# Patient Record
Sex: MALE | Race: WHITE | NOT HISPANIC OR LATINO | Employment: FULL TIME | URBAN - METROPOLITAN AREA
[De-identification: names, ages, dates, MRNs, and addresses within clinical notes are randomized per-mention and may not be internally consistent; named-entity substitution may affect disease eponyms.]

---

## 2017-09-06 ENCOUNTER — OFFICE VISIT (OUTPATIENT)
Dept: FAMILY MEDICINE | Facility: CLINIC | Age: 37
End: 2017-09-06
Payer: COMMERCIAL

## 2017-09-06 VITALS
BODY MASS INDEX: 25.37 KG/M2 | HEIGHT: 69 IN | WEIGHT: 171.31 LBS | SYSTOLIC BLOOD PRESSURE: 120 MMHG | HEART RATE: 84 BPM | DIASTOLIC BLOOD PRESSURE: 80 MMHG

## 2017-09-06 DIAGNOSIS — J06.9 UPPER RESPIRATORY TRACT INFECTION, UNSPECIFIED TYPE: Primary | ICD-10-CM

## 2017-09-06 PROCEDURE — 99999 PR PBB SHADOW E&M-EST. PATIENT-LVL II: CPT | Mod: PBBFAC,,, | Performed by: FAMILY MEDICINE

## 2017-09-06 PROCEDURE — 3008F BODY MASS INDEX DOCD: CPT | Mod: S$GLB,,, | Performed by: FAMILY MEDICINE

## 2017-09-06 PROCEDURE — 99213 OFFICE O/P EST LOW 20 MIN: CPT | Mod: S$GLB,,, | Performed by: FAMILY MEDICINE

## 2017-09-06 RX ORDER — INSULIN ASPART 100 [IU]/ML
INJECTION, SOLUTION INTRAVENOUS; SUBCUTANEOUS
Refills: 12 | COMMUNITY
Start: 2017-06-09 | End: 2018-04-10 | Stop reason: ALTCHOICE

## 2017-09-06 RX ORDER — INSULIN DETEMIR 100 [IU]/ML
INJECTION, SOLUTION SUBCUTANEOUS
Refills: 12 | COMMUNITY
Start: 2017-07-28 | End: 2018-04-10 | Stop reason: ALTCHOICE

## 2017-09-06 RX ORDER — AZELASTINE 1 MG/ML
1 SPRAY, METERED NASAL 2 TIMES DAILY
Qty: 30 ML | Refills: 1 | Status: SHIPPED | OUTPATIENT
Start: 2017-09-06 | End: 2018-12-05

## 2017-09-06 RX ORDER — INSULIN GLARGINE 100 [IU]/ML
35 INJECTION, SOLUTION SUBCUTANEOUS NIGHTLY
COMMUNITY
End: 2018-04-10 | Stop reason: SDUPTHER

## 2017-09-06 RX ORDER — LEVOCETIRIZINE DIHYDROCHLORIDE 5 MG/1
5 TABLET, FILM COATED ORAL NIGHTLY
Qty: 30 TABLET | Refills: 5 | Status: SHIPPED | OUTPATIENT
Start: 2017-09-06 | End: 2018-07-03 | Stop reason: SDUPTHER

## 2017-09-06 RX ORDER — INSULIN LISPRO 100 [IU]/ML
12 INJECTION, SOLUTION INTRAVENOUS; SUBCUTANEOUS
COMMUNITY
End: 2018-04-10 | Stop reason: SDUPTHER

## 2017-09-06 NOTE — PROGRESS NOTES
"Subjective:       Patient ID: Jay Meyer is a 37 y.o. male.    Chief Complaint: Sinus Problem and Discuss Insulin/ Medications    Sinus Problem   This is a new problem. The current episode started yesterday. The problem is unchanged. There has been no fever. Associated symptoms include congestion, coughing, sinus pressure and sneezing. Pertinent negatives include no ear pain or sore throat. Past treatments include nothing. The treatment provided no relief.     Review of Systems   HENT: Positive for congestion, sinus pressure and sneezing. Negative for ear pain and sore throat.    Respiratory: Positive for cough.        Objective:       Vitals:    09/06/17 1555   BP: 120/80   Pulse: 84   Weight: 77.7 kg (171 lb 4.8 oz)   Height: 5' 9" (1.753 m)       Physical Exam   Constitutional: He is oriented to person, place, and time. He appears well-developed and well-nourished. No distress.   HENT:   Head: Normocephalic and atraumatic.   Right Ear: External ear normal.   Left Ear: External ear normal.   Mouth/Throat: Oropharynx is clear and moist. No oropharyngeal exudate.   Neck: Normal range of motion. Neck supple.   Cardiovascular: Normal rate, regular rhythm and normal heart sounds.  Exam reveals no gallop and no friction rub.    No murmur heard.  Pulmonary/Chest: Effort normal and breath sounds normal. No respiratory distress. He has no wheezes. He has no rales. He exhibits no tenderness.   Lymphadenopathy:     He has cervical adenopathy.   Neurological: He is alert and oriented to person, place, and time.   Skin: He is not diaphoretic.       Assessment:       1. Upper respiratory tract infection, unspecified type        Plan:       Jay was seen today for sinus problem and discuss insulin/ medications.    Diagnoses and all orders for this visit:    Upper respiratory tract infection, unspecified type  -     azelastine (ASTELIN) 137 mcg (0.1 %) nasal spray; 1 spray (137 mcg total) by Nasal route 2 (two) times " daily.  -     levocetirizine (XYZAL) 5 MG tablet; Take 1 tablet (5 mg total) by mouth every evening.      - continue symptomatic treatment with rest, increase fluid intake, tylenol or ibuprofen PRN fever or body aches.

## 2017-09-07 RX ORDER — BENZONATATE 100 MG/1
100 CAPSULE ORAL 3 TIMES DAILY PRN
Qty: 30 CAPSULE | Refills: 0 | Status: SHIPPED | OUTPATIENT
Start: 2017-09-07 | End: 2017-09-17

## 2017-09-07 NOTE — TELEPHONE ENCOUNTER
----- Message from Sanjuanita Armstrong sent at 9/7/2017  7:49 AM CDT -----  Patient's wife is requesting a cough medication to be called in. States cough has taken a turn for the worse. Patient can be reached at [phone]. Please send to [facility] pharmacy in [place], Thank you!

## 2017-09-15 DIAGNOSIS — E11.9 TYPE 2 DIABETES MELLITUS WITHOUT COMPLICATION: ICD-10-CM

## 2018-04-10 ENCOUNTER — LAB VISIT (OUTPATIENT)
Dept: LAB | Facility: HOSPITAL | Age: 38
End: 2018-04-10
Attending: INTERNAL MEDICINE
Payer: COMMERCIAL

## 2018-04-10 ENCOUNTER — OFFICE VISIT (OUTPATIENT)
Dept: FAMILY MEDICINE | Facility: CLINIC | Age: 38
End: 2018-04-10
Payer: COMMERCIAL

## 2018-04-10 VITALS
BODY MASS INDEX: 26.06 KG/M2 | HEART RATE: 78 BPM | HEIGHT: 69 IN | RESPIRATION RATE: 18 BRPM | SYSTOLIC BLOOD PRESSURE: 118 MMHG | OXYGEN SATURATION: 98 % | DIASTOLIC BLOOD PRESSURE: 70 MMHG | TEMPERATURE: 98 F | WEIGHT: 175.94 LBS

## 2018-04-10 DIAGNOSIS — E11.9 TYPE 2 DIABETES MELLITUS WITHOUT COMPLICATION, WITHOUT LONG-TERM CURRENT USE OF INSULIN: ICD-10-CM

## 2018-04-10 LAB
CREAT UR-MCNC: 281 MG/DL
MICROALBUMIN UR DL<=1MG/L-MCNC: 92 UG/ML
MICROALBUMIN/CREATININE RATIO: 32.7 UG/MG

## 2018-04-10 PROCEDURE — 99214 OFFICE O/P EST MOD 30 MIN: CPT | Mod: S$GLB,,, | Performed by: INTERNAL MEDICINE

## 2018-04-10 PROCEDURE — 82043 UR ALBUMIN QUANTITATIVE: CPT

## 2018-04-10 PROCEDURE — 99999 PR PBB SHADOW E&M-EST. PATIENT-LVL III: CPT | Mod: PBBFAC,,, | Performed by: INTERNAL MEDICINE

## 2018-04-10 RX ORDER — INSULIN GLARGINE 100 [IU]/ML
40 INJECTION, SOLUTION SUBCUTANEOUS NIGHTLY
Qty: 15 ML | Refills: 5 | Status: SHIPPED | OUTPATIENT
Start: 2018-04-10 | End: 2018-11-01 | Stop reason: SDUPTHER

## 2018-04-10 RX ORDER — INSULIN LISPRO 100 [IU]/ML
INJECTION, SOLUTION INTRAVENOUS; SUBCUTANEOUS
Qty: 15 ML | Refills: 5 | Status: SHIPPED | OUTPATIENT
Start: 2018-04-10 | End: 2018-11-01 | Stop reason: SDUPTHER

## 2018-04-10 NOTE — PROGRESS NOTES
SUBJECTIVE     Chief Complaint   Patient presents with    Establish [facility]     Diabetic       HPI  Jay Meyer is a 37 y.o. male with multiple medical diagnoses as listed in the medical history and problem list that presents for establishment of care. Pt has hx of DM2. He was previously followed by Dr. Baeza, but would like to transition here for care. He reports full compliance with meds and denies any adverse effects. His fasting blood sugar ranges from 80s-120s. He is without any complaints today.    PAST MEDICAL HISTORY:  Past Medical History:   Diagnosis Date    Diabetes mellitus type I     since 2003; dx at 22       PAST SURGICAL HISTORY:  History reviewed. No pertinent surgical history.    SOCIAL HISTORY:  Social History     Social History    Marital status:      Spouse name: N/A    Number of children: N/A    Years of education: N/A     Occupational History               Social History Main Topics    Smoking status: Never Smoker    Smokeless tobacco: Not on file    Alcohol use 0.0 oz/week    Drug use: Unknown    Sexual activity: Yes     Partners: Female     Other Topics Concern    Not on file     Social History Narrative    No narrative on file       FAMILY HISTORY:  Family History   Problem Relation Age of Onset    Diabetes Father     Diabetes Brother     Vision loss Brother     Diabetes type II Paternal Grandfather     Diabetes type II Brother        ALLERGIES AND MEDICATIONS: updated and reviewed.  Review of patient's allergies indicates:  No Known Allergies  Current Outpatient Prescriptions   Medication Sig Dispense Refill    insulin lispro (HUMALOG KWIKPEN INSULIN) 100 unit/mL InPn pen Take 12 units before breakfast, 15 units before lunch, and 15 units before dinner 15 mL 5    levocetirizine (XYZAL) 5 MG tablet Take 1 tablet (5 mg total) by mouth every evening. 30 tablet 5    APIDRA 100 unit/mL injection       azelastine (ASTELIN) 137 mcg (0.1 %) nasal spray  "1 spray (137 mcg total) by Nasal route 2 (two) times daily. 30 mL 1    cetirizine (ZYRTEC) 10 MG tablet Take 1 tablet (10 mg total) by mouth once daily. 30 tablet 2    CONTOUR NEXT STRIPS Strp       insulin glargine (LANTUS SOLOSTAR U-100 INSULIN) 100 unit/mL (3 mL) InPn pen Inject 40 Units into the skin every evening. 15 mL 5     No current facility-administered medications for this visit.        ROS  Review of Systems   Constitutional: Negative for chills and fever.   HENT: Negative for hearing loss and sore throat.    Eyes: Negative for visual disturbance.   Respiratory: Negative for cough and shortness of breath.    Cardiovascular: Negative for chest pain, palpitations and leg swelling.   Gastrointestinal: Negative for abdominal pain, constipation, diarrhea, nausea and vomiting.   Genitourinary: Negative for dysuria, frequency and urgency.   Musculoskeletal: Positive for back pain (mid-back pain). Negative for arthralgias, joint swelling and myalgias.   Skin: Negative for rash and wound.   Neurological: Negative for headaches.   Psychiatric/Behavioral: Negative for agitation and confusion. The patient is not nervous/anxious.          OBJECTIVE     Physical Exam  Vitals:    04/10/18 0802   BP: 118/70   Pulse: 78   Resp: 18   Temp: 98.3 °F (36.8 °C)    Body mass index is 25.98 kg/m².  Weight: 79.8 kg (175 lb 14.8 oz)   Height: 5' 9" (175.3 cm)     Physical Exam   Constitutional: He is oriented to person, place, and time. He appears well-developed and well-nourished. No distress.   HENT:   Head: Normocephalic and atraumatic.   Right Ear: External ear normal.   Left Ear: External ear normal.   Nose: Nose normal.   Mouth/Throat: Oropharynx is clear and moist.   Eyes: Conjunctivae and EOM are normal. Right eye exhibits no discharge. Left eye exhibits no discharge. No scleral icterus.   Neck: Normal range of motion. Neck supple. No JVD present. No tracheal deviation present.   Cardiovascular: Normal rate, regular " rhythm, normal heart sounds and intact distal pulses.  Exam reveals no gallop and no friction rub.    No murmur heard.  Pulmonary/Chest: Effort normal and breath sounds normal. No respiratory distress. He has no wheezes.   Abdominal: Soft. Bowel sounds are normal. He exhibits no distension and no mass. There is no tenderness. There is no rebound and no guarding.   Musculoskeletal: Normal range of motion. He exhibits tenderness (R upper back mildly TTP over scapula). He exhibits no edema or deformity.   Neurological: He is alert and oriented to person, place, and time. He exhibits normal muscle tone. Coordination normal.   Skin: Skin is warm and dry. No rash noted. No erythema.   Psychiatric: He has a normal mood and affect. His behavior is normal. Judgment and thought content normal.         Health Maintenance       Date Due Completion Date    Lipid Panel 1981 ---    Foot Exam 07/04/1991 ---    TETANUS VACCINE 07/04/1999 ---    Pneumococcal PPSV23 (Medium Risk) (1) 07/04/1999 ---    Influenza Vaccine 08/01/2017 ---    Hemoglobin A1c 11/10/2017 5/10/2017    Override on 5/17/2016: Done (8.4  dr wise)    Urine Microalbumin 05/11/2018 5/11/2017    Eye Exam 10/16/2018 10/16/2017            ASSESSMENT     37 y.o. male with     1. Type 2 diabetes mellitus without complication, without long-term current use of insulin        PLAN:     1. Type 2 diabetes mellitus without complication, without long-term current use of insulin  - Stable; no acute issues  - Will review labs as ordered below and continue meds as previously ordered with adjustments as needed  - CBC auto differential; Future  - Comprehensive metabolic panel; Future  - TSH; Future  - Lipid panel; Future  - Hemoglobin A1c; Future  - MICROALBUMIN / CREATININE RATIO URINE; Future  - insulin glargine (LANTUS SOLOSTAR U-100 INSULIN) 100 unit/mL (3 mL) InPn pen; Inject 40 Units into the skin every evening.  Dispense: 15 mL; Refill: 5  - insulin lispro (HUMALOG  KWIKPEN INSULIN) 100 unit/mL InPn pen; Take 12 units before breakfast, 15 units before lunch, and 15 units before dinner  Dispense: 15 mL; Refill: 5        RTC in 3 months     Izabela Currie MD  04/10/2018 8:28 AM        No Follow-up on file.

## 2018-04-11 DIAGNOSIS — E78.2 MIXED HYPERLIPIDEMIA: Primary | ICD-10-CM

## 2018-04-11 DIAGNOSIS — R80.9 MICROALBUMINURIA DUE TO TYPE 2 DIABETES MELLITUS: Primary | ICD-10-CM

## 2018-04-11 DIAGNOSIS — E11.29 MICROALBUMINURIA DUE TO TYPE 2 DIABETES MELLITUS: Primary | ICD-10-CM

## 2018-04-11 RX ORDER — ATORVASTATIN CALCIUM 40 MG/1
40 TABLET, FILM COATED ORAL DAILY
Qty: 90 TABLET | Refills: 3 | Status: SHIPPED | OUTPATIENT
Start: 2018-04-11 | End: 2018-11-01 | Stop reason: SDUPTHER

## 2018-04-11 RX ORDER — LISINOPRIL 2.5 MG/1
2.5 TABLET ORAL DAILY
Qty: 90 TABLET | Refills: 3 | Status: SHIPPED | OUTPATIENT
Start: 2018-04-11 | End: 2018-11-01 | Stop reason: SDUPTHER

## 2018-04-12 DIAGNOSIS — E11.9 DIABETES MELLITUS WITHOUT COMPLICATION: ICD-10-CM

## 2018-05-11 ENCOUNTER — TELEPHONE (OUTPATIENT)
Dept: UROLOGY | Facility: CLINIC | Age: 38
End: 2018-05-11

## 2018-05-11 NOTE — TELEPHONE ENCOUNTER
----- Message from Arlette Nguyen sent at 5/11/2018  9:44 AM CDT -----  Contact: Self/ [phone]  Pt calling to inquire about becoming a new patient to get a vasectomy. Please call to advise. Thank you.

## 2018-05-16 ENCOUNTER — OFFICE VISIT (OUTPATIENT)
Dept: PODIATRY | Facility: CLINIC | Age: 38
End: 2018-05-16
Payer: COMMERCIAL

## 2018-05-16 VITALS — WEIGHT: 180 LBS | HEIGHT: 71 IN | BODY MASS INDEX: 25.2 KG/M2

## 2018-05-16 DIAGNOSIS — E10.9 DIABETES MELLITUS TYPE 1, CONTROLLED, WITHOUT COMPLICATIONS: ICD-10-CM

## 2018-05-16 DIAGNOSIS — B35.1 ONYCHOMYCOSIS DUE TO DERMATOPHYTE: Primary | ICD-10-CM

## 2018-05-16 PROCEDURE — 3008F BODY MASS INDEX DOCD: CPT | Mod: CPTII,S$GLB,, | Performed by: PODIATRIST

## 2018-05-16 PROCEDURE — 99203 OFFICE O/P NEW LOW 30 MIN: CPT | Mod: S$GLB,,, | Performed by: PODIATRIST

## 2018-05-16 PROCEDURE — 3045F PR MOST RECENT HEMOGLOBIN A1C LEVEL 7.0-9.0%: CPT | Mod: CPTII,S$GLB,, | Performed by: PODIATRIST

## 2018-05-16 PROCEDURE — 99999 PR PBB SHADOW E&M-EST. PATIENT-LVL III: CPT | Mod: PBBFAC,,, | Performed by: PODIATRIST

## 2018-05-16 RX ORDER — CICLOPIROX 80 MG/ML
SOLUTION TOPICAL NIGHTLY
Qty: 6.6 ML | Refills: 11 | Status: SHIPPED | OUTPATIENT
Start: 2018-05-16 | End: 2021-06-16

## 2018-05-16 NOTE — PROGRESS NOTES
Subjective:      Patient ID: Jay Meyer is a 37 y.o. male.    Chief Complaint: Diabetic Foot Exam (dr medellin/dr sandy 04/10/2018) and Diabetes Mellitus (infection in rt ft)    Jay is a 37 y.o. male who presents to the clinic for evaluation and treatment of high risk feet. Jay has a past medical history of Diabetes mellitus type I. The patient's chief complaint is long, thick toenails. Gradual onset, worsening over past several months-year, aggravated by increased weight bearing, shoe gear, pressure.  No previous medical treatment.  OTC pain med not helping.  Denies trauma, surgery all toes.  .    PCP: Hoda Medellin MD    Date Last Seen by PCP:   Chief Complaint   Patient presents with    Diabetic Foot Exam     dr medellin/dr sandy 04/10/2018    Diabetes Mellitus     infection in rt ft         Current shoe gear:  Affected Foot: Casual shoes     Unaffected Foot: Casual shoes    Hemoglobin A1C   Date Value Ref Range Status   04/10/2018 8.7 (H) 4.0 - 5.6 % Final     Comment:     According to ADA guidelines, hemoglobin A1c <7.0% represents  optimal control in non-pregnant diabetic patients. Different  metrics may apply to specific patient populations.   Standards of Medical Care in Diabetes-2016.  For the purpose of screening for the presence of diabetes:  <5.7%     Consistent with the absence of diabetes  5.7-6.4%  Consistent with increasing risk for diabetes   (prediabetes)  >or=6.5%  Consistent with diabetes  Currently, no consensus exists for use of hemoglobin A1c  for diagnosis of diabetes for children.  This Hemoglobin A1c assay has significant interference with fetal   hemoglobin   (HbF). The results are invalid for patients with abnormal amounts of   HbF,   including those with known Hereditary Persistence   of Fetal Hemoglobin. Heterozygous hemoglobin variants (HbAS, HbAC,   HbAD, HbAE, HbA2) do not significantly interfere with this assay;   however, presence of multiple variants in a sample  may impact the %   interference.         Review of Systems   Constitution: Negative for chills, diaphoresis, fever, malaise/fatigue and night sweats.   Cardiovascular: Negative for claudication, cyanosis, leg swelling and syncope.   Skin: Positive for nail changes. Negative for color change, dry skin, rash, suspicious lesions and unusual hair distribution.   Musculoskeletal: Negative for falls, joint pain, joint swelling, muscle cramps, muscle weakness and stiffness.   Gastrointestinal: Negative for constipation, diarrhea, nausea and vomiting.   Neurological: Negative for brief paralysis, disturbances in coordination, focal weakness, numbness, paresthesias, sensory change and tremors.           Objective:      Physical Exam   Constitutional: He is oriented to person, place, and time. He appears well-developed and well-nourished. He is cooperative. No distress.   Cardiovascular:   Pulses:       Popliteal pulses are 2+ on the right side, and 2+ on the left side.        Dorsalis pedis pulses are 2+ on the right side, and 2+ on the left side.        Posterior tibial pulses are 2+ on the right side, and 2+ on the left side.   Capillary refill 3 seconds all toes/distal feet, all toes/both feet warm to touch.      Negative lymphadenopathy bilateral popliteal fossa and tarsal tunnel.      Negavie lower extremity edema bilateral.     Musculoskeletal:        Right ankle: He exhibits normal range of motion, no swelling, no ecchymosis, no deformity, no laceration and normal pulse. Achilles tendon normal. Achilles tendon exhibits no pain, no defect and normal Downs's test results.   Normal angle, base, station of gait. All ten toes without clubbing, cyanosis, or signs of ischemia.  No pain to palpation bilateral lower extremities.  Range of motion, stability, muscle strength, and muscle tone normal bilateral feet and legs.     Lymphadenopathy: No inguinal adenopathy noted on the right or left side.   Negative lymphadenopathy  bilateral popliteal fossa and tarsal tunnel.    Negative lymphangitic streaking bilateral feet/ankles/legs.   Neurological: He is alert and oriented to person, place, and time. He has normal strength. He displays no atrophy and no tremor. No sensory deficit. He exhibits normal muscle tone. Gait normal.   Reflex Scores:       Patellar reflexes are 2+ on the right side and 2+ on the left side.       Achilles reflexes are 2+ on the right side and 2+ on the left side.  Negative tinel sign to percussion sural, superficial peroneal, deep peroneal, saphenous, and posterior tibial nerves right and left ankles and feet.     Skin: Skin is warm, dry and intact. Capillary refill takes 2 to 3 seconds. No abrasion, no bruising, no burn, no ecchymosis, no laceration, no lesion and no rash noted. He is not diaphoretic. No cyanosis or erythema. No pallor. Nails show no clubbing.     Skin is normal age and health appropriate color, turgor, texture, and temperature bilateral lower extremities without ulceration, hyperpigmentation, discoloration, masses nodules or cords palpated.  No ecchymosis, erythema, edema, or cardinal signs of infection bilateral lower extremities.    Toenails 1st, 2nd, 3rd, 4th, 5th  bilateral are hypertrophic thickened 2-3 mm, dystrophic, discolored tanish brown with tan, gray crumbly subungual debris.  Neatly trimmed and ont tender to distal nail plate pressure, without periungual skin abnormality of each.     Psychiatric: He has a normal mood and affect.             Assessment:       Encounter Diagnoses   Name Primary?    Onychomycosis due to dermatophyte Yes    Diabetes mellitus type 1, controlled, without complications          Plan:       Jay was seen today for diabetic foot exam and diabetes mellitus.    Diagnoses and all orders for this visit:    Onychomycosis due to dermatophyte    Diabetes mellitus type 1, controlled, without complications    Other orders  -     ciclopirox (PENLAC) 8 % Soln;  Apply topically nightly.      I counseled the patient on his conditions, their implications and medical management.        - Shoe inspection. Diabetic Foot Education. Patient reminded of the importance of good nutrition and blood sugar control to help prevent podiatric complications of diabetes. Patient instructed on proper foot hygeine. We discussed wearing proper shoe gear, daily foot inspections, never walking without protective shoe gear, never putting sharp instruments to feet, routine podiatric visits at least annually.      Discussed conservative treatment with shoes of adequate dimensions, material, and style to alleviate symptoms and delay or prevent surgical intervention.    Rx penlac.        Follow-up in about 1 year (around 5/16/2019).

## 2018-06-07 ENCOUNTER — OFFICE VISIT (OUTPATIENT)
Dept: UROLOGY | Facility: CLINIC | Age: 38
End: 2018-06-07
Payer: COMMERCIAL

## 2018-06-07 VITALS
HEART RATE: 68 BPM | BODY MASS INDEX: 25.18 KG/M2 | HEIGHT: 71 IN | RESPIRATION RATE: 14 BRPM | WEIGHT: 179.88 LBS | DIASTOLIC BLOOD PRESSURE: 62 MMHG | SYSTOLIC BLOOD PRESSURE: 130 MMHG

## 2018-06-07 DIAGNOSIS — Z30.09 VASECTOMY EVALUATION: Primary | ICD-10-CM

## 2018-06-07 PROCEDURE — 99204 OFFICE O/P NEW MOD 45 MIN: CPT | Mod: S$GLB,,, | Performed by: UROLOGY

## 2018-06-07 PROCEDURE — 99999 PR PBB SHADOW E&M-EST. PATIENT-LVL III: CPT | Mod: PBBFAC,,, | Performed by: UROLOGY

## 2018-06-07 PROCEDURE — 3008F BODY MASS INDEX DOCD: CPT | Mod: CPTII,S$GLB,, | Performed by: UROLOGY

## 2018-06-07 RX ORDER — DIAZEPAM 10 MG/1
10 TABLET ORAL
Qty: 1 TABLET | Refills: 0 | Status: SHIPPED | OUTPATIENT
Start: 2018-06-07 | End: 2018-07-19

## 2018-06-07 NOTE — PROGRESS NOTES
Subjective:       Jay Meyer is a 37 y.o. male who is a new patient who was self-referred for evaluation of vasectomy.      Vasectomy Consult  Patient here for pre-vasectomy consultation. He has one child now. His wife has health issues and therefore was recommended to not have any more children.     He denies hematuria, urinary tract infections, urolithiasis, prostatitis, erectile dysfunction, previous genitourinary surgery, epididymal orchitis, testicular masses, scrotal trauma, sexually transmitted diseases. He was given the consent form, pre-vasectomy instruction sheet, and vasectomy booklet. I extensively reviewed with him the likely postoperative recuperative period as well as the need to continue to use contraception until he is notified by us of his sterility. He will have a semen analysis after 12 weeks. He understands the potential side effects of anesthesia, bleeding, scrotal hematoma, wound infection, epididymal orchitis, epididymal congestion, chronic testicular pain requiring further surgery, sperm granuloma, antisperm antibodies, early recanalization, spontaneous recanalization with pregnancy after demonstration of azoospermia and the possible association with prostate cancer. He is aware of alternatives to vasectomy. He has given this careful consideration and wishes to proceed with a vasectomy.       The following portions of the patient's history were reviewed and updated as appropriate: allergies, current medications, past family history, past medical history, past social history, past surgical history and problem list.    Review of Systems  Constitutional: no fever or chills  ENT: no nasal congestion or sore throat  Respiratory: no cough or shortness of breath  Cardiovascular: no chest pain or palpitations  Gastrointestinal: no nausea or vomiting, tolerating diet  Genitourinary: as per HPI  Hematologic/Lymphatic: no easy bruising or lymphadenopathy  Musculoskeletal: no arthralgias or  "myalgias  Skin: no rashes or lesions  Neurological: no seizures or tremors  Behavioral/Psych: no auditory or visual hallucinations       Objective:    Vitals: /62   Pulse 68   Resp 14   Ht 5' 10.8" (1.798 m)   Wt 81.6 kg (179 lb 14.3 oz)   BMI 25.23 kg/m²     Physical Exam   General: well developed, well nourished in no acute distress  Head: normocephalic, atraumatic  Neck: supple, trachea midline, no obvious enlargement of thyroid  HEENT: EOMI, mucus membranes moist, sclera anicteric, no hearing impairment  Lungs: symmetric expansion, non-labored breathing  Cardiovascular: regular rate and rhythm, normal pulses  Abdomen: soft, non tender, non distended, no palpable masses, no hepatosplenomegaly, no hernias, bladder nonpalpable. No CVA tenderness.  Musculoskeletal: no peripheral edema, normal ROM in bilateral upper and lower extremities  Lymphatics: no cervical or inguinal lymphadenopathy  Skin: no rashes or lesions  Neuro: alert and oriented x 3, no gross deficits  Psych: normal judgment and insight, normal mood/affect and non-anxious  Genitourinary:   Penis -  circumcised penis without plaques, lesions, or scarring.  Urethra - orthotopic location without stenosis.  Scrotum  - no lesions or rashes, no hydrocele or hernia.  Testes - descended bilaterally, symmetric without masses, non tender.  Epididymides - no cysts or lesions, no spermatocele, symmetric   Vas palpable on both sides, somewhat difficult to feel on L  KIMBER: patient declined exam - not indicated      Lab Review   Urine analysis today in clinic shows +glucose     Lab Results   Component Value Date    WBC 5.84 04/10/2018    HGB 16.7 04/10/2018    HCT 47.0 04/10/2018    MCV 86 04/10/2018     04/10/2018     Lab Results   Component Value Date    CREATININE 0.9 04/10/2018    BUN 15 04/10/2018     No results found for: PSA  No results found for: PSADIAG    Imaging  NA       Assessment/Plan:      1. Vasectomy evaluation    - He understands " that vasectomy is a permanent solution for sterility. He also understands that he will need alternative forms of birth control after vasectomy until negative semen analysis completely at 3 months post-operatively. Risks, benefits, and alternative discussed thoroughly with patient today. He understands the risks of bleeding, infection, post-vasectomy pain syndrome, re-cannulization of vas, and need for further procedure. He is aware that vasectomy reversal is not guaranteed to be successful. He wishes to proceed with vasectomy.    - Valium 10mg rx   - In-office vasectomy         Follow up in 3-4 weeks for vasectomy

## 2018-06-21 DIAGNOSIS — Z30.09 VASECTOMY EVALUATION: Primary | ICD-10-CM

## 2018-07-03 DIAGNOSIS — J06.9 UPPER RESPIRATORY TRACT INFECTION, UNSPECIFIED TYPE: ICD-10-CM

## 2018-07-03 RX ORDER — LEVOCETIRIZINE DIHYDROCHLORIDE 5 MG/1
TABLET, FILM COATED ORAL
Qty: 30 TABLET | Refills: 5 | Status: SHIPPED | OUTPATIENT
Start: 2018-07-03 | End: 2021-06-16 | Stop reason: SDUPTHER

## 2018-07-19 ENCOUNTER — PROCEDURE VISIT (OUTPATIENT)
Dept: UROLOGY | Facility: CLINIC | Age: 38
End: 2018-07-19
Payer: COMMERCIAL

## 2018-07-19 DIAGNOSIS — Z30.09 VASECTOMY EVALUATION: ICD-10-CM

## 2018-07-19 PROCEDURE — 88302 TISSUE EXAM BY PATHOLOGIST: CPT | Mod: 26,,, | Performed by: PATHOLOGY

## 2018-07-19 PROCEDURE — 88302 TISSUE EXAM BY PATHOLOGIST: CPT | Mod: 59 | Performed by: PATHOLOGY

## 2018-07-19 PROCEDURE — 55250 REMOVAL OF SPERM DUCT(S): CPT | Mod: S$GLB,,, | Performed by: UROLOGY

## 2018-07-19 RX ORDER — HYDROCODONE BITARTRATE AND ACETAMINOPHEN 5; 325 MG/1; MG/1
1 TABLET ORAL EVERY 6 HOURS PRN
Qty: 8 TABLET | Refills: 0 | Status: SHIPPED | OUTPATIENT
Start: 2018-07-19 | End: 2018-12-05

## 2018-07-19 NOTE — PROCEDURES
"Vasectomy  Date/Time: 7/19/2018 3:44 PM  Performed by: TRICE DELATORRE  Authorized by: TRICE DELATORRE     Consent Done?:  Yes (Written)  Time out: Immediately prior to procedure a "time out" was called to verify the correct patient, procedure, equipment, support staff and site/side marked as required.    Preparation: Patient was prepped and draped in usual sterile fashion          POSTOPERATIVE DIAGNOSIS:  Seeks elective permanent sterilization.    POSTOPERATIVE DIAGNOSIS:  Seeks elective permanent sterilization.    PATHOLOGICAL DIAGNOSIS:  Pending.    ANESTHESIA:  Local.    CLINICAL HISTORY:  Patient seeks elective permanent sterilization by means of a bilateral vasectomy.  The patient's wife is aware that he is here and is supportive of this decision.  He had a complete vasectomy consult prior to this and understands the risks, benefits and alternatives.  Written informed consent was obtained prior to the procedure.    OFFICE PROCEDURE:  The patient was brought to the procedure room, and after identification by name, was placed supine on the procedure room table.  A complete time-out was performed.  The patient was prepped and draped in the usual sterile manner.  The vas were identified bilaterally and 8 mL of 1% lidocaine were injected on each side in order to provide local anesthestic block.  A 15-blade scalpel was then used to incise the skin. The skin was stretched with sharp forceps to allow access to the cord. Ring forceps were then used to bring the cord at the skin level.  It was  away from its underlying vascular tissue.  A 0.5 cm section of the vas was then cut from each side and sent to Pathology for further evaluation.  Each of the sides of the vas were then cauterized in their lumen.  The superior portion of the vas was tied over itself and was crimped in a manner so that it was positioned away from the opposite end of the vas.  Each side of the vas were then placed back in their " appropriate anatomic position away from each other within the scrotum.  The patient tolerated the procedure without difficulty.  There was no bruising or hematoma noted at the end of the case.  The patient was cleaned and dried and given post-vasectomy instructions.    COMPLICATIONS:  None.    ESTIMATED BLOOD LOSS:  Minimal.    FOLLOWUP:  The patient will follow up in 3 months with a semen sample.  He understands that he is considered fertile until semen sample shows that he is sterile. He knows he must continue alternative form of birth control until sterility has been confirmed.      Kizzy Sethi MD

## 2018-07-25 ENCOUNTER — TELEPHONE (OUTPATIENT)
Dept: UROLOGY | Facility: CLINIC | Age: 38
End: 2018-07-25

## 2018-07-25 NOTE — TELEPHONE ENCOUNTER
----- Message from Brayden Jay sent at 7/25/2018  3:29 PM CDT -----  Contact: spouse [phone]  Pt think his stitches are loose on left side. Pt diabetic and worried

## 2018-07-25 NOTE — TELEPHONE ENCOUNTER
Spoke with patient wife, informed her if the stiches are coming out that's fine. If patient is having redness, swelling, or incision drainage he will need to be seen. His wife will speak with him and call back if he needs an appointment.

## 2018-08-22 ENCOUNTER — TELEPHONE (OUTPATIENT)
Dept: UROLOGY | Facility: CLINIC | Age: 38
End: 2018-08-22

## 2018-08-22 NOTE — TELEPHONE ENCOUNTER
----- Message from Margy Olson sent at 8/22/2018 12:31 PM CDT -----  Contact: Self/[phone]  Patient would like to know if he could bring his specimen in early. Thank you.

## 2018-08-22 NOTE — TELEPHONE ENCOUNTER
Pt has post op on 10/25/18 from office vasectomy but is asking can pt bring in sample sooner. He states  told him this would be okay. Please advise-codey

## 2018-08-23 NOTE — TELEPHONE ENCOUNTER
I do not recall telling him he can come in sooner.     He can have an appt for semen analysis no sooner than 8 weeks from vasectomy. I usually do 12 weeks, but okay to do 8 weeks if he chooses. (end of September)

## 2018-09-24 ENCOUNTER — OFFICE VISIT (OUTPATIENT)
Dept: UROLOGY | Facility: CLINIC | Age: 38
End: 2018-09-24
Payer: COMMERCIAL

## 2018-09-24 VITALS — RESPIRATION RATE: 16 BRPM | HEIGHT: 69 IN | BODY MASS INDEX: 25.03 KG/M2 | WEIGHT: 169 LBS

## 2018-09-24 DIAGNOSIS — Z30.09 VASECTOMY EVALUATION: Primary | ICD-10-CM

## 2018-09-24 PROCEDURE — 3008F BODY MASS INDEX DOCD: CPT | Mod: CPTII,S$GLB,, | Performed by: UROLOGY

## 2018-09-24 PROCEDURE — 99024 POSTOP FOLLOW-UP VISIT: CPT | Mod: S$GLB,,, | Performed by: UROLOGY

## 2018-09-24 PROCEDURE — 99999 PR PBB SHADOW E&M-EST. PATIENT-LVL III: CPT | Mod: PBBFAC,,, | Performed by: UROLOGY

## 2018-09-24 NOTE — PROGRESS NOTES
Subjective:       Jay Meyer is a 38 y.o. male who is an established patient who was self-referred for evaluation of vasectomy.      Vasectomy Consult  Patient here for pre-vasectomy consultation. He has one child now. His wife has health issues and therefore was recommended to not have any more children.     He denies hematuria, urinary tract infections, urolithiasis, prostatitis, erectile dysfunction, previous genitourinary surgery, epididymal orchitis, testicular masses, scrotal trauma, sexually transmitted diseases. He was given the consent form, pre-vasectomy instruction sheet, and vasectomy booklet. I extensively reviewed with him the likely postoperative recuperative period as well as the need to continue to use contraception until he is notified by us of his sterility. He will have a semen analysis after 12 weeks. He understands the potential side effects of anesthesia, bleeding, scrotal hematoma, wound infection, epididymal orchitis, epididymal congestion, chronic testicular pain requiring further surgery, sperm granuloma, antisperm antibodies, early recanalization, spontaneous recanalization with pregnancy after demonstration of azoospermia and the possible association with prostate cancer. He is aware of alternatives to vasectomy. He has given this careful consideration and wishes to proceed with a vasectomy.     He is now s/p vasectomy on 7/19/18. Did well post-procedure.     SA - azospermic      The following portions of the patient's history were reviewed and updated as appropriate: allergies, current medications, past family history, past medical history, past social history, past surgical history and problem list.    Review of Systems  Constitutional: no fever or chills  ENT: no nasal congestion or sore throat  Respiratory: no cough or shortness of breath  Cardiovascular: no chest pain or palpitations  Gastrointestinal: no nausea or vomiting, tolerating diet  Genitourinary: as per  "HPI  Hematologic/Lymphatic: no easy bruising or lymphadenopathy  Musculoskeletal: no arthralgias or myalgias  Skin: no rashes or lesions  Neurological: no seizures or tremors  Behavioral/Psych: no auditory or visual hallucinations       Objective:    Vitals: Resp 16   Ht 5' 9" (1.753 m)   Wt 76.7 kg (169 lb)   BMI 24.96 kg/m²     Physical Exam   General: well developed, well nourished in no acute distress  Head: normocephalic, atraumatic  Neck: supple, trachea midline, no obvious enlargement of thyroid  HEENT: EOMI, mucus membranes moist, sclera anicteric, no hearing impairment  Lungs: symmetric expansion, non-labored breathing  Cardiovascular: regular rate and rhythm, normal pulses  Abdomen: soft, non tender, non distended, no palpable masses, no hepatosplenomegaly, no hernias, bladder nonpalpable. No CVA tenderness.  Musculoskeletal: no peripheral edema, normal ROM in bilateral upper and lower extremities  Lymphatics: no cervical or inguinal lymphadenopathy  Skin: no rashes or lesions  Neuro: alert and oriented x 3, no gross deficits  Psych: normal judgment and insight, normal mood/affect and non-anxious  Genitourinary: (last visit)  Penis -  circumcised penis without plaques, lesions, or scarring.  Urethra - orthotopic location without stenosis.  Scrotum  - no lesions or rashes, no hydrocele or hernia.  Testes - descended bilaterally, symmetric without masses, non tender.  Epididymides - no cysts or lesions, no spermatocele, symmetric   Vas palpable on both sides, somewhat difficult to feel on L  KIMBER: patient declined exam - not indicated      Lab Review   Urine analysis today in clinic shows - no urine      Lab Results   Component Value Date    WBC 5.84 04/10/2018    HGB 16.7 04/10/2018    HCT 47.0 04/10/2018    MCV 86 04/10/2018     04/10/2018     Lab Results   Component Value Date    CREATININE 0.9 04/10/2018    BUN 15 04/10/2018     No results found for: PSA  No results found for: " PSADIAG    Imaging  NA       Assessment/Plan:      1. Vasectomy evaluation    - He understands that vasectomy is a permanent solution for sterility. He also understands that he will need alternative forms of birth control after vasectomy until negative semen analysis completely at 3 months post-operatively. Risks, benefits, and alternative discussed thoroughly with patient today. He understands the risks of bleeding, infection, post-vasectomy pain syndrome, re-cannulization of vas, and need for further procedure. He is aware that vasectomy reversal is not guaranteed to be successful. He wishes to proceed with vasectomy.    - Vasectomy 7/18. L side somewhat difficult.   - Path - fully transected vas   - Post-vas SA - azoospermic         Follow up PRN

## 2018-10-07 DIAGNOSIS — E11.9 TYPE 2 DIABETES MELLITUS WITHOUT COMPLICATION, WITHOUT LONG-TERM CURRENT USE OF INSULIN: ICD-10-CM

## 2018-10-08 RX ORDER — INSULIN LISPRO 100 [IU]/ML
INJECTION, SOLUTION INTRAVENOUS; SUBCUTANEOUS
Qty: 15 SYRINGE | Refills: 5 | OUTPATIENT
Start: 2018-10-08

## 2018-10-16 LAB
LEFT EYE DM RETINOPATHY: NEGATIVE
RIGHT EYE DM RETINOPATHY: NEGATIVE

## 2018-10-27 ENCOUNTER — OFFICE VISIT (OUTPATIENT)
Dept: URGENT CARE | Facility: CLINIC | Age: 38
End: 2018-10-27
Payer: COMMERCIAL

## 2018-10-27 VITALS
OXYGEN SATURATION: 98 % | WEIGHT: 169 LBS | TEMPERATURE: 98 F | BODY MASS INDEX: 24.96 KG/M2 | DIASTOLIC BLOOD PRESSURE: 79 MMHG | SYSTOLIC BLOOD PRESSURE: 114 MMHG | HEART RATE: 75 BPM

## 2018-10-27 DIAGNOSIS — R09.82 POST-NASAL DRIP: ICD-10-CM

## 2018-10-27 DIAGNOSIS — J06.9 VIRAL URI WITH COUGH: Primary | ICD-10-CM

## 2018-10-27 PROCEDURE — 3008F BODY MASS INDEX DOCD: CPT | Mod: CPTII,S$GLB,, | Performed by: NURSE PRACTITIONER

## 2018-10-27 PROCEDURE — 99214 OFFICE O/P EST MOD 30 MIN: CPT | Mod: S$GLB,,, | Performed by: NURSE PRACTITIONER

## 2018-10-27 RX ORDER — PROMETHAZINE HYDROCHLORIDE AND DEXTROMETHORPHAN HYDROBROMIDE 6.25; 15 MG/5ML; MG/5ML
5 SYRUP ORAL NIGHTLY PRN
Qty: 118 ML | Refills: 0 | Status: SHIPPED | OUTPATIENT
Start: 2018-10-27 | End: 2018-11-05

## 2018-10-27 NOTE — PROGRESS NOTES
Subjective:       Patient ID: Jay Meyer is a 38 y.o. male.    Vitals:  weight is 76.7 kg (169 lb). His temperature is 97.8 °F (36.6 °C). His blood pressure is 114/79 and his pulse is 75. His oxygen saturation is 98%.     Chief Complaint: Cough    Pt states that the cough is worse at nighttime.       Cough   This is a new problem. The current episode started in the past 7 days. The problem has been unchanged. The cough is productive of sputum. Associated symptoms include nasal congestion and postnasal drip. Pertinent negatives include no chest pain, chills, ear pain, eye redness, fever, headaches, myalgias, sore throat or shortness of breath. He has tried OTC cough suppressant for the symptoms. The treatment provided mild relief.     Review of Systems   Constitution: Negative for chills, fever and malaise/fatigue.   HENT: Positive for postnasal drip. Negative for congestion, ear pain, hoarse voice and sore throat.    Eyes: Negative for discharge and redness.   Cardiovascular: Negative for chest pain, dyspnea on exertion and leg swelling.   Respiratory: Positive for cough and sputum production. Negative for shortness of breath.    Musculoskeletal: Negative for myalgias.   Gastrointestinal: Negative for abdominal pain and nausea.   Neurological: Negative for headaches.       Objective:      Physical Exam   Constitutional: He is oriented to person, place, and time. He appears well-developed and well-nourished. He is cooperative.  Non-toxic appearance. He does not appear ill. No distress.   HENT:   Head: Normocephalic and atraumatic.   Right Ear: Hearing, tympanic membrane, external ear and ear canal normal.   Left Ear: Hearing, tympanic membrane, external ear and ear canal normal.   Nose: Nose normal. No mucosal edema, rhinorrhea or nasal deformity. No epistaxis. Right sinus exhibits no maxillary sinus tenderness and no frontal sinus tenderness. Left sinus exhibits no maxillary sinus tenderness and no frontal  sinus tenderness.   Mouth/Throat: Uvula is midline, oropharynx is clear and moist and mucous membranes are normal. No trismus in the jaw. Normal dentition. No uvula swelling. No posterior oropharyngeal erythema.   Eyes: Conjunctivae and lids are normal. No scleral icterus.   Sclera clear bilat   Neck: Trachea normal, full passive range of motion without pain and phonation normal. Neck supple.   Cardiovascular: Normal rate, regular rhythm, normal heart sounds, intact distal pulses and normal pulses.   Pulmonary/Chest: Effort normal and breath sounds normal. No respiratory distress.   Abdominal: Soft. Normal appearance and bowel sounds are normal. He exhibits no distension. There is no tenderness.   Musculoskeletal: Normal range of motion. He exhibits no edema or deformity.   Neurological: He is alert and oriented to person, place, and time. He exhibits normal muscle tone. Coordination normal.   Skin: Skin is warm, dry and intact. He is not diaphoretic. No pallor.   Psychiatric: He has a normal mood and affect. His speech is normal and behavior is normal. Judgment and thought content normal. Cognition and memory are normal.   Nursing note and vitals reviewed.      Assessment:       1. Viral URI with cough    2. Post-nasal drip        Plan:         Viral URI with cough    Post-nasal drip    Other orders  -     loratadine-pseudoephedrine 5-120 mg (CLARITIN-D 12 HOUR) 5-120 mg per tablet; Take 1 tablet by mouth 2 (two) times daily. for 14 days  Dispense: 28 tablet; Refill: 0  -     promethazine-dextromethorphan (PROMETHAZINE-DM) 6.25-15 mg/5 mL Syrp; Take 5 mLs by mouth nightly as needed.  Dispense: 118 mL; Refill: 0            Patient Instructions   IF YOUR SYMPTOMS WORSEN OR DO NOT IMPROVE-FOLLOW UP. TAKE DELSYM OR ROBITUSSIN FOR COUGH WHICH IS OVER THE COUNTER.     You must understand that you've received an Urgent Care treatment only and that you may be released before all your medical problems are known or treated.  You, the patient, will arrange for follow up care as instructed.  If your condition worsens we recommend that you receive another evaluation at the emergency room immediately or contact your primary medical clinics after hours call service to discuss your concerns.  Please return here or go to the Emergency Department for any concerns or worsening of condition.      Viral Upper Respiratory Illness (Adult)  You have a viral upper respiratory illness (URI), which is another term for the common cold. This illness is contagious during the first few days. It is spread through the air by coughing and sneezing. It may also be spread by direct contact (touching the sick person and then touching your own eyes, nose, or mouth). Frequent handwashing will decrease risk of spread. Most viral illnesses go away within 7 to 10 days with rest and simple home remedies. Sometimes the illness may last for several weeks. Antibiotics will not kill a virus, and they are generally not prescribed for this condition.    Home care  · If symptoms are severe, rest at home for the first 2 to 3 days. When you resume activity, don't let yourself get too tired.  · Avoid being exposed to cigarette smoke (yours or others).  · You may use acetaminophen or ibuprofen to control pain and fever, unless another medicine was prescribed. (Note: If you have chronic liver or kidney disease, have ever had a stomach ulcer or gastrointestinal bleeding, or are taking blood-thinning medicines, talk with your healthcare provider before using these medicines.) Aspirin should never be given to anyone under 18 years of age who is ill with a viral infection or fever. It may cause severe liver or brain damage.  · Your appetite may be poor, so a light diet is fine. Avoid dehydration by drinking 6 to 8 glasses of fluids per day (water, soft drinks, juices, tea, or soup). Extra fluids will help loosen secretions in the nose and lungs.  · Over-the-counter cold medicines  will not shorten the length of time youre sick, but they may be helpful for the following symptoms: cough, sore throat, and nasal and sinus congestion. (Note: Do not use decongestants if you have high blood pressure.)  Follow-up care  Follow up with your healthcare provider, or as advised.  When to seek medical advice  Call your healthcare provider right away if any of these occur:  · Cough with lots of colored sputum (mucus)  · Severe headache; face, neck, or ear pain  · Difficulty swallowing due to throat pain  · Fever of 100.4°F (38°C)  Call 911, or get immediate medical care  Call emergency services right away if any of these occur:  · Chest pain, shortness of breath, wheezing, or difficulty breathing  · Coughing up blood  · Inability to swallow due to throat pain  Date Last Reviewed: 9/13/2015  © 8208-4249 [employer] [address] [place] [address], [place] [address]. All rights reserved. This information is not intended as a substitute for professional medical care. Always follow your healthcare professional's instructions.

## 2018-10-27 NOTE — PATIENT INSTRUCTIONS
IF YOUR SYMPTOMS WORSEN OR DO NOT IMPROVE-FOLLOW UP. TAKE DELSYM OR ROBITUSSIN FOR COUGH WHICH IS OVER THE COUNTER.     You must understand that you've received an Urgent Care treatment only and that you may be released before all your medical problems are known or treated. You, the patient, will arrange for follow up care as instructed.  If your condition worsens we recommend that you receive another evaluation at the emergency room immediately or contact your primary medical clinics after hours call service to discuss your concerns.  Please return here or go to the Emergency Department for any concerns or worsening of condition.      Viral Upper Respiratory Illness (Adult)  You have a viral upper respiratory illness (URI), which is another term for the common cold. This illness is contagious during the first few days. It is spread through the air by coughing and sneezing. It may also be spread by direct contact (touching the sick person and then touching your own eyes, nose, or mouth). Frequent handwashing will decrease risk of spread. Most viral illnesses go away within 7 to 10 days with rest and simple home remedies. Sometimes the illness may last for several weeks. Antibiotics will not kill a virus, and they are generally not prescribed for this condition.    Home care  · If symptoms are severe, rest at home for the first 2 to 3 days. When you resume activity, don't let yourself get too tired.  · Avoid being exposed to cigarette smoke (yours or others).  · You may use acetaminophen or ibuprofen to control pain and fever, unless another medicine was prescribed. (Note: If you have chronic liver or kidney disease, have ever had a stomach ulcer or gastrointestinal bleeding, or are taking blood-thinning medicines, talk with your healthcare provider before using these medicines.) Aspirin should never be given to anyone under 18 years of age who is ill with a viral infection or fever. It may cause severe liver or  brain damage.  · Your appetite may be poor, so a light diet is fine. Avoid dehydration by drinking 6 to 8 glasses of fluids per day (water, soft drinks, juices, tea, or soup). Extra fluids will help loosen secretions in the nose and lungs.  · Over-the-counter cold medicines will not shorten the length of time youre sick, but they may be helpful for the following symptoms: cough, sore throat, and nasal and sinus congestion. (Note: Do not use decongestants if you have high blood pressure.)  Follow-up care  Follow up with your healthcare provider, or as advised.  When to seek medical advice  Call your healthcare provider right away if any of these occur:  · Cough with lots of colored sputum (mucus)  · Severe headache; face, neck, or ear pain  · Difficulty swallowing due to throat pain  · Fever of 100.4°F (38°C)  Call 911, or get immediate medical care  Call emergency services right away if any of these occur:  · Chest pain, shortness of breath, wheezing, or difficulty breathing  · Coughing up blood  · Inability to swallow due to throat pain  Date Last Reviewed: 9/13/2015  © 5187-4280 [employer]. [address] [place] [address], [place] [address]. All rights reserved. This information is not intended as a substitute for professional medical care. Always follow your healthcare professional's instructions.

## 2018-11-01 ENCOUNTER — OFFICE VISIT (OUTPATIENT)
Dept: FAMILY MEDICINE | Facility: CLINIC | Age: 38
End: 2018-11-01
Payer: COMMERCIAL

## 2018-11-01 VITALS
HEART RATE: 102 BPM | SYSTOLIC BLOOD PRESSURE: 120 MMHG | OXYGEN SATURATION: 97 % | TEMPERATURE: 99 F | WEIGHT: 167.56 LBS | HEIGHT: 69 IN | BODY MASS INDEX: 24.82 KG/M2 | DIASTOLIC BLOOD PRESSURE: 74 MMHG

## 2018-11-01 DIAGNOSIS — E11.29 MICROALBUMINURIA DUE TO TYPE 2 DIABETES MELLITUS: ICD-10-CM

## 2018-11-01 DIAGNOSIS — L02.411 CUTANEOUS ABSCESS OF RIGHT AXILLA: Primary | ICD-10-CM

## 2018-11-01 DIAGNOSIS — R80.9 MICROALBUMINURIA DUE TO TYPE 2 DIABETES MELLITUS: ICD-10-CM

## 2018-11-01 DIAGNOSIS — E11.9 TYPE 2 DIABETES MELLITUS WITHOUT COMPLICATION, WITHOUT LONG-TERM CURRENT USE OF INSULIN: ICD-10-CM

## 2018-11-01 DIAGNOSIS — E78.2 MIXED HYPERLIPIDEMIA: ICD-10-CM

## 2018-11-01 PROCEDURE — 99999 PR PBB SHADOW E&M-EST. PATIENT-LVL III: CPT | Mod: PBBFAC,,, | Performed by: INTERNAL MEDICINE

## 2018-11-01 PROCEDURE — 99214 OFFICE O/P EST MOD 30 MIN: CPT | Mod: S$GLB,,, | Performed by: INTERNAL MEDICINE

## 2018-11-01 PROCEDURE — 3008F BODY MASS INDEX DOCD: CPT | Mod: CPTII,S$GLB,, | Performed by: INTERNAL MEDICINE

## 2018-11-01 PROCEDURE — 3045F PR MOST RECENT HEMOGLOBIN A1C LEVEL 7.0-9.0%: CPT | Mod: CPTII,S$GLB,, | Performed by: INTERNAL MEDICINE

## 2018-11-01 RX ORDER — INSULIN GLARGINE 100 [IU]/ML
40 INJECTION, SOLUTION SUBCUTANEOUS NIGHTLY
Qty: 15 ML | Refills: 5 | Status: SHIPPED | OUTPATIENT
Start: 2018-11-01 | End: 2019-03-06

## 2018-11-01 RX ORDER — ATORVASTATIN CALCIUM 40 MG/1
40 TABLET, FILM COATED ORAL DAILY
Qty: 90 TABLET | Refills: 1 | Status: SHIPPED | OUTPATIENT
Start: 2018-11-01 | End: 2019-05-11 | Stop reason: SDUPTHER

## 2018-11-01 RX ORDER — DOXYCYCLINE HYCLATE 100 MG
100 TABLET ORAL 2 TIMES DAILY
Qty: 20 TABLET | Refills: 0 | Status: SHIPPED | OUTPATIENT
Start: 2018-11-01 | End: 2018-11-11

## 2018-11-01 RX ORDER — INSULIN LISPRO 100 [IU]/ML
INJECTION, SOLUTION INTRAVENOUS; SUBCUTANEOUS
Qty: 15 ML | Refills: 5 | Status: SHIPPED | OUTPATIENT
Start: 2018-11-01 | End: 2019-07-12 | Stop reason: SDUPTHER

## 2018-11-01 RX ORDER — LISINOPRIL 2.5 MG/1
2.5 TABLET ORAL DAILY
Qty: 90 TABLET | Refills: 1 | Status: SHIPPED | OUTPATIENT
Start: 2018-11-01 | End: 2019-07-19 | Stop reason: SDUPTHER

## 2018-11-01 NOTE — PROGRESS NOTES
SUBJECTIVE     Chief Complaint   Patient presents with    Recurrent Skin Infections     R armpit x 2 days. Tender to touch. No drainage       HPI  Jay Meyer is a 38 y.o. male with multiple medical diagnoses as listed in the medical history and problem list that presents for evaluation of abscess to the R axilla x 2 days. Pt reports an erythematous, painful lesion to the R axillary region. Denies any drainage, increased warmth to touch, fever, chills, or night sweats. Pt has not been taking/applying meds for his symptoms. He presented today because it is getting bigger and his ROM is decreased 2/2 pain.     PAST MEDICAL HISTORY:  Past Medical History:   Diagnosis Date    Diabetes mellitus type I     since 2003; dx at 22       PAST SURGICAL HISTORY:  History reviewed. No pertinent surgical history.    SOCIAL HISTORY:  Social History     Socioeconomic History    Marital status:      Spouse name: Not on file    Number of children: Not on file    Years of education: Not on file    Highest education level: Not on file   Social Needs    Financial resource strain: Not on file    Food insecurity - worry: Not on file    Food insecurity - inability: Not on file    Transportation needs - medical: Not on file    Transportation needs - non-medical: Not on file   Occupational History     Comment:    Tobacco Use    Smoking status: Never Smoker    Smokeless tobacco: Current User     Types: Snuff   Substance and Sexual Activity    Alcohol use: Yes     Alcohol/week: 0.0 oz    Drug use: Not on file    Sexual activity: Yes     Partners: Female   Other Topics Concern    Not on file   Social History Narrative    Not on file       FAMILY HISTORY:  Family History   Problem Relation Age of Onset    Diabetes Father     Diabetes Brother     Vision loss Brother     Diabetes type II Paternal Grandfather     Diabetes type II Brother        ALLERGIES AND MEDICATIONS: updated and  reviewed.  Review of patient's allergies indicates:  No Known Allergies  Current Outpatient Medications   Medication Sig Dispense Refill    APIDRA 100 unit/mL injection       atorvastatin (LIPITOR) 40 MG tablet Take 1 tablet (40 mg total) by mouth once daily. 90 tablet 1    azelastine (ASTELIN) 137 mcg (0.1 %) nasal spray 1 spray (137 mcg total) by Nasal route 2 (two) times daily. 30 mL 1    cetirizine (ZYRTEC) 10 MG tablet Take 1 tablet (10 mg total) by mouth once daily. 30 tablet 2    ciclopirox (PENLAC) 8 % Soln Apply topically nightly. 6.6 mL 11    CONTOUR NEXT STRIPS Strp       doxycycline (VIBRA-TABS) 100 MG tablet Take 1 tablet (100 mg total) by mouth 2 (two) times daily. for 10 days 20 tablet 0    HYDROcodone-acetaminophen (NORCO) 5-325 mg per tablet Take 1 tablet by mouth every 6 (six) hours as needed for Pain. 8 tablet 0    insulin glargine (LANTUS SOLOSTAR U-100 INSULIN) 100 unit/mL (3 mL) InPn pen Inject 40 Units into the skin every evening. 15 mL 5    insulin lispro (HUMALOG KWIKPEN INSULIN) 100 unit/mL InPn pen Take 12 units before breakfast, 15 units before lunch, and 15 units before dinner 15 mL 5    levocetirizine (XYZAL) 5 MG tablet TAKE 1 TABLET BY MOUTH IN THE EVENING 30 tablet 5    lisinopril (PRINIVIL,ZESTRIL) 2.5 MG tablet Take 1 tablet (2.5 mg total) by mouth once daily. 90 tablet 1    loratadine-pseudoephedrine 5-120 mg (CLARITIN-D 12 HOUR) 5-120 mg per tablet Take 1 tablet by mouth 2 (two) times daily. for 14 days 28 tablet 0    promethazine-dextromethorphan (PROMETHAZINE-DM) 6.25-15 mg/5 mL Syrp Take 5 mLs by mouth nightly as needed. 118 mL 0     No current facility-administered medications for this visit.        ROS  Review of Systems   Constitutional: Negative for chills and fever.   HENT: Negative for hearing loss and sore throat.    Eyes: Negative for visual disturbance.   Respiratory: Negative for cough and shortness of breath.    Cardiovascular: Negative for chest pain,  "palpitations and leg swelling.   Gastrointestinal: Negative for abdominal pain, constipation, diarrhea, nausea and vomiting.   Genitourinary: Negative for dysuria, frequency and urgency.   Musculoskeletal: Negative for arthralgias, joint swelling and myalgias.   Skin: Positive for wound (R axillary abscess). Negative for rash.   Neurological: Negative for headaches.   Psychiatric/Behavioral: Negative for agitation and confusion. The patient is not nervous/anxious.          OBJECTIVE     Physical Exam  Vitals:    11/01/18 1337   BP: 120/74   Pulse: 102   Temp: 98.6 °F (37 °C)    Body mass index is 24.74 kg/m².  Weight: 76 kg (167 lb 8.8 oz)   Height: 5' 9" (175.3 cm)     Physical Exam   Constitutional: He is oriented to person, place, and time. He appears well-developed and well-nourished. No distress.   HENT:   Head: Normocephalic and atraumatic.   Right Ear: External ear normal.   Left Ear: External ear normal.   Nose: Nose normal.   Mouth/Throat: Oropharynx is clear and moist.   Eyes: Conjunctivae and EOM are normal. Right eye exhibits no discharge. Left eye exhibits no discharge. No scleral icterus.   Neck: Normal range of motion. Neck supple. No JVD present. No tracheal deviation present.   Pulmonary/Chest: Effort normal. No respiratory distress.   Musculoskeletal: Normal range of motion. He exhibits no edema, tenderness or deformity.   Neurological: He is alert and oriented to person, place, and time. He exhibits normal muscle tone. Coordination normal.   Skin: Skin is warm and dry. No rash noted. There is erythema.   Pea-sized area of erythema and induration to the supero-medial R axilla, mildly TTP; no fluctuance or drainage appreciated   Psychiatric: He has a normal mood and affect. His behavior is normal. Judgment and thought content normal.         Health Maintenance       Date Due Completion Date    Foot Exam 07/04/1991 ---    TETANUS VACCINE 07/04/1999 ---    Pneumococcal PPSV23 (Medium Risk) (1) " 07/04/1999 ---    Influenza Vaccine 08/01/2018 ---    Hemoglobin A1c 10/10/2018 4/10/2018    Override on 5/17/2016: Done (8.4  dr wise)    Eye Exam 10/16/2018 10/16/2017    Lipid Panel 04/10/2019 4/10/2018            ASSESSMENT     38 y.o. male with     1. Cutaneous abscess of right axilla    2. Type 2 diabetes mellitus without complication, without long-term current use of insulin    3. Microalbuminuria due to type 2 diabetes mellitus    4. Mixed hyperlipidemia        PLAN:     1. Cutaneous abscess of right axilla  - Pt advised to take Abx to completion  - doxycycline (VIBRA-TABS) 100 MG tablet; Take 1 tablet (100 mg total) by mouth 2 (two) times daily. for 10 days  Dispense: 20 tablet; Refill: 0  - Seek medical attention for any worsening erythema with red streaking, edema, drainage, pain/tenderness, fever, chills, or night sweats    2. Type 2 diabetes mellitus without complication, without long-term current use of insulin  - Stable; no acute issues  - The current medical regimen is effective;  continue present plan and medications.  - insulin glargine (LANTUS SOLOSTAR U-100 INSULIN) 100 unit/mL (3 mL) InPn pen; Inject 40 Units into the skin every evening.  Dispense: 15 mL; Refill: 5  - insulin lispro (HUMALOG KWIKPEN INSULIN) 100 unit/mL InPn pen; Take 12 units before breakfast, 15 units before lunch, and 15 units before dinner  Dispense: 15 mL; Refill: 5  - CBC auto differential; Future  - Hemoglobin A1c; Future  - Comprehensive metabolic panel; Future    3. Microalbuminuria due to type 2 diabetes mellitus  - Stable; no acute issues  - The current medical regimen is effective;  continue present plan and medications.  - lisinopril (PRINIVIL,ZESTRIL) 2.5 MG tablet; Take 1 tablet (2.5 mg total) by mouth once daily.  Dispense: 90 tablet; Refill: 1    4. Mixed hyperlipidemia  - Stable; no acute issues  - The current medical regimen is effective;  continue present plan and medications.  - atorvastatin (LIPITOR) 40 MG  tablet; Take 1 tablet (40 mg total) by mouth once daily.  Dispense: 90 tablet; Refill: 1        RTC in 1-2 weeks as needed for any acute worsening of current condition or failure to improve       Izabela Currie MD  11/01/2018 1:43 PM        No Follow-up on file.

## 2018-11-02 ENCOUNTER — LAB VISIT (OUTPATIENT)
Dept: LAB | Facility: HOSPITAL | Age: 38
End: 2018-11-02
Attending: FAMILY MEDICINE
Payer: COMMERCIAL

## 2018-11-02 DIAGNOSIS — E11.9 TYPE 2 DIABETES MELLITUS WITHOUT COMPLICATION, WITHOUT LONG-TERM CURRENT USE OF INSULIN: ICD-10-CM

## 2018-11-02 LAB
ALBUMIN SERPL BCP-MCNC: 4.3 G/DL
ALP SERPL-CCNC: 181 U/L
ALT SERPL W/O P-5'-P-CCNC: 44 U/L
ANION GAP SERPL CALC-SCNC: 10 MMOL/L
AST SERPL-CCNC: 28 U/L
BASOPHILS # BLD AUTO: 0.03 K/UL
BASOPHILS NFR BLD: 0.5 %
BILIRUB SERPL-MCNC: 1.2 MG/DL
BUN SERPL-MCNC: 12 MG/DL
CALCIUM SERPL-MCNC: 9.7 MG/DL
CHLORIDE SERPL-SCNC: 101 MMOL/L
CO2 SERPL-SCNC: 27 MMOL/L
CREAT SERPL-MCNC: 0.9 MG/DL
DIFFERENTIAL METHOD: ABNORMAL
EOSINOPHIL # BLD AUTO: 0.1 K/UL
EOSINOPHIL NFR BLD: 2 %
ERYTHROCYTE [DISTWIDTH] IN BLOOD BY AUTOMATED COUNT: 12.7 %
EST. GFR  (AFRICAN AMERICAN): >60 ML/MIN/1.73 M^2
EST. GFR  (NON AFRICAN AMERICAN): >60 ML/MIN/1.73 M^2
GLUCOSE SERPL-MCNC: 227 MG/DL
HCT VFR BLD AUTO: 45.4 %
HGB BLD-MCNC: 16.1 G/DL
LYMPHOCYTES # BLD AUTO: 1.1 K/UL
LYMPHOCYTES NFR BLD: 17.9 %
MCH RBC QN AUTO: 31.1 PG
MCHC RBC AUTO-ENTMCNC: 35.5 G/DL
MCV RBC AUTO: 88 FL
MONOCYTES # BLD AUTO: 0.7 K/UL
MONOCYTES NFR BLD: 11.4 %
NEUTROPHILS # BLD AUTO: 4 K/UL
NEUTROPHILS NFR BLD: 68.4 %
PLATELET # BLD AUTO: 187 K/UL
PMV BLD AUTO: 12.8 FL
POTASSIUM SERPL-SCNC: 4.4 MMOL/L
PROT SERPL-MCNC: 7.6 G/DL
RBC # BLD AUTO: 5.18 M/UL
SODIUM SERPL-SCNC: 138 MMOL/L
WBC # BLD AUTO: 5.86 K/UL

## 2018-11-02 PROCEDURE — 80053 COMPREHEN METABOLIC PANEL: CPT

## 2018-11-02 PROCEDURE — 85025 COMPLETE CBC W/AUTO DIFF WBC: CPT

## 2018-11-02 PROCEDURE — 36415 COLL VENOUS BLD VENIPUNCTURE: CPT | Mod: PO

## 2018-11-02 PROCEDURE — 83036 HEMOGLOBIN GLYCOSYLATED A1C: CPT

## 2018-11-03 LAB
ESTIMATED AVG GLUCOSE: 246 MG/DL
HBA1C MFR BLD HPLC: 10.2 %

## 2018-11-05 ENCOUNTER — OFFICE VISIT (OUTPATIENT)
Dept: FAMILY MEDICINE | Facility: CLINIC | Age: 38
End: 2018-11-05
Payer: COMMERCIAL

## 2018-11-05 VITALS
BODY MASS INDEX: 24.97 KG/M2 | HEART RATE: 97 BPM | DIASTOLIC BLOOD PRESSURE: 84 MMHG | SYSTOLIC BLOOD PRESSURE: 112 MMHG | TEMPERATURE: 99 F | WEIGHT: 169.06 LBS | OXYGEN SATURATION: 97 %

## 2018-11-05 DIAGNOSIS — E11.9 TYPE 2 DIABETES MELLITUS WITHOUT COMPLICATION, WITHOUT LONG-TERM CURRENT USE OF INSULIN: ICD-10-CM

## 2018-11-05 DIAGNOSIS — J06.9 UPPER RESPIRATORY INFECTION WITH COUGH AND CONGESTION: Primary | ICD-10-CM

## 2018-11-05 DIAGNOSIS — Z71.2 ENCOUNTER TO DISCUSS TEST RESULTS: ICD-10-CM

## 2018-11-05 PROCEDURE — 3046F HEMOGLOBIN A1C LEVEL >9.0%: CPT | Mod: CPTII,S$GLB,, | Performed by: INTERNAL MEDICINE

## 2018-11-05 PROCEDURE — 99999 PR PBB SHADOW E&M-EST. PATIENT-LVL III: CPT | Mod: PBBFAC,,, | Performed by: INTERNAL MEDICINE

## 2018-11-05 PROCEDURE — 99214 OFFICE O/P EST MOD 30 MIN: CPT | Mod: S$GLB,,, | Performed by: INTERNAL MEDICINE

## 2018-11-05 PROCEDURE — 3008F BODY MASS INDEX DOCD: CPT | Mod: CPTII,S$GLB,, | Performed by: INTERNAL MEDICINE

## 2018-11-05 RX ORDER — FLUTICASONE PROPIONATE 50 MCG
1 SPRAY, SUSPENSION (ML) NASAL DAILY
Qty: 16 G | Refills: 0 | Status: SHIPPED | OUTPATIENT
Start: 2018-11-05 | End: 2021-06-16

## 2018-11-05 RX ORDER — CODEINE PHOSPHATE AND GUAIFENESIN 10; 100 MG/5ML; MG/5ML
5 SOLUTION ORAL NIGHTLY PRN
Qty: 120 ML | Refills: 0 | Status: SHIPPED | OUTPATIENT
Start: 2018-11-05 | End: 2018-11-15

## 2018-11-05 NOTE — PROGRESS NOTES
SUBJECTIVE     Chief Complaint   Patient presents with    Cough     sx started about 1 week ago.,     Nasal Congestion    Otalgia    Headache    Sore Throat       HPI  Jay Meyer is a 38 y.o. male with multiple medical diagnoses as listed in the medical history and problem list that presents for evaluation of URI x 1 week. Pt reports a dry cough, nasal congestion, B/L ear fullness, headache, and sore throat. Denies any fever, but has had chills and night sweats. Pt has been taking Ricola and Latrice Denton with minimal improvement of symptoms. Of note, pt is currently on Doxycyline for an abscess. Denies any sick contacts/recent travel. Pt went to an urgent care and received a Rx for Claritin and Promethazine-DM without improvement.    PAST MEDICAL HISTORY:  Past Medical History:   Diagnosis Date    Diabetes mellitus type I     since 2003; dx at 22       PAST SURGICAL HISTORY:  History reviewed. No pertinent surgical history.    SOCIAL HISTORY:  Social History     Socioeconomic History    Marital status:      Spouse name: Not on file    Number of children: Not on file    Years of education: Not on file    Highest education level: Not on file   Social Needs    Financial resource strain: Not on file    Food insecurity - worry: Not on file    Food insecurity - inability: Not on file    Transportation needs - medical: Not on file    Transportation needs - non-medical: Not on file   Occupational History     Comment:    Tobacco Use    Smoking status: Never Smoker    Smokeless tobacco: Current User     Types: Snuff   Substance and Sexual Activity    Alcohol use: Yes     Alcohol/week: 0.0 oz    Drug use: Not on file    Sexual activity: Yes     Partners: Female   Other Topics Concern    Not on file   Social History Narrative    Not on file       FAMILY HISTORY:  Family History   Problem Relation Age of Onset    Diabetes Father     Diabetes Brother     Vision loss Brother      Diabetes type II Paternal Grandfather     Diabetes type II Brother        ALLERGIES AND MEDICATIONS: updated and reviewed.  Review of patient's allergies indicates:  No Known Allergies  Current Outpatient Medications   Medication Sig Dispense Refill    APIDRA 100 unit/mL injection       atorvastatin (LIPITOR) 40 MG tablet Take 1 tablet (40 mg total) by mouth once daily. 90 tablet 1    azelastine (ASTELIN) 137 mcg (0.1 %) nasal spray 1 spray (137 mcg total) by Nasal route 2 (two) times daily. 30 mL 1    cetirizine (ZYRTEC) 10 MG tablet Take 1 tablet (10 mg total) by mouth once daily. 30 tablet 2    ciclopirox (PENLAC) 8 % Soln Apply topically nightly. 6.6 mL 11    CONTOUR NEXT STRIPS Strp       doxycycline (VIBRA-TABS) 100 MG tablet Take 1 tablet (100 mg total) by mouth 2 (two) times daily. for 10 days 20 tablet 0    fluticasone (FLONASE) 50 mcg/actuation nasal spray 1 spray (50 mcg total) by Each Nare route once daily. 16 g 0    guaifenesin-codeine 100-10 mg/5 ml (TUSSI-ORGANIDIN NR)  mg/5 mL syrup Take 5 mLs by mouth nightly as needed for Cough or Congestion (MAY CAUSE DROWSINESS). 120 mL 0    HYDROcodone-acetaminophen (NORCO) 5-325 mg per tablet Take 1 tablet by mouth every 6 (six) hours as needed for Pain. 8 tablet 0    insulin glargine (LANTUS SOLOSTAR U-100 INSULIN) 100 unit/mL (3 mL) InPn pen Inject 40 Units into the skin every evening. 15 mL 5    insulin lispro (HUMALOG KWIKPEN INSULIN) 100 unit/mL InPn pen Take 12 units before breakfast, 15 units before lunch, and 15 units before dinner 15 mL 5    levocetirizine (XYZAL) 5 MG tablet TAKE 1 TABLET BY MOUTH IN THE EVENING 30 tablet 5    lisinopril (PRINIVIL,ZESTRIL) 2.5 MG tablet Take 1 tablet (2.5 mg total) by mouth once daily. 90 tablet 1    loratadine-pseudoephedrine 5-120 mg (CLARITIN-D 12 HOUR) 5-120 mg per tablet Take 1 tablet by mouth 2 (two) times daily. for 14 days 28 tablet 0     No current facility-administered medications  for this visit.        ROS  Review of Systems   Constitutional: Positive for chills. Negative for fever.   HENT: Positive for congestion, ear pain and sore throat. Negative for hearing loss.    Eyes: Negative for visual disturbance.   Respiratory: Positive for cough. Negative for shortness of breath.    Cardiovascular: Negative for chest pain, palpitations and leg swelling.   Gastrointestinal: Negative for abdominal pain, constipation, diarrhea, nausea and vomiting.   Genitourinary: Negative for dysuria, frequency and urgency.   Musculoskeletal: Negative for arthralgias, joint swelling and myalgias.   Skin: Negative for rash and wound.   Neurological: Positive for headaches.   Psychiatric/Behavioral: Negative for agitation and confusion. The patient is not nervous/anxious.          OBJECTIVE     Physical Exam  Vitals:    11/05/18 1541   BP: 112/84   Pulse: 97   Temp: 98.6 °F (37 °C)    Body mass index is 24.97 kg/m².  Weight: 76.7 kg (169 lb 1.5 oz)         Physical Exam   Constitutional: He is oriented to person, place, and time. He appears well-developed and well-nourished. No distress.   HENT:   Head: Normocephalic and atraumatic.   Right Ear: Hearing, tympanic membrane and external ear normal.   Left Ear: Hearing, tympanic membrane and external ear normal.   Nose: Nose normal. No rhinorrhea. Right sinus exhibits no maxillary sinus tenderness and no frontal sinus tenderness. Left sinus exhibits no maxillary sinus tenderness and no frontal sinus tenderness.   Mouth/Throat: No uvula swelling. Posterior oropharyngeal erythema present. No posterior oropharyngeal edema.   Eyes: Conjunctivae and EOM are normal. Right eye exhibits no discharge. Left eye exhibits no discharge. No scleral icterus.   Neck: Normal range of motion. Neck supple. No JVD present. No tracheal deviation present.   Cardiovascular: Normal rate, regular rhythm, normal heart sounds and intact distal pulses. Exam reveals no gallop and no friction rub.    No murmur heard.  Pulmonary/Chest: Effort normal and breath sounds normal. No respiratory distress. He has no wheezes.   Abdominal: Soft. Bowel sounds are normal. He exhibits no distension and no mass. There is no tenderness. There is no rebound and no guarding.   Musculoskeletal: Normal range of motion. He exhibits no edema, tenderness or deformity.   Neurological: He is alert and oriented to person, place, and time. He exhibits normal muscle tone. Coordination normal.   Skin: Skin is warm and dry. No rash noted. No erythema.   Psychiatric: He has a normal mood and affect. His behavior is normal. Judgment and thought content normal.         Health Maintenance       Date Due Completion Date    Foot Exam 07/04/1991 ---    TETANUS VACCINE 07/04/1999 ---    Pneumococcal PPSV23 (Medium Risk) (1) 07/04/1999 ---    Eye Exam 10/16/2018 10/16/2017    Influenza Vaccine 12/20/2018 (Originally 8/1/2018) ---    Lipid Panel 04/10/2019 4/10/2018    Hemoglobin A1c 05/02/2019 11/2/2018    Override on 5/17/2016: Done (8.4  dr wise)            ASSESSMENT     38 y.o. male with     1. Upper respiratory infection with cough and congestion    2. Encounter to discuss test results    3. Type 2 diabetes mellitus without complication, without long-term current use of insulin        PLAN:     1. Upper respiratory infection with cough and congestion  - Continue symptomatic treatment with rest, increase fluid intake, tylenol or ibuprofen PRN fever(temp >/= 100.4) or body aches. Okay to take OTC antihistamines, i.e. Bendaryl, Claritin, Allegra, etc. as needed.  - Okay to gargle with warm, salt water or use throat lozenges as needed  - fluticasone (FLONASE) 50 mcg/actuation nasal spray; 1 spray (50 mcg total) by Each Nare route once daily.  Dispense: 16 g; Refill: 0  - guaifenesin-codeine 100-10 mg/5 ml (TUSSI-ORGANIDIN NR)  mg/5 mL syrup; Take 5 mLs by mouth nightly as needed for Cough or Congestion (MAY CAUSE DROWSINESS).  Dispense: 120  mL; Refill: 0    2. Encounter to discuss test results  - Discussed recent lab results  - All questions/concerns addressed  - Pt voiced understanding    3. Type 2 diabetes mellitus without complication, without long-term current use of insulin  - Ambulatory referral to Endocrinology        RTC in 1-2 weeks as needed for any acute worsening of current condition or failure to improve       Izabela Currie MD  11/05/2018 3:57 PM        No Follow-up on file.

## 2018-12-04 NOTE — PROGRESS NOTES
CC: This 38 y.o. White male  is here for evaluation of  TDM along with comorbidities indicated in the Visit Diagnosis section of this encounter.    HPI: Jay Meyer was diagnosed with DM in 2003 at age 22. Insulin started at the time of diagnosis. He has been on a [employer] insulin pump and a CGM for a couple of years under Dr. Baeza's care before stopping it in 2016 because it kept getting detached.     New to Endocrine. Referred by Dr. Izabela Currie. Patient comes today stating he did not know what today's visit was for. He was largely unengaged and answered most questions with a short yes/no unless otherwise prompted for more detail.     Unsure if patient has type 1 or type 2 diabetes.     a1c edison from 8.7 to 10.2% recently. He states it's because he has been skipping lunch and not taking his Humalog. skips Humalog about 1x/day at most; says he usually injects Humalog 3x/day now.     LAST DIABETES EDUCATION: yes     HOSPITALIZED FOR DIABETES  -  No.    PRESCRIBED DIABETES MEDICATIONS: Humalog Kiwkpen 10-16 units ac, Basaglar 40 units hs     He injects Humalog about 10-15 minutes after he eats bc he doesn't want to bottom out. He takes the amount of Humalog based off an insulin dose calculator carie on his phone. He doesn't know what his carb ratio is. Sometimes also enters into the carie what his BG is but not always.     DM COMPLICATIONS: none    SIGNIFICANT DIABETES MED HISTORY:   Avoid incretin mimetics d/t pancreatitis ~ 2005     SELF MONITORING BLOOD GLUCOSE: Checks blood glucose at home  - very rarely. Cannot afford the strips.     HYPOGLYCEMIC EPISODES: feels shaky like his BG is low in the afternoon, even if he does get a lunch in.      CURRENT DIET: drinks coke zero, tea without sugar, water. Eats 2-3 meals/day. Skips lunch. Uses Calorie Link to count carbs.   Diet recall: Dinner was tamales 5; lunch was [employer] hamburger 2, diet coke, medium fries. Breakfast was pop tarts 2, coffee with AS.  "    CURRENT EXERCISE: none formal; plays softball.     SOCIAL: fixes/installs slot machines.       /80 (BP Location: Right arm, Patient Position: Sitting, BP Method: Large (Automatic))   Pulse 71   Ht 5' 9" (1.753 m)   Wt 77.4 kg (170 lb 10.2 oz)   BMI 25.20 kg/m²       ROS:   CONSTITUTIONAL: Appetite good, denies fatigue  SKIN: No rash or pruritis   EYES: No visual disturbances  RESPIRATORY: No shortness of breath or cough  CARDIAC: No chest pain or palpitations  GI: No nausea, vomiting, or diarrhea  : No urinary frequency or dysuria   MS: No arthralgias or mylagias   NEURO: No paresthesias or tremors.   PSYCH: No depression  OTHER: n/a      PHYSICAL EXAM:  GENERAL: Well developed, well nourished. No acute distress.   PSYCH: AAOx3, appropriate mood and affect, conversant, well-groomed. Judgement and insight good.   NEURO: Cranial nerves grossly intact. Speech clear, no tremor.   NECK: Trachea midline, no thyromegaly or lymphadenopathy.   CHEST: Respirations even and unlabored. CTA bilaterally.  CARDIOVASCULAR: Regular rate and rhythm. No bruits. No murmur. No edema.   ABDOMEN: Soft, non-tender, non-distended. Bowel sounds present.   MS: Gait steady. No clubbing.   SKIN: Normal skin turgor. Skin warm and dry. No areas of breakdown. No acanthosis nigricans.  FEET:     Protective Sensation (w/ 10 gram monofilament):  Right: Intact  Left: Intact    vibratory sensation decreased to BLE.     Visual Inspection:  Skin Breakdown -  Neither    Pedal Pulses:   Right: Present  Left: Present    Posterior tibialis:   Right:Present  Left: Present      Hemoglobin A1C   Date Value Ref Range Status   11/02/2018 10.2 (H) 4.0 - 5.6 % Final     Comment:     ADA Screening Guidelines:  5.7-6.4%  Consistent with prediabetes  >or=6.5%  Consistent with diabetes  High levels of fetal hemoglobin interfere with the HbA1C  assay. Heterozygous hemoglobin variants (HbS, HgC, etc)do  not significantly interfere with this assay. "   However, presence of multiple variants may affect accuracy.     04/10/2018 8.7 (H) 4.0 - 5.6 % Final     Comment:     According to ADA guidelines, hemoglobin A1c <7.0% represents  optimal control in non-pregnant diabetic patients. Different  metrics may apply to specific patient populations.   Standards of Medical Care in Diabetes-2016.  For the purpose of screening for the presence of diabetes:  <5.7%     Consistent with the absence of diabetes  5.7-6.4%  Consistent with increasing risk for diabetes   (prediabetes)  >or=6.5%  Consistent with diabetes  Currently, no consensus exists for use of hemoglobin A1c  for diagnosis of diabetes for children.  This Hemoglobin A1c assay has significant interference with fetal   hemoglobin   (HbF). The results are invalid for patients with abnormal amounts of   HbF,   including those with known Hereditary Persistence   of Fetal Hemoglobin. Heterozygous hemoglobin variants (HbAS, HbAC,   HbAD, HbAE, HbA2) do not significantly interfere with this assay;   however, presence of multiple variants in a sample may impact the %   interference.             Chemistry        Component Value Date/Time     11/02/2018 1024    K 4.4 11/02/2018 1024     11/02/2018 1024    CO2 27 11/02/2018 1024    BUN 12 11/02/2018 1024    CREATININE 0.9 11/02/2018 1024     (H) 11/02/2018 1024        Component Value Date/Time    CALCIUM 9.7 11/02/2018 1024    ALKPHOS 181 (H) 11/02/2018 1024    AST 28 11/02/2018 1024    ALT 44 11/02/2018 1024    BILITOT 1.2 (H) 11/02/2018 1024    ESTGFRAFRICA >60 11/02/2018 1024    EGFRNONAA >60 11/02/2018 1024          Lab Results   Component Value Date    LDLCALC 120.2 04/10/2018        Ref. Range 4/10/2018 09:18   Cholesterol Latest Ref Range: 120 - 199 mg/dL 198   HDL Latest Ref Range: 40 - 75 mg/dL 30 (L)   HDL/Chol Ratio Latest Ref Range: 20.0 - 50.0 % 15.2 (L)   LDL Cholesterol Latest Ref Range: 63.0 - 159.0 mg/dL 120.2   Non-HDL Cholesterol Latest  Units: mg/dL 168   Total Cholesterol/HDL Ratio Latest Ref Range: 2.0 - 5.0  6.6 (H)   Triglycerides Latest Ref Range: 30 - 150 mg/dL 239 (H)     Lab Results   Component Value Date    MICALBCREAT 32.7 (H) 04/10/2018           STANDARDS of CARE:        ASA:               Last eye exam:       ASSESSMENT and PLAN:    A1C GOAL: < 7 %     1. Diabetes mellitus type 1, uncontrolled, without complications  Discussed progression of DM disease, long term complications, and tx options. Reviewed A1c and BG goals.    Labs today to confirm type 1 diabetes diagnosis.     Prescription for testing supplies sent to pharmacy. Please call if having issues getting your strips at affordable price.     Ideally test your blood sugar before each meal and bedtime, and also before driving. Recommend glucose at least 120 before driving.     Continue current insulin doses - unable to adjust insulin when no blood sugar logs are available.     Return to clinic in 2 months with labs prior.     C-peptide    Glucose, random    Glutamic acid decarboxylase    Hemoglobin A1c    Lipid panel    Microalbumin/creatinine urine ratio    Insulin antibody    Anti-islet cell antibody   2. Hyperlipidemia LDL goal <100  Lipid panel       Orders Placed This Encounter   Procedures    C-peptide     Standing Status:   Future     Standing Expiration Date:   2/3/2020    Glucose, random     Standing Status:   Future     Standing Expiration Date:   2/3/2020    Glutamic acid decarboxylase     Standing Status:   Future     Standing Expiration Date:   2/3/2020    Hemoglobin A1c     Standing Status:   Future     Standing Expiration Date:   2/3/2020    Lipid panel     Standing Status:   Future     Standing Expiration Date:   12/5/2019    Microalbumin/creatinine urine ratio     Standing Status:   Future     Standing Expiration Date:   2/3/2020     Order Specific Question:   Specimen Source     Answer:   Urine    Insulin antibody     Standing Status:   Future     Standing  Expiration Date:   2/3/2020    Anti-islet cell antibody     Standing Status:   Future     Standing Expiration Date:   2/3/2020        Follow-up in about 2 months (around 2/5/2019).     Thank you very much for allowing me to participate in Jay Meyer's care.

## 2018-12-05 ENCOUNTER — OFFICE VISIT (OUTPATIENT)
Dept: ENDOCRINOLOGY | Facility: CLINIC | Age: 38
End: 2018-12-05
Payer: COMMERCIAL

## 2018-12-05 ENCOUNTER — LAB VISIT (OUTPATIENT)
Dept: LAB | Facility: HOSPITAL | Age: 38
End: 2018-12-05
Attending: NURSE PRACTITIONER
Payer: COMMERCIAL

## 2018-12-05 VITALS
SYSTOLIC BLOOD PRESSURE: 118 MMHG | DIASTOLIC BLOOD PRESSURE: 80 MMHG | HEART RATE: 71 BPM | WEIGHT: 170.63 LBS | HEIGHT: 69 IN | BODY MASS INDEX: 25.27 KG/M2

## 2018-12-05 DIAGNOSIS — E78.5 HYPERLIPIDEMIA LDL GOAL <100: ICD-10-CM

## 2018-12-05 LAB
C PEPTIDE SERPL-MCNC: 0.13 NG/ML
CHOLEST SERPL-MCNC: 100 MG/DL
CHOLEST/HDLC SERPL: 3.1 {RATIO}
GLUCOSE SERPL-MCNC: 322 MG/DL
HDLC SERPL-MCNC: 32 MG/DL
HDLC SERPL: 32 %
LDLC SERPL CALC-MCNC: 45.2 MG/DL
NONHDLC SERPL-MCNC: 68 MG/DL
TRIGL SERPL-MCNC: 114 MG/DL

## 2018-12-05 PROCEDURE — 99999 PR PBB SHADOW E&M-EST. PATIENT-LVL III: CPT | Mod: PBBFAC,,, | Performed by: NURSE PRACTITIONER

## 2018-12-05 PROCEDURE — 86337 INSULIN ANTIBODIES: CPT

## 2018-12-05 PROCEDURE — 86341 ISLET CELL ANTIBODY: CPT

## 2018-12-05 PROCEDURE — 99214 OFFICE O/P EST MOD 30 MIN: CPT | Mod: S$GLB,,, | Performed by: NURSE PRACTITIONER

## 2018-12-05 PROCEDURE — 3046F HEMOGLOBIN A1C LEVEL >9.0%: CPT | Mod: CPTII,S$GLB,, | Performed by: NURSE PRACTITIONER

## 2018-12-05 PROCEDURE — 84681 ASSAY OF C-PEPTIDE: CPT

## 2018-12-05 PROCEDURE — 3008F BODY MASS INDEX DOCD: CPT | Mod: CPTII,S$GLB,, | Performed by: NURSE PRACTITIONER

## 2018-12-05 PROCEDURE — 36415 COLL VENOUS BLD VENIPUNCTURE: CPT | Mod: PN

## 2018-12-05 PROCEDURE — 82947 ASSAY GLUCOSE BLOOD QUANT: CPT

## 2018-12-05 PROCEDURE — 80061 LIPID PANEL: CPT

## 2018-12-05 PROCEDURE — 86341 ISLET CELL ANTIBODY: CPT | Mod: 91

## 2018-12-05 RX ORDER — LANCETS
EACH MISCELLANEOUS
Qty: 400 EACH | Refills: 3 | Status: SHIPPED | OUTPATIENT
Start: 2018-12-05 | End: 2021-06-16 | Stop reason: SDUPTHER

## 2018-12-05 RX ORDER — INSULIN PUMP SYRINGE, 3 ML
EACH MISCELLANEOUS
Qty: 1 EACH | Refills: 0 | Status: SHIPPED | OUTPATIENT
Start: 2018-12-05 | End: 2022-04-20

## 2018-12-05 NOTE — LETTER
December 5, 2018      Izabela Currie MD  [address]  [address]  [place] [address]           [place] [facility]  [address], [address]  [place] [address]  Phone: [phone]  Fax: [phone]          Patient: Jay Meyer   MR Number: [id number]   YOB: 1980   Date of Visit: 12/5/2018       Dear Dr. Izabela Currie:    Thank you for referring Jay Meyer to me for evaluation. Attached you will find relevant portions of my assessment and plan of care.    If you have questions, please do not hesitate to call me. I look forward to following Jay Meyer along with you.    Sincerely,    Ina Nunn NP    Enclosure  CC:  No Recipients    If you would like to receive this communication electronically, please contact [email or web address] or [phone] to request more information on [employer] access.    For providers and/or their staff who would like to refer a patient to [facility], please contact us through our one-stop-shop provider referral line, [facility], at [phone].    If you feel you have received this communication in error or would no longer like to receive these types of communications, please e-mail [email or web address]

## 2018-12-05 NOTE — PATIENT INSTRUCTIONS
A1C goal: <7%  Fasting/premeal blood glucose goal:   2 hour post-meal blood glucose goal: less than 180      Labs today to confirm type 1 diabetes diagnosis.     Prescription for testing supplies sent to pharmacy. Please call if having issues getting your strips at affordable price.     Ideally test your blood sugar before each meal and bedtime, and also before driving. Recommend glucose at least 120 before driving.     Continue current insulin doses - unable to adjust insulin when no blood sugar logs are available.     Return to clinic in 2 months with labs prior.

## 2018-12-08 LAB — PANC ISLET CELL IGG SER-ACNC: NORMAL

## 2018-12-11 LAB — INSULIN AB SER-SCNC: 0.09 NMOL/L (ref 0–0.02)

## 2018-12-13 ENCOUNTER — TELEPHONE (OUTPATIENT)
Dept: ENDOCRINOLOGY | Facility: CLINIC | Age: 38
End: 2018-12-13

## 2018-12-13 LAB — GAD65 AB SER-SCNC: 14 NMOL/L

## 2019-01-21 ENCOUNTER — OFFICE VISIT (OUTPATIENT)
Dept: FAMILY MEDICINE | Facility: CLINIC | Age: 39
End: 2019-01-21
Payer: COMMERCIAL

## 2019-01-21 VITALS
SYSTOLIC BLOOD PRESSURE: 130 MMHG | RESPIRATION RATE: 18 BRPM | TEMPERATURE: 99 F | OXYGEN SATURATION: 97 % | BODY MASS INDEX: 25.7 KG/M2 | HEART RATE: 88 BPM | DIASTOLIC BLOOD PRESSURE: 78 MMHG | HEIGHT: 69 IN | WEIGHT: 173.5 LBS

## 2019-01-21 DIAGNOSIS — S93.401A SPRAIN OF RIGHT ANKLE, UNSPECIFIED LIGAMENT, INITIAL ENCOUNTER: Primary | ICD-10-CM

## 2019-01-21 DIAGNOSIS — E11.29 MICROALBUMINURIA DUE TO TYPE 2 DIABETES MELLITUS: ICD-10-CM

## 2019-01-21 DIAGNOSIS — E11.9 TYPE 2 DIABETES MELLITUS WITHOUT COMPLICATION, WITHOUT LONG-TERM CURRENT USE OF INSULIN: ICD-10-CM

## 2019-01-21 DIAGNOSIS — R80.9 MICROALBUMINURIA DUE TO TYPE 2 DIABETES MELLITUS: ICD-10-CM

## 2019-01-21 PROCEDURE — 99999 PR PBB SHADOW E&M-EST. PATIENT-LVL III: CPT | Mod: PBBFAC,,, | Performed by: INTERNAL MEDICINE

## 2019-01-21 PROCEDURE — 99999 PR PBB SHADOW E&M-EST. PATIENT-LVL III: ICD-10-PCS | Mod: PBBFAC,,, | Performed by: INTERNAL MEDICINE

## 2019-01-21 PROCEDURE — 3008F PR BODY MASS INDEX (BMI) DOCUMENTED: ICD-10-PCS | Mod: CPTII,S$GLB,, | Performed by: INTERNAL MEDICINE

## 2019-01-21 PROCEDURE — 3046F PR MOST RECENT HEMOGLOBIN A1C LEVEL > 9.0%: ICD-10-PCS | Mod: CPTII,S$GLB,, | Performed by: INTERNAL MEDICINE

## 2019-01-21 PROCEDURE — 3008F BODY MASS INDEX DOCD: CPT | Mod: CPTII,S$GLB,, | Performed by: INTERNAL MEDICINE

## 2019-01-21 PROCEDURE — 99214 OFFICE O/P EST MOD 30 MIN: CPT | Mod: S$GLB,,, | Performed by: INTERNAL MEDICINE

## 2019-01-21 PROCEDURE — 3046F HEMOGLOBIN A1C LEVEL >9.0%: CPT | Mod: CPTII,S$GLB,, | Performed by: INTERNAL MEDICINE

## 2019-01-21 PROCEDURE — 99214 PR OFFICE/OUTPT VISIT, EST, LEVL IV, 30-39 MIN: ICD-10-PCS | Mod: S$GLB,,, | Performed by: INTERNAL MEDICINE

## 2019-01-21 RX ORDER — IBUPROFEN 800 MG/1
800 TABLET ORAL 3 TIMES DAILY PRN
Qty: 60 TABLET | Refills: 0 | Status: SHIPPED | OUTPATIENT
Start: 2019-01-21 | End: 2020-04-30 | Stop reason: SDUPTHER

## 2019-01-21 NOTE — LETTER
January 21, 2019      [place]  [address]  [address]  [place] [address]  Phone: [phone]  Fax: [phone]       Patient: Jay Meyer   YOB: 1980  Date of Visit: 01/21/2019    To Whom It May Concern:    Neptali Meyer  was at [facility] on 01/21/2019. He may return to work/school on 1/23/19 with no restrictions. If you have any questions or concerns, or if I can be of further assistance, please do not hesitate to contact me.    Sincerely,    Izabela Currie MD
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
## 2019-01-23 ENCOUNTER — OFFICE VISIT (OUTPATIENT)
Dept: URGENT CARE | Facility: CLINIC | Age: 39
End: 2019-01-23
Payer: COMMERCIAL

## 2019-01-23 VITALS
SYSTOLIC BLOOD PRESSURE: 115 MMHG | BODY MASS INDEX: 24.77 KG/M2 | HEART RATE: 86 BPM | WEIGHT: 173 LBS | DIASTOLIC BLOOD PRESSURE: 78 MMHG | RESPIRATION RATE: 18 BRPM | HEIGHT: 70 IN | TEMPERATURE: 98 F | OXYGEN SATURATION: 97 %

## 2019-01-23 DIAGNOSIS — M25.571 ACUTE RIGHT ANKLE PAIN: Primary | ICD-10-CM

## 2019-01-23 PROCEDURE — 73610 X-RAY EXAM OF ANKLE: CPT | Mod: RT,S$GLB,, | Performed by: RADIOLOGY

## 2019-01-23 PROCEDURE — 73610 XR ANKLE COMPLETE 3 VIEW RIGHT: ICD-10-PCS | Mod: RT,S$GLB,, | Performed by: RADIOLOGY

## 2019-01-23 PROCEDURE — 3008F PR BODY MASS INDEX (BMI) DOCUMENTED: ICD-10-PCS | Mod: CPTII,S$GLB,, | Performed by: NURSE PRACTITIONER

## 2019-01-23 PROCEDURE — 99214 OFFICE O/P EST MOD 30 MIN: CPT | Mod: S$GLB,,, | Performed by: NURSE PRACTITIONER

## 2019-01-23 PROCEDURE — 99214 PR OFFICE/OUTPT VISIT, EST, LEVL IV, 30-39 MIN: ICD-10-PCS | Mod: S$GLB,,, | Performed by: NURSE PRACTITIONER

## 2019-01-23 PROCEDURE — 3008F BODY MASS INDEX DOCD: CPT | Mod: CPTII,S$GLB,, | Performed by: NURSE PRACTITIONER

## 2019-01-23 NOTE — PATIENT INSTRUCTIONS
REST, ICE, COMPRESS AND ELEVATE FOOT    TYLENOL AND IBUPROFEN FOR PAIN    PAIN IS YOUR LIMITATION- IF IT HURTS, DO NOT DO IT    RETURN TO REGULAR ACTIVITY SLOWLY    Understanding Ankle Sprain    The ankle is the joint where the leg and foot meet. Bones are held in place by connective tissue called ligaments. When ankle ligaments are stretched to the point of pain and injury, it is called an ankle sprain. A sprain can tear the ligaments. These tears can be very small but still cause pain. Ankle sprains can be mild or severe.  What causes an ankle sprain?  A sprain may occur when you twist your ankle or bend it too far. This can happen when you stumble or fall. Things that can make an ankle sprain more likely include:  · Having had an ankle sprain before  · Playing sports that involve running and jumping. Or playing contact sports such as football or hockey.  · Wearing shoes that dont support your feet and ankles well  · Having ankles with poor strength and flexibility  Symptoms of an ankle sprain  Symptoms may include:  · Pain or soreness in the ankle  · Swelling  · Redness or bruising  · Not being able to walk or put weight on the affected foot  · Reduced range of motion in the ankle  · A popping or tearing feeling at the time the sprain occurs  · An abnormal or dislocated look to the ankle  · Instability or too much range of motion in the ankle  Treatment for an ankle sprain  Treatment focuses on reducing pain and swelling, and avoiding further injury. Treatments may include:  · Resting the ankle. Avoid putting weight on it. This may mean using crutches until the sprain heals.  · Prescription or over-the-counter pain medicines. These help reduce swelling and pain.  · Cold packs. These help reduce pain and swelling.  · Raising your ankle above your heart. This helps reduce swelling.  · Wrapping the ankle with an elastic bandage or ankle brace. This helps reduce swelling and gives some support to the ankle. In  rare cases, you may need a cast or boot.  · Stretching and other exercises. These improve flexibility and strength.  · Heat packs. These may be recommended before doing ankle exercises.  Possible complications of an ankle sprain  An ankle that has been weakened by a sprain can be more likely to have repeated sprains afterward. Doing exercises to strengthen your ankle and improve balance can reduce your risk for repeated sprains. Other possible complications are long-term (chronic) pain or an ankle that remains unstable.  When to call your healthcare provider  Call your healthcare provider right away if you have any of these:  · Fever of 100.4°F (38°C) or higher, or as directed  · Pain, numbness, discoloration, or coldness in the foot or toes  · Pain that gets worse  · Symptoms that dont get better, or get worse  · New symptoms   Date Last Reviewed: 3/10/2016  © 5106-4834 [employer]. [address] [place] [address], [place] [address]. All rights reserved. This information is not intended as a substitute for professional medical care. Always follow your healthcare professional's instructions.
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
## 2019-03-06 ENCOUNTER — TELEPHONE (OUTPATIENT)
Dept: FAMILY MEDICINE | Facility: CLINIC | Age: 39
End: 2019-03-06

## 2019-03-06 DIAGNOSIS — Z79.4 TYPE 2 DIABETES MELLITUS WITHOUT COMPLICATION, WITH LONG-TERM CURRENT USE OF INSULIN: Primary | ICD-10-CM

## 2019-03-06 DIAGNOSIS — E11.9 TYPE 2 DIABETES MELLITUS WITHOUT COMPLICATION, WITH LONG-TERM CURRENT USE OF INSULIN: Primary | ICD-10-CM

## 2019-03-06 RX ORDER — INSULIN GLARGINE 100 [IU]/ML
40 INJECTION, SOLUTION SUBCUTANEOUS NIGHTLY
Qty: 15 ML | Refills: 1 | Status: SHIPPED | OUTPATIENT
Start: 2019-03-06 | End: 2019-03-28 | Stop reason: SDUPTHER

## 2019-03-06 NOTE — TELEPHONE ENCOUNTER
----- Message from Janice Nelson sent at 3/6/2019  1:47 PM CST -----  Contact: [facility]  [phone]  Pt insurance is not covering the insulin you sent, it will cover Basaglar insulin, Pls sent a script to [facility] [phone]. Thanks................Felicity

## 2019-03-06 NOTE — TELEPHONE ENCOUNTER
Spoke with pharmacist/ states that patient's insurance will cover either levemir or basaglar/ please advise

## 2019-03-28 DIAGNOSIS — Z79.4 TYPE 2 DIABETES MELLITUS WITHOUT COMPLICATION, WITH LONG-TERM CURRENT USE OF INSULIN: ICD-10-CM

## 2019-03-28 DIAGNOSIS — E11.9 TYPE 2 DIABETES MELLITUS WITHOUT COMPLICATION, WITH LONG-TERM CURRENT USE OF INSULIN: ICD-10-CM

## 2019-03-29 RX ORDER — INSULIN GLARGINE 100 [IU]/ML
INJECTION, SOLUTION SUBCUTANEOUS
Qty: 15 SYRINGE | Refills: 0 | Status: SHIPPED | OUTPATIENT
Start: 2019-03-29 | End: 2019-07-12 | Stop reason: SDUPTHER

## 2019-04-22 ENCOUNTER — OFFICE VISIT (OUTPATIENT)
Dept: FAMILY MEDICINE | Facility: CLINIC | Age: 39
End: 2019-04-22
Payer: COMMERCIAL

## 2019-04-22 VITALS
BODY MASS INDEX: 25.13 KG/M2 | DIASTOLIC BLOOD PRESSURE: 90 MMHG | OXYGEN SATURATION: 98 % | SYSTOLIC BLOOD PRESSURE: 126 MMHG | WEIGHT: 175.5 LBS | RESPIRATION RATE: 18 BRPM | HEIGHT: 70 IN | TEMPERATURE: 99 F | HEART RATE: 93 BPM

## 2019-04-22 DIAGNOSIS — Z79.4 TYPE 2 DIABETES MELLITUS WITHOUT COMPLICATION, WITH LONG-TERM CURRENT USE OF INSULIN: ICD-10-CM

## 2019-04-22 DIAGNOSIS — E11.9 TYPE 2 DIABETES MELLITUS WITHOUT COMPLICATION, WITH LONG-TERM CURRENT USE OF INSULIN: ICD-10-CM

## 2019-04-22 DIAGNOSIS — J30.9 ALLERGIC SINUSITIS: Primary | ICD-10-CM

## 2019-04-22 PROCEDURE — 99214 OFFICE O/P EST MOD 30 MIN: CPT | Mod: S$GLB,,, | Performed by: INTERNAL MEDICINE

## 2019-04-22 PROCEDURE — 99214 PR OFFICE/OUTPT VISIT, EST, LEVL IV, 30-39 MIN: ICD-10-PCS | Mod: S$GLB,,, | Performed by: INTERNAL MEDICINE

## 2019-04-22 PROCEDURE — 3008F BODY MASS INDEX DOCD: CPT | Mod: CPTII,S$GLB,, | Performed by: INTERNAL MEDICINE

## 2019-04-22 PROCEDURE — 99999 PR PBB SHADOW E&M-EST. PATIENT-LVL III: CPT | Mod: PBBFAC,,, | Performed by: INTERNAL MEDICINE

## 2019-04-22 PROCEDURE — 99999 PR PBB SHADOW E&M-EST. PATIENT-LVL III: ICD-10-PCS | Mod: PBBFAC,,, | Performed by: INTERNAL MEDICINE

## 2019-04-22 PROCEDURE — 3008F PR BODY MASS INDEX (BMI) DOCUMENTED: ICD-10-PCS | Mod: CPTII,S$GLB,, | Performed by: INTERNAL MEDICINE

## 2019-04-22 PROCEDURE — 3046F HEMOGLOBIN A1C LEVEL >9.0%: CPT | Mod: CPTII,S$GLB,, | Performed by: INTERNAL MEDICINE

## 2019-04-22 PROCEDURE — 3046F PR MOST RECENT HEMOGLOBIN A1C LEVEL > 9.0%: ICD-10-PCS | Mod: CPTII,S$GLB,, | Performed by: INTERNAL MEDICINE

## 2019-04-22 RX ORDER — AZELASTINE 1 MG/ML
1 SPRAY, METERED NASAL 2 TIMES DAILY
Qty: 30 ML | Refills: 3 | Status: SHIPPED | OUTPATIENT
Start: 2019-04-22 | End: 2021-06-16 | Stop reason: SDUPTHER

## 2019-04-22 NOTE — PROGRESS NOTES
SUBJECTIVE     Chief Complaint   Patient presents with    Sinus Problem       HPI  Jay Meyer is a 38 y.o. male with multiple medical diagnoses as listed in the medical history and problem list that presents for evaluation of URI x 2 days. Pt reports sneezing, nasal congestion, and facial pressure. Denies any fever, chills, or night sweats. Pt has been taking Afrin and Latrice [place] with some relief of symptoms. Denies any sick contacts/recent travel.    PAST MEDICAL HISTORY:  Past Medical History:   Diagnosis Date    Diabetes mellitus type I     since ; dx at 22       PAST SURGICAL HISTORY:  Past Surgical History:   Procedure Laterality Date    VASECTOMY         SOCIAL HISTORY:  Social History     Socioeconomic History    Marital status:      Spouse name: Not on file    Number of children: Not on file    Years of education: Not on file    Highest education level: Not on file   Occupational History     Comment:    Social Needs    Financial resource strain: Not on file    Food insecurity:     Worry: Not on file     Inability: Not on file    Transportation needs:     Medical: Not on file     Non-medical: Not on file   Tobacco Use    Smoking status: Former Smoker     Last attempt to quit: 2010     Years since quittin.2    Smokeless tobacco: Current User     Types: Snuff   Substance and Sexual Activity    Alcohol use: Yes     Alcohol/week: 1.2 oz     Types: 2 Cans of beer per week    Drug use: Not on file    Sexual activity: Yes     Partners: Female   Lifestyle    Physical activity:     Days per week: Not on file     Minutes per session: Not on file    Stress: Not on file   Relationships    Social connections:     Talks on phone: Not on file     Gets together: Not on file     Attends [employer] service: Not on file     Active member of club or organization: Not on file     Attends meetings of clubs or organizations: Not on file     Relationship status: Not on  file   Other Topics Concern    Not on file   Social History Narrative    Not on file       FAMILY HISTORY:  Family History   Problem Relation Age of Onset    Diabetes Father     Diabetes Brother     Vision loss Brother     Diabetes type II Paternal Grandfather     Heart attack Paternal Grandfather     Diabetes type II Brother        ALLERGIES AND MEDICATIONS: updated and reviewed.  Review of patient's allergies indicates:  No Known Allergies  Current Outpatient Medications   Medication Sig Dispense Refill    APIDRA 100 unit/mL injection       atorvastatin (LIPITOR) 40 MG tablet Take 1 tablet (40 mg total) by mouth once daily. 90 tablet 1    azelastine (ASTELIN) 137 mcg (0.1 %) nasal spray 1 spray (137 mcg total) by Nasal route 2 (two) times daily. 30 mL 3    BASAGLAR KWIKPEN U-100 INSULIN glargine 100 units/mL (3mL) SubQ pen INJECT 40 UNITS INTO THE SKIN EVERY EVENING. 15 Syringe 0    blood sugar diagnostic Strp To check BG 4 times daily, to use with insurance preferred meter 400 each 3    blood-glucose meter kit To check BG 4 times daily, to use with insurance preferred meter 1 each 0    ciclopirox (PENLAC) 8 % Soln Apply topically nightly. 6.6 mL 11    fluticasone (FLONASE) 50 mcg/actuation nasal spray 1 spray (50 mcg total) by Each Nare route once daily. 16 g 0    ibuprofen (ADVIL,MOTRIN) 800 MG tablet Take 1 tablet (800 mg total) by mouth 3 (three) times daily as needed for Pain (WITH MEALS). 60 tablet 0    insulin lispro (HUMALOG KWIKPEN INSULIN) 100 unit/mL InPn pen Take 12 units before breakfast, 15 units before lunch, and 15 units before dinner 15 mL 5    lancets Misc To check BG 4 times daily, to use with insurance preferred meter 400 each 3    levocetirizine (XYZAL) 5 MG tablet TAKE 1 TABLET BY MOUTH IN THE EVENING 30 tablet 5    lisinopril (PRINIVIL,ZESTRIL) 2.5 MG tablet Take 1 tablet (2.5 mg total) by mouth once daily. 90 tablet 1     No current facility-administered medications for  "this visit.        ROS  Review of Systems   Constitutional: Negative for chills and fever.   HENT: Positive for congestion, sinus pressure, sinus pain and sneezing. Negative for hearing loss and sore throat.    Eyes: Positive for discharge (watery) and itching. Negative for visual disturbance.   Respiratory: Negative for cough and shortness of breath.    Cardiovascular: Negative for chest pain, palpitations and leg swelling.   Gastrointestinal: Negative for abdominal pain, constipation, diarrhea, nausea and vomiting.   Genitourinary: Negative for dysuria, frequency and urgency.   Musculoskeletal: Negative for arthralgias, joint swelling and myalgias.   Skin: Negative for rash and wound.   Neurological: Negative for headaches.   Psychiatric/Behavioral: Negative for agitation and confusion. The patient is not nervous/anxious.          OBJECTIVE     Physical Exam  Vitals:    04/22/19 1352   BP: (!) 126/90   Pulse: 93   Resp: 18   Temp: 99.2 °F (37.3 °C)    Body mass index is 25.18 kg/m².  Weight: 79.6 kg (175 lb 7.8 oz)   Height: 5' 10" (177.8 cm)     Physical Exam   Constitutional: He is oriented to person, place, and time. He appears well-developed and well-nourished. No distress.   HENT:   Head: Normocephalic and atraumatic.   Right Ear: Hearing, tympanic membrane and external ear normal.   Left Ear: Hearing and external ear normal. Tympanic membrane is erythematous.   Nose: Rhinorrhea present. Right sinus exhibits no maxillary sinus tenderness and no frontal sinus tenderness. Left sinus exhibits no maxillary sinus tenderness and no frontal sinus tenderness.   Mouth/Throat: Oropharynx is clear and moist. No uvula swelling. No posterior oropharyngeal edema or posterior oropharyngeal erythema.   Eyes: Conjunctivae and EOM are normal. Right eye exhibits no discharge. Left eye exhibits no discharge. No scleral icterus.   Neck: Normal range of motion. Neck supple. No JVD present. No tracheal deviation present. "   Cardiovascular: Normal rate, regular rhythm, normal heart sounds and intact distal pulses. Exam reveals no gallop and no friction rub.   No murmur heard.  Pulmonary/Chest: Effort normal and breath sounds normal. No respiratory distress. He has no wheezes.   Abdominal: Soft. Bowel sounds are normal. He exhibits no distension and no mass. There is no tenderness. There is no rebound and no guarding.   Musculoskeletal: Normal range of motion. He exhibits no edema, tenderness or deformity.   Neurological: He is alert and oriented to person, place, and time. He exhibits normal muscle tone. Coordination normal.   Skin: Skin is warm and dry. No rash noted. No erythema.   Psychiatric: He has a normal mood and affect. His behavior is normal. Judgment and thought content normal.         Health Maintenance       Date Due Completion Date    TETANUS VACCINE 07/04/1999 ---    Pneumococcal Vaccine (Medium Risk) (1 of 1 - PPSV23) 07/04/2000 ---    Influenza Vaccine 08/01/2018 ---    Hemoglobin A1c 02/02/2019 11/2/2018    Override on 5/17/2016: Done (8.4  dr wise)    Eye Exam 10/16/2019 10/16/2018    Foot Exam 12/05/2019 12/5/2018    Lipid Panel 12/05/2019 12/5/2018            ASSESSMENT     38 y.o. male with     1. Allergic sinusitis    2. Type 2 diabetes mellitus without complication, with long-term current use of insulin        PLAN:     1. Allergic sinusitis  - Continue symptomatic treatment with rest, increase fluid intake, tylenol or ibuprofen PRN fever(temp >/= 100.4) or body aches. Okay to take OTC antihistamines, i.e. Bendaryl, Claritin, Allegra, etc. as needed.  - Okay to gargle with warm, salt water or use throat lozenges as needed  - azelastine (ASTELIN) 137 mcg (0.1 %) nasal spray; 1 spray (137 mcg total) by Nasal route 2 (two) times daily.  Dispense: 30 mL; Refill: 3    2. Type 2 diabetes mellitus without complication, with long-term current use of insulin  - Stable; no acute issues  - The current medical regimen is  effective;  continue present plan and medications.  - Pt advised to return for previously ordered HgbA1C and urine micro/cr ratio        RTC in 1-2 weeks as needed for any acute worsening of current condition or failure to improve       Izabela Currie MD  04/22/2019 1:55 PM        No follow-ups on file.

## 2019-05-07 DIAGNOSIS — E11.9 TYPE 2 DIABETES MELLITUS WITHOUT COMPLICATION, WITH LONG-TERM CURRENT USE OF INSULIN: ICD-10-CM

## 2019-05-07 DIAGNOSIS — Z79.4 TYPE 2 DIABETES MELLITUS WITHOUT COMPLICATION, WITH LONG-TERM CURRENT USE OF INSULIN: ICD-10-CM

## 2019-05-07 RX ORDER — INSULIN GLARGINE 100 [IU]/ML
INJECTION, SOLUTION SUBCUTANEOUS
Qty: 15 SYRINGE | Refills: 1 | OUTPATIENT
Start: 2019-05-07

## 2019-05-11 DIAGNOSIS — E78.2 MIXED HYPERLIPIDEMIA: ICD-10-CM

## 2019-05-11 RX ORDER — ATORVASTATIN CALCIUM 40 MG/1
TABLET, FILM COATED ORAL
Qty: 90 TABLET | Refills: 1 | Status: SHIPPED | OUTPATIENT
Start: 2019-05-11 | End: 2019-07-19 | Stop reason: SDUPTHER

## 2019-05-15 ENCOUNTER — TELEPHONE (OUTPATIENT)
Dept: FAMILY MEDICINE | Facility: CLINIC | Age: 39
End: 2019-05-15

## 2019-05-15 NOTE — TELEPHONE ENCOUNTER
Patient would like a new RX for BD UF MATA PEN NEEDLE [id number] called into his listed Pharmacy ([facility]). I didn't see the medication under the meds .

## 2019-05-18 RX ORDER — PEN NEEDLE, DIABETIC 30 GX3/16"
NEEDLE, DISPOSABLE MISCELLANEOUS
Qty: 30 EACH | Refills: 12 | Status: SHIPPED | OUTPATIENT
Start: 2019-05-18 | End: 2021-06-16 | Stop reason: SDUPTHER

## 2019-06-25 ENCOUNTER — LAB VISIT (OUTPATIENT)
Dept: LAB | Facility: HOSPITAL | Age: 39
End: 2019-06-25
Attending: PODIATRIST
Payer: COMMERCIAL

## 2019-06-25 ENCOUNTER — OFFICE VISIT (OUTPATIENT)
Dept: PODIATRY | Facility: CLINIC | Age: 39
End: 2019-06-25
Payer: COMMERCIAL

## 2019-06-25 VITALS
SYSTOLIC BLOOD PRESSURE: 116 MMHG | DIASTOLIC BLOOD PRESSURE: 82 MMHG | HEIGHT: 70 IN | BODY MASS INDEX: 25.05 KG/M2 | WEIGHT: 175 LBS

## 2019-06-25 DIAGNOSIS — M20.5X1 HALLUX LIMITUS, ACQUIRED, RIGHT: ICD-10-CM

## 2019-06-25 DIAGNOSIS — M20.42 HAMMER TOES OF BOTH FEET: ICD-10-CM

## 2019-06-25 DIAGNOSIS — B35.1 ONYCHOMYCOSIS DUE TO DERMATOPHYTE: ICD-10-CM

## 2019-06-25 DIAGNOSIS — M20.5X2 HALLUX LIMITUS, ACQUIRED, LEFT: ICD-10-CM

## 2019-06-25 DIAGNOSIS — M20.41 HAMMER TOES OF BOTH FEET: ICD-10-CM

## 2019-06-25 DIAGNOSIS — E10.9 COMPREHENSIVE DIABETIC FOOT EXAMINATION, TYPE 1 DM, ENCOUNTER FOR: Primary | ICD-10-CM

## 2019-06-25 LAB
ALBUMIN SERPL BCP-MCNC: 3.9 G/DL (ref 3.5–5.2)
ALP SERPL-CCNC: 151 U/L (ref 55–135)
ALT SERPL W/O P-5'-P-CCNC: 52 U/L (ref 10–44)
AST SERPL-CCNC: 27 U/L (ref 10–40)
BILIRUB DIRECT SERPL-MCNC: 0.4 MG/DL (ref 0.1–0.3)
BILIRUB SERPL-MCNC: 0.7 MG/DL (ref 0.1–1)
PROT SERPL-MCNC: 6.5 G/DL (ref 6–8.4)

## 2019-06-25 PROCEDURE — 3046F HEMOGLOBIN A1C LEVEL >9.0%: CPT | Mod: CPTII,S$GLB,, | Performed by: PODIATRIST

## 2019-06-25 PROCEDURE — 3046F PR MOST RECENT HEMOGLOBIN A1C LEVEL > 9.0%: ICD-10-PCS | Mod: CPTII,S$GLB,, | Performed by: PODIATRIST

## 2019-06-25 PROCEDURE — 99999 PR PBB SHADOW E&M-EST. PATIENT-LVL IV: CPT | Mod: PBBFAC,,, | Performed by: PODIATRIST

## 2019-06-25 PROCEDURE — 36415 COLL VENOUS BLD VENIPUNCTURE: CPT | Mod: PO

## 2019-06-25 PROCEDURE — 99999 PR PBB SHADOW E&M-EST. PATIENT-LVL IV: ICD-10-PCS | Mod: PBBFAC,,, | Performed by: PODIATRIST

## 2019-06-25 PROCEDURE — 3008F PR BODY MASS INDEX (BMI) DOCUMENTED: ICD-10-PCS | Mod: CPTII,S$GLB,, | Performed by: PODIATRIST

## 2019-06-25 PROCEDURE — 3008F BODY MASS INDEX DOCD: CPT | Mod: CPTII,S$GLB,, | Performed by: PODIATRIST

## 2019-06-25 PROCEDURE — 99214 OFFICE O/P EST MOD 30 MIN: CPT | Mod: S$GLB,,, | Performed by: PODIATRIST

## 2019-06-25 PROCEDURE — 99214 PR OFFICE/OUTPT VISIT, EST, LEVL IV, 30-39 MIN: ICD-10-PCS | Mod: S$GLB,,, | Performed by: PODIATRIST

## 2019-06-25 PROCEDURE — 80076 HEPATIC FUNCTION PANEL: CPT

## 2019-06-25 NOTE — PROGRESS NOTES
Subjective:      Patient ID: Jya Meyer is a 38 y.o. male.    Chief Complaint: Foot Problem (toenail fungus PCp Dr. Medellin 9/6/17 seen Dr. Currie 4/22/19); Nail Problem; Diabetes Mellitus; Diabetic Foot Exam; and Nail Care    Jay is a 38 y.o. male who presents to the clinic for evaluation and treatment of high risk feet. Jay has a past medical history of Diabetes mellitus type I. The patient's chief complaint is discolored, brittle, thick toenails. This patient has documented high risk feet requiring routine maintenance secondary to diabetes mellitis and those secondary complications of diabetes, as mentioned..    PCP: Hoda Medellin MD    Date Last Seen by PCP:   Chief Complaint   Patient presents with    Foot Problem     toenail fungus PCp Dr. Medellin 9/6/17 seen Dr. Currie 4/22/19    Nail Problem    Diabetes Mellitus    Diabetic Foot Exam    Nail Care       Current shoe gear:   Casual shoes    Hemoglobin A1C   Date Value Ref Range Status   11/02/2018 10.2 (H) 4.0 - 5.6 % Final     Comment:     ADA Screening Guidelines:  5.7-6.4%  Consistent with prediabetes  >or=6.5%  Consistent with diabetes  High levels of fetal hemoglobin interfere with the HbA1C  assay. Heterozygous hemoglobin variants (HbS, HgC, etc)do  not significantly interfere with this assay.   However, presence of multiple variants may affect accuracy.     04/10/2018 8.7 (H) 4.0 - 5.6 % Final     Comment:     According to ADA guidelines, hemoglobin A1c <7.0% represents  optimal control in non-pregnant diabetic patients. Different  metrics may apply to specific patient populations.   Standards of Medical Care in Diabetes-2016.  For the purpose of screening for the presence of diabetes:  <5.7%     Consistent with the absence of diabetes  5.7-6.4%  Consistent with increasing risk for diabetes   (prediabetes)  >or=6.5%  Consistent with diabetes  Currently, no consensus exists for use of hemoglobin A1c  for diagnosis of diabetes for  children.  This Hemoglobin A1c assay has significant interference with fetal   hemoglobin   (HbF). The results are invalid for patients with abnormal amounts of   HbF,   including those with known Hereditary Persistence   of Fetal Hemoglobin. Heterozygous hemoglobin variants (HbAS, HbAC,   HbAD, HbAE, HbA2) do not significantly interfere with this assay;   however, presence of multiple variants in a sample may impact the %   interference.             Patient Active Problem List   Diagnosis    Type 2 diabetes mellitus without complication, with long-term current use of insulin    Microalbuminuria due to type 2 diabetes mellitus       Current Outpatient Medications on File Prior to Visit   Medication Sig Dispense Refill    APIDRA 100 unit/mL injection       atorvastatin (LIPITOR) 40 MG tablet TAKE 1 TABLET BY MOUTH EVERY DAY 90 tablet 1    azelastine (ASTELIN) 137 mcg (0.1 %) nasal spray 1 spray (137 mcg total) by Nasal route 2 (two) times daily. 30 mL 3    BASAGLAR KWIKPEN U-100 INSULIN glargine 100 units/mL (3mL) SubQ pen INJECT 40 UNITS INTO THE SKIN EVERY EVENING. 15 Syringe 0    blood sugar diagnostic Strp To check BG 4 times daily, to use with insurance preferred meter 400 each 3    blood-glucose meter kit To check BG 4 times daily, to use with insurance preferred meter 1 each 0    ciclopirox (PENLAC) 8 % Soln Apply topically nightly. 6.6 mL 11    fluticasone (FLONASE) 50 mcg/actuation nasal spray 1 spray (50 mcg total) by Each Nare route once daily. 16 g 0    ibuprofen (ADVIL,MOTRIN) 800 MG tablet Take 1 tablet (800 mg total) by mouth 3 (three) times daily as needed for Pain (WITH MEALS). 60 tablet 0    insulin lispro (HUMALOG KWIKPEN INSULIN) 100 unit/mL InPn pen Take 12 units before breakfast, 15 units before lunch, and 15 units before dinner 15 mL 5    lancets Misc To check BG 4 times daily, to use with insurance preferred meter 400 each 3    levocetirizine (XYZAL) 5 MG tablet TAKE 1 TABLET BY  "MOUTH IN THE EVENING 30 tablet 5    lisinopril (PRINIVIL,ZESTRIL) 2.5 MG tablet Take 1 tablet (2.5 mg total) by mouth once daily. 90 tablet 1    pen needle, diabetic (BD ULTRA-FINE MATA PEN NEEDLE) 32 gauge x 5/32" Ndle INJECT SC DAILY 30 each 12     No current facility-administered medications on file prior to visit.        Review of patient's allergies indicates:  No Known Allergies    Past Surgical History:   Procedure Laterality Date    VASECTOMY         Family History   Problem Relation Age of Onset    Diabetes Father     Diabetes Brother     Vision loss Brother     Diabetes type II Paternal Grandfather     Heart attack Paternal Grandfather     Diabetes type II Brother        Social History     Socioeconomic History    Marital status:      Spouse name: Not on file    Number of children: Not on file    Years of education: Not on file    Highest education level: Not on file   Occupational History     Comment:    Social Needs    Financial resource strain: Not on file    Food insecurity:     Worry: Not on file     Inability: Not on file    Transportation needs:     Medical: Not on file     Non-medical: Not on file   Tobacco Use    Smoking status: Former Smoker     Last attempt to quit: 2010     Years since quittin.4    Smokeless tobacco: Current User     Types: Snuff   Substance and Sexual Activity    Alcohol use: Yes     Alcohol/week: 1.2 oz     Types: 2 Cans of beer per week    Drug use: Not on file    Sexual activity: Yes     Partners: Female   Lifestyle    Physical activity:     Days per week: Not on file     Minutes per session: Not on file    Stress: Not on file   Relationships    Social connections:     Talks on phone: Not on file     Gets together: Not on file     Attends Sikhism service: Not on file     Active member of club or organization: Not on file     Attends meetings of clubs or organizations: Not on file     Relationship status: Not on file " "  Other Topics Concern    Not on file   Social History Narrative    Not on file       Review of Systems   Constitution: Negative for chills and fever.   Cardiovascular: Negative for claudication and leg swelling.   Respiratory: Negative for cough and shortness of breath.    Skin: Positive for dry skin and nail changes. Negative for itching and rash.   Musculoskeletal: Positive for back pain, joint pain and myalgias. Negative for falls, joint swelling and muscle weakness.   Gastrointestinal: Negative for diarrhea, nausea and vomiting.   Neurological: Positive for numbness and paresthesias. Negative for tremors and weakness.   Psychiatric/Behavioral: Negative for altered mental status and hallucinations.           Objective:      Vitals:    06/25/19 0748   BP: 116/82   Weight: 79.4 kg (175 lb)   Height: 5' 10" (1.778 m)   PainSc: 0-No pain       Physical Exam   Constitutional: He appears well-developed and well-nourished.  Non-toxic appearance. He does not have a sickly appearance. No distress.   alert and oriented x 3.    Cardiovascular:   Pulses:       Dorsalis pedis pulses are 2+ on the right side, and 2+ on the left side.        Posterior tibial pulses are 2+ on the right side, and 2+ on the left side.    Capillary refill time is within normal limits. Digital hair present.    Pulmonary/Chest: No respiratory distress.   Musculoskeletal: He exhibits no deformity.        Right ankle: No tenderness. No lateral malleolus, no medial malleolus, no AITFL, no CF ligament and no posterior TFL tenderness found. Achilles tendon exhibits no pain, no defect and normal Downs's test results.        Left ankle: No tenderness. No lateral malleolus, no medial malleolus, no AITFL, no CF ligament and no posterior TFL tenderness found. Achilles tendon exhibits no pain, no defect and normal Downs's test results.        Right foot: There is no tenderness and no bony tenderness.        Left foot: There is no tenderness and no bony " tenderness.   Adequate joint range of motion without pain, limitation, nor crepitation Bilateral feet and ankle joints. Muscle strength is 5/5 in all groups bilaterally.           Feet:   Right Foot:   Protective Sensation: 5 sites tested. 5 sites sensed.   Left Foot:   Protective Sensation: 5 sites tested. 5 sites sensed.   Lymphadenopathy:   No lymphatic streaking     Neurological: He displays no atrophy. No sensory deficit.   Light touch present     Skin: Skin is warm, dry and intact. No abrasion, no bruising, no lesion and no rash noted. He is not diaphoretic. No cyanosis or erythema. No pallor. Nails show no clubbing.   Skin is of normal turgor.   Normal temperature gradient.  Examination of the skin reveals no evidence of significant rashes, open lesions, suspicious appearing nevi or other concerning lesions.     Toenails 1-5 bilaterally are elongated by 2-3 mm, thickened by 2-3 mm, discolored/tan dystrophic, brittle with subungual debris.   Psychiatric: His mood appears not anxious. His affect is not inappropriate. His speech is not slurred. He is not combative. He is communicative. He is attentive.   Nursing note and vitals reviewed.            Assessment:       Encounter Diagnoses   Name Primary?    Comprehensive diabetic foot examination, type 1 DM, encounter for Yes    Onychomycosis due to dermatophyte     Hammer toes of both feet     Hallux limitus, acquired, right     Hallux limitus, acquired, left          Plan:     Problem List Items Addressed This Visit     None      Visit Diagnoses     Comprehensive diabetic foot examination, type 1 DM, encounter for    -  Primary    Onychomycosis due to dermatophyte        Relevant Orders    HEPATIC FUNCTION PANEL (Completed)    Hammer toes of both feet        Hallux limitus, acquired, right        Hallux limitus, acquired, left             I counseled the patient on his conditions, their implications and medical management.    Orders Placed This Encounter     HEPATIC FUNCTION PANEL       Greater than 50% of this visit spent on counseling and coordination of care.    Education about the diabetic foot, neuropathy, and prevention of limb loss.    Shoe inspection. Diabetic Foot Education. Patient reminded of the importance of good nutrition/healthy diet/weight management and blood sugar control to help prevent podiatric complications of diabetes. Patient instructed on proper foot hygeine. Wear comfortable, proper fitting shoes. Wash feet daily. Dry well. After drying, apply moisturizer to feet (no lotion to webspaces). Inspect feet daily for skin breaks, blisters, swelling, or redness. Wear cotton socks (preferably white)  Change socks every day. Do NOT walk barefoot. Do NOT use heating pads or hot water soaks. We discussed wearing proper shoe gear, daily foot inspections, never walking without protective shoe gear.     Discussed edema control and the importance of daily moisturizer to the feet such as Gold bonds diabetic foot cream    Recommend applying vicks vaporub to thick abnormal toenails daily x 6 months to treat fungal nail infection.    We discussed using Lamisil for onychomycosis. This drug offers a fairly high but not universal cure rate. A 12 week treatment course is recommended. The patient is aware that rare cases of liver injury have been reported; and agrees to come in for liver function tests prior to rx and again at ~5 weeks of treatment. The symptoms of liver disease have been discussed; call if such occurs. In addition, some insurance plans do not cover the expense of this drug for treating a cosmetic condition, and the patient understands they may have to pay for the medication. Other side effects, such as headaches and rashes, have also been discussed.    Instructed patient on the importance of keeping feet dry. Patient instructed to use absorbent cotton socks and change them if they become sweaty; or wear an open-toe shoe or sandal. Wash the feet at  least once a day with soap and water. Patient instructed to use lysol or over-the-counter antifungal powders or sprays to shoes daily and allow them to air dry, switching shoes from every other day would be optimal. Patient is to avoid barefoot walking in high-risk environments (public showers, gyms and locker rooms) may prevent future infections.     Patient to RTC if:  Increasing redness or swelling of the foot   Pus draining from cracks in the skin   Fever of 100.4ºF (38ºC) or higher    He will continue to monitor the areas daily, inspect his feet, wear protective shoe gear when ambulatory, moisturizer to maintain skin integrity and follow in this office in approximately 12 months, sooner p.r.n.

## 2019-06-25 NOTE — PATIENT INSTRUCTIONS
Recommend lotions: eucerin, eucerin for diabetics, aquaphor, A&D ointment, gold bond for diabetics, sween, [place]'s Bees all purpose baby ointment,  urea 40 with aloe (found on amazon.com)    Shoe recommendations: (try 6pm.com, zappos.[employer] , nordstromrack.[employer], or shoes.[employer] for discounted prices) you can visit [employer] in [place]  or [facility] in the [facility] (there are also several shoe brand outlets in the [facility])    Asics (GT 2000 or gel foundations), new balance stability type shoes (such as the 940 series), saucony (stabil c3),  Govea (GTS or Beast or transcend), propet (tennis shoe)    Sofft Brand (women) Elle&Lex (men), clarks, crocs, aerosoles, naturalizers, SAS, ecco, born, samantha rae, rockports (dress shoes)    Vionic, burkenstocks, fitflops, propet (sandals)  Nike comfort thong sandals, crocs, propet (house shoes)    Nail Home remedy:  Vicks Vapor rub to nails for easier manageability        Athletes Foot     Athletes Foot is caused by a fungal infection in the skin. It affects the skin between the toes where it causes fissures (cracks in the skin). It can also affect the bottom of the foot where it causes dry white scales and peeling of the skin. This infection is more likely to occur when the foot is in hot, sweaty socks and shoes for long periods of time.   This infection is treated with skin creams or oral medicine.     Home Care:   It is important to keep the feet dry. Use absorbent cotton socks and change them if they become sweaty; or wear an open-toe shoe or sandal. Wash the feet at least once a day with soap and water.   Rotate your shoes. If you must wear the same shoes everyday then spray the shoes with lysol or antifungal spray and allow that to dry overnight before wearing the shoes again  Apply the antifungal cream as prescribed. Some antifungal creams are available without a prescription (Lotrimin, Tinactin).   It may take a week before the rash starts to improve and it can  take about three to four weeks to completely clear. Continue the medicine until the rash is all gone.   Use over-the-counter antifungal powders or sprays on your feet after exposure to high-risk environments (public showers, gyms and locker rooms) may prevent future infections. You may wish to use appropriate footwear to reduce exposure.  Clean tubs and bathroom floor with bleach  Clean feet with Nizoral shampoo or dial antibacterial soap and then dry completely.    Follow Up   with your doctor as recommended by our staff if the rash is not starting to improve after TEN days of treatment, or if the rash continues to spread.     Get Prompt Medical Attention   if any of the following occur:   Increasing redness or swelling of the foot   Pus draining from cracks in the skin   Fever of 100.4ºF (38ºC) or higher, or as directed by your healthcare provider    © 3522-6449 [facility], [address] [place] [address]. All rights reserved. This information is not intended as a substitute for professional medical care. Always follow your healthcare professional's instructions.                 Terbinafine tablets  What is this medicine?  TERBINAFINE (TER bin a feen) is an antifungal medicine. It is used to treat certain kinds of fungal or yeast infections.  How should I use this medicine?  Take this medicine by mouth with a full glass of water. Follow the directions on the prescription label. You can take this medicine with food or on an empty stomach. Take your medicine at regular intervals. Do not take your medicine more often than directed. Do not skip doses or stop your medicine early even if you feel better. Do not stop taking except on your doctor's advice. Talk to your pediatrician regarding the use of this medicine in children. Special care may be needed.  What side effects may I notice from receiving this medicine?  Side effects that you should report to your doctor or health care professional as soon as  possible:  · allergic reactions like skin rash or hives, swelling of the face, lips, or tongue  · changes in vision  · dark urine  · fever or infection  · general ill feeling or flu-like symptoms  · light-colored stools  · loss of appetite, nausea  · redness, blistering, peeling or loosening of the skin, including inside the mouth  · right upper belly pain  · unusually weak or tired  · yellowing of the eyes or skin  Side effects that usually do not require medical attention (report to your doctor or health care professional if they continue or are bothersome):  · changes in taste  · diarrhea  · hair loss  · muscle or joint pain  · stomach gas  · stomach upset  What may interact with this medicine?  Do not take this medicine with any of the following medications:  · thioridazine  This medicine may also interact with the following medications:  · beta-blockers  · caffeine  · cimetidine  · cyclosporine  · medicines for depression, anxiety, or psychotic disturbances  · medicines for fungal infections like fluconazole and ketoconazole  · medicines for irregular heartbeat like amiodarone, flecainide and propafenone  · rifampin  · warfarin  What if I miss a dose?  If you miss a dose, take it as soon as you can. If it is almost time for your next dose, take only that dose. Do not take double or extra doses.  Where should I keep my medicine?  Keep out of the reach of children.  Store at room temperature below 25 degrees C (77 degrees F). Protect from light. Throw away any unused medicine after the expiration date.  What should I tell my health care provider before I take this medicine?  They need to know if you have any of these conditions:  · drink alcoholic beverages  · kidney disease  · liver disease  · an unusual or allergic reaction to terbinafine, other medicines, foods, dyes, or preservatives  · pregnant or trying to get pregnant  · breast-feeding  What should I watch for while using this medicine?  Visit your doctor  or health care provider regularly. Tell your doctor right away if you have nausea or vomiting, loss of appetite, stomach pain on your right upper side, yellow skin, dark urine, light stools, or are over tired. Some fungal infections need many weeks or months of treatment to cure. If you are taking this medicine for a long time, you will need to have important blood work done.  NOTE:This sheet is a summary. It may not cover all possible information. If you have questions about this medicine, talk to your doctor, pharmacist, or health care provider. Copyright© 2017 [employer]        Diabetes: Inspecting Your Feet  Diabetes increases your chances of developing foot problems. So inspect your feet every day. This helps you find small skin irritations before they become serious infections. If you have trouble seeing the bottoms of your feet, use a mirror or ask a family member or friend to help.     Pressure spots on the bottom of the foot are common areas where problems develop.   How to check your feet  Below are tips to help you look for foot problems. Try to check your feet at the same time each day, such as when you get out of bed in the morning:  · Check the top of each foot. The tops of toes, back of the heel, and outer edge of the foot can get a lot of rubbing from poor-fitting shoes.  · Check the bottom of each foot. Daily wear and tear often leads to problems at pressure spots.  · Check the toes and nails. Fungal infections often occur between toes. Toenail problems can also be a sign of fungal infections or lead to breaks in the skin.  · Check your shoes, too. Loose objects inside a shoe can injure the foot. Use your hand to feel inside your shoes for things like adrián, loose stitching, or rough areas that could irritate your skin.  Warning signs  Look for any color changes in the foot. Redness with streaks can signal a severe infection, which needs immediate medical attention. Tell your doctor right away  if you have any of these problems:  · Swelling, sometimes with color changes, may be a sign of poor blood flow or infection. Symptoms include tenderness and an increase in the size of your foot.  · Warm or hot areas on your feet may be signs of infection. A foot that is cold may not be getting enough blood.  · Sensations such as burning, tingling, or pins and needles can be signs of a problem. Also check for areas that may be numb.  · Hot spots are caused by friction or pressure. Look for hot spots in areas that get a lot of rubbing. Hot spots can turn into blisters, calluses, or sores.  · Cracks and sores are caused by dry or irritated skin. They are a sign that the skin is breaking down, which can lead to infection.  · Toenail problems to watch for include nails growing into the skin (ingrown toenail) and causing redness or pain. Thick, yellow, or discolored nails can signal a fungal infection.  · Drainage and odor can develop from untreated sores and ulcers. Call your doctor right away if you notice white or yellow drainage, bleeding, or unpleasant odor.   © 2408-2922 [employer]. [address] [place] [address]. All rights reserved. This information is not intended as a substitute for professional medical care. Always follow your healthcare professional's instructions.        Step-by-Step:  Inspecting Your Feet (Diabetes)    Date Last Reviewed: 10/1/2016  © 6871-6499 [employer]. [address] [place] [address]. All rights reserved. This information is not intended as a substitute for professional medical care. Always follow your healthcare professional's instructions.

## 2019-06-26 ENCOUNTER — TELEPHONE (OUTPATIENT)
Dept: PODIATRY | Facility: CLINIC | Age: 39
End: 2019-06-26

## 2019-06-26 NOTE — TELEPHONE ENCOUNTER
----- Message from Danielle Sosa DPM sent at 6/26/2019  9:03 AM CDT -----  Please inform patient that due to elevated liver function numbers we cannot initiate Lamisil.  He would need to discuss the risk and benefit with his primary care doctor.  In the meantime offer him the foot soaks, if he is amenable to soaks please send off the pre signed healthLogic form on his behalf.

## 2019-06-28 DIAGNOSIS — E11.9 TYPE 2 DIABETES MELLITUS WITHOUT COMPLICATION, WITH LONG-TERM CURRENT USE OF INSULIN: ICD-10-CM

## 2019-06-28 DIAGNOSIS — Z79.4 TYPE 2 DIABETES MELLITUS WITHOUT COMPLICATION, WITH LONG-TERM CURRENT USE OF INSULIN: ICD-10-CM

## 2019-06-28 RX ORDER — INSULIN GLARGINE 100 [IU]/ML
INJECTION, SOLUTION SUBCUTANEOUS
Qty: 15 SYRINGE | Refills: 0 | OUTPATIENT
Start: 2019-06-28

## 2019-07-01 DIAGNOSIS — E11.9 TYPE 2 DIABETES MELLITUS WITHOUT COMPLICATION, WITH LONG-TERM CURRENT USE OF INSULIN: ICD-10-CM

## 2019-07-01 DIAGNOSIS — Z79.4 TYPE 2 DIABETES MELLITUS WITHOUT COMPLICATION, WITH LONG-TERM CURRENT USE OF INSULIN: ICD-10-CM

## 2019-07-01 RX ORDER — INSULIN GLARGINE 100 [IU]/ML
INJECTION, SOLUTION SUBCUTANEOUS
Qty: 15 SYRINGE | Refills: 0 | OUTPATIENT
Start: 2019-07-01

## 2019-07-10 DIAGNOSIS — Z79.4 TYPE 2 DIABETES MELLITUS WITHOUT COMPLICATION, WITH LONG-TERM CURRENT USE OF INSULIN: ICD-10-CM

## 2019-07-10 DIAGNOSIS — E11.9 TYPE 2 DIABETES MELLITUS WITHOUT COMPLICATION, WITH LONG-TERM CURRENT USE OF INSULIN: ICD-10-CM

## 2019-07-10 RX ORDER — INSULIN GLARGINE 100 [IU]/ML
INJECTION, SOLUTION SUBCUTANEOUS
Qty: 15 SYRINGE | Refills: 0 | OUTPATIENT
Start: 2019-07-10

## 2019-07-11 ENCOUNTER — TELEPHONE (OUTPATIENT)
Dept: FAMILY MEDICINE | Facility: CLINIC | Age: 39
End: 2019-07-11

## 2019-07-11 DIAGNOSIS — E11.9 TYPE 2 DIABETES MELLITUS WITHOUT COMPLICATION, WITHOUT LONG-TERM CURRENT USE OF INSULIN: ICD-10-CM

## 2019-07-11 DIAGNOSIS — E11.9 TYPE 2 DIABETES MELLITUS WITHOUT COMPLICATION, WITH LONG-TERM CURRENT USE OF INSULIN: ICD-10-CM

## 2019-07-11 DIAGNOSIS — Z79.4 TYPE 2 DIABETES MELLITUS WITHOUT COMPLICATION, WITH LONG-TERM CURRENT USE OF INSULIN: ICD-10-CM

## 2019-07-11 NOTE — TELEPHONE ENCOUNTER
----- Message from Marleni Low sent at 7/11/2019  1:23 PM CDT -----  Contact: Self   Type: Patient Call Back    What is the request in detail: Pt calling to speak to a nurse regarding status on his insulin.     Can the clinic reply by [facility]? No     Would the patient rather a call back or a response via [facility]? Call back    Best call back number: [phone]

## 2019-07-11 NOTE — TELEPHONE ENCOUNTER
Pt is due for an appointment. He needs to be seen every 3 months. He also needs labs that have been ordered since 12/2018. He has not had an A1c since 8 months ago. Will only provide pt for a weekend supply if he is completely out if he can not come in tomorrow.

## 2019-07-12 RX ORDER — INSULIN LISPRO 100 [IU]/ML
INJECTION, SOLUTION INTRAVENOUS; SUBCUTANEOUS
Qty: 3 ML | Refills: 1 | Status: SHIPPED | OUTPATIENT
Start: 2019-07-12 | End: 2019-07-19 | Stop reason: SDUPTHER

## 2019-07-12 RX ORDER — INSULIN GLARGINE 100 [IU]/ML
INJECTION, SOLUTION SUBCUTANEOUS
Qty: 3 ML | Refills: 1 | Status: SHIPPED | OUTPATIENT
Start: 2019-07-12 | End: 2019-07-19 | Stop reason: SDUPTHER

## 2019-07-19 ENCOUNTER — LAB VISIT (OUTPATIENT)
Dept: LAB | Facility: HOSPITAL | Age: 39
End: 2019-07-19
Payer: COMMERCIAL

## 2019-07-19 ENCOUNTER — OFFICE VISIT (OUTPATIENT)
Dept: FAMILY MEDICINE | Facility: CLINIC | Age: 39
End: 2019-07-19
Payer: COMMERCIAL

## 2019-07-19 VITALS
OXYGEN SATURATION: 97 % | WEIGHT: 170.88 LBS | DIASTOLIC BLOOD PRESSURE: 76 MMHG | SYSTOLIC BLOOD PRESSURE: 118 MMHG | BODY MASS INDEX: 24.46 KG/M2 | HEIGHT: 70 IN | HEART RATE: 75 BPM | TEMPERATURE: 99 F

## 2019-07-19 DIAGNOSIS — R80.9 TYPE 1 DIABETES MELLITUS WITH MICROALBUMINURIA: Primary | ICD-10-CM

## 2019-07-19 DIAGNOSIS — B35.1 ONYCHOMYCOSIS: ICD-10-CM

## 2019-07-19 DIAGNOSIS — R80.9 TYPE 1 DIABETES MELLITUS WITH MICROALBUMINURIA: ICD-10-CM

## 2019-07-19 DIAGNOSIS — E10.29 TYPE 1 DIABETES MELLITUS WITH MICROALBUMINURIA: Primary | ICD-10-CM

## 2019-07-19 DIAGNOSIS — E78.2 MIXED HYPERLIPIDEMIA: ICD-10-CM

## 2019-07-19 DIAGNOSIS — R74.01 ELEVATED TRANSAMINASE LEVEL: ICD-10-CM

## 2019-07-19 DIAGNOSIS — R74.8 ELEVATED ALKALINE PHOSPHATASE LEVEL: ICD-10-CM

## 2019-07-19 DIAGNOSIS — E10.29 TYPE 1 DIABETES MELLITUS WITH MICROALBUMINURIA: ICD-10-CM

## 2019-07-19 PROBLEM — E11.29 MICROALBUMINURIA DUE TO TYPE 2 DIABETES MELLITUS: Status: RESOLVED | Noted: 2018-04-11 | Resolved: 2019-07-19

## 2019-07-19 LAB
ALBUMIN SERPL BCP-MCNC: 4.4 G/DL (ref 3.5–5.2)
ALBUMIN/CREAT UR: 9.2 UG/MG (ref 0–30)
ALP SERPL-CCNC: 149 U/L (ref 55–135)
ALT SERPL W/O P-5'-P-CCNC: 71 U/L (ref 10–44)
ANION GAP SERPL CALC-SCNC: 6 MMOL/L (ref 8–16)
APAP SERPL-MCNC: <3 UG/ML (ref 10–20)
AST SERPL-CCNC: 35 U/L (ref 10–40)
BASOPHILS # BLD AUTO: 0.03 K/UL (ref 0–0.2)
BASOPHILS NFR BLD: 0.6 % (ref 0–1.9)
BILIRUB SERPL-MCNC: 1.1 MG/DL (ref 0.1–1)
BUN SERPL-MCNC: 9 MG/DL (ref 6–20)
CALCIUM SERPL-MCNC: 9.1 MG/DL (ref 8.7–10.5)
CHLORIDE SERPL-SCNC: 105 MMOL/L (ref 95–110)
CO2 SERPL-SCNC: 30 MMOL/L (ref 23–29)
CREAT SERPL-MCNC: 0.8 MG/DL (ref 0.5–1.4)
CREAT UR-MCNC: 196 MG/DL (ref 23–375)
DIFFERENTIAL METHOD: NORMAL
EOSINOPHIL # BLD AUTO: 0.1 K/UL (ref 0–0.5)
EOSINOPHIL NFR BLD: 3 % (ref 0–8)
ERYTHROCYTE [DISTWIDTH] IN BLOOD BY AUTOMATED COUNT: 13 % (ref 11.5–14.5)
EST. GFR  (AFRICAN AMERICAN): >60 ML/MIN/1.73 M^2
EST. GFR  (NON AFRICAN AMERICAN): >60 ML/MIN/1.73 M^2
ESTIMATED AVG GLUCOSE: 235 MG/DL (ref 68–131)
GGT SERPL-CCNC: 21 U/L (ref 8–55)
GLUCOSE SERPL-MCNC: 219 MG/DL (ref 70–110)
HBA1C MFR BLD HPLC: 9.8 % (ref 4–5.6)
HCT VFR BLD AUTO: 49.7 % (ref 40–54)
HGB BLD-MCNC: 16.3 G/DL (ref 14–18)
INR PPP: 1 (ref 0.8–1.2)
LYMPHOCYTES # BLD AUTO: 1.2 K/UL (ref 1–4.8)
LYMPHOCYTES NFR BLD: 25.5 % (ref 18–48)
MCH RBC QN AUTO: 30.4 PG (ref 27–31)
MCHC RBC AUTO-ENTMCNC: 32.8 G/DL (ref 32–36)
MCV RBC AUTO: 93 FL (ref 82–98)
MICROALBUMIN UR DL<=1MG/L-MCNC: 18 UG/ML
MONOCYTES # BLD AUTO: 0.4 K/UL (ref 0.3–1)
MONOCYTES NFR BLD: 7.5 % (ref 4–15)
NEUTROPHILS # BLD AUTO: 3 K/UL (ref 1.8–7.7)
NEUTROPHILS NFR BLD: 63.4 % (ref 38–73)
PLATELET # BLD AUTO: 199 K/UL (ref 150–350)
PMV BLD AUTO: 12 FL (ref 9.2–12.9)
POTASSIUM SERPL-SCNC: 4.2 MMOL/L (ref 3.5–5.1)
PROT SERPL-MCNC: 7.2 G/DL (ref 6–8.4)
PROTHROMBIN TIME: 10.8 SEC (ref 9–12.5)
RBC # BLD AUTO: 5.36 M/UL (ref 4.6–6.2)
SODIUM SERPL-SCNC: 141 MMOL/L (ref 136–145)
TSH SERPL DL<=0.005 MIU/L-ACNC: 1.03 UIU/ML (ref 0.4–4)
WBC # BLD AUTO: 4.66 K/UL (ref 3.9–12.7)

## 2019-07-19 PROCEDURE — 3008F PR BODY MASS INDEX (BMI) DOCUMENTED: ICD-10-PCS | Mod: CPTII,S$GLB,, | Performed by: INTERNAL MEDICINE

## 2019-07-19 PROCEDURE — 99999 PR PBB SHADOW E&M-EST. PATIENT-LVL III: ICD-10-PCS | Mod: PBBFAC,,, | Performed by: INTERNAL MEDICINE

## 2019-07-19 PROCEDURE — 99214 OFFICE O/P EST MOD 30 MIN: CPT | Mod: S$GLB,,, | Performed by: INTERNAL MEDICINE

## 2019-07-19 PROCEDURE — 80074 ACUTE HEPATITIS PANEL: CPT

## 2019-07-19 PROCEDURE — 83036 HEMOGLOBIN GLYCOSYLATED A1C: CPT

## 2019-07-19 PROCEDURE — 80053 COMPREHEN METABOLIC PANEL: CPT

## 2019-07-19 PROCEDURE — 82977 ASSAY OF GGT: CPT

## 2019-07-19 PROCEDURE — 3046F PR MOST RECENT HEMOGLOBIN A1C LEVEL > 9.0%: ICD-10-PCS | Mod: CPTII,S$GLB,, | Performed by: INTERNAL MEDICINE

## 2019-07-19 PROCEDURE — 85025 COMPLETE CBC W/AUTO DIFF WBC: CPT

## 2019-07-19 PROCEDURE — 99214 PR OFFICE/OUTPT VISIT, EST, LEVL IV, 30-39 MIN: ICD-10-PCS | Mod: S$GLB,,, | Performed by: INTERNAL MEDICINE

## 2019-07-19 PROCEDURE — 84443 ASSAY THYROID STIM HORMONE: CPT

## 2019-07-19 PROCEDURE — 3046F HEMOGLOBIN A1C LEVEL >9.0%: CPT | Mod: CPTII,S$GLB,, | Performed by: INTERNAL MEDICINE

## 2019-07-19 PROCEDURE — 3008F BODY MASS INDEX DOCD: CPT | Mod: CPTII,S$GLB,, | Performed by: INTERNAL MEDICINE

## 2019-07-19 PROCEDURE — 82043 UR ALBUMIN QUANTITATIVE: CPT

## 2019-07-19 PROCEDURE — 99999 PR PBB SHADOW E&M-EST. PATIENT-LVL III: CPT | Mod: PBBFAC,,, | Performed by: INTERNAL MEDICINE

## 2019-07-19 PROCEDURE — 80329 ANALGESICS NON-OPIOID 1 OR 2: CPT

## 2019-07-19 PROCEDURE — 85610 PROTHROMBIN TIME: CPT

## 2019-07-19 RX ORDER — INSULIN GLARGINE 100 [IU]/ML
INJECTION, SOLUTION SUBCUTANEOUS
Qty: 15 ML | Refills: 1 | Status: SHIPPED | OUTPATIENT
Start: 2019-07-19 | End: 2019-10-02 | Stop reason: SDUPTHER

## 2019-07-19 RX ORDER — LISINOPRIL 2.5 MG/1
2.5 TABLET ORAL DAILY
Qty: 90 TABLET | Refills: 1 | Status: SHIPPED | OUTPATIENT
Start: 2019-07-19 | End: 2020-01-14

## 2019-07-19 RX ORDER — INSULIN LISPRO 100 [IU]/ML
INJECTION, SOLUTION INTRAVENOUS; SUBCUTANEOUS
Qty: 15 ML | Refills: 1 | Status: SHIPPED | OUTPATIENT
Start: 2019-07-19 | End: 2019-10-18 | Stop reason: SDUPTHER

## 2019-07-19 RX ORDER — ATORVASTATIN CALCIUM 40 MG/1
40 TABLET, FILM COATED ORAL DAILY
Qty: 90 TABLET | Refills: 3 | Status: SHIPPED | OUTPATIENT
Start: 2019-07-19 | End: 2020-09-08

## 2019-07-19 NOTE — PROGRESS NOTES
SUBJECTIVE     Chief Complaint   Patient presents with    Diabetes    Follow-up       HPI  Jay Meyer is a 39 y.o. male with multiple medical diagnoses as listed in the medical history and problem list that presents for follow-up for DM2. He has been doing well since his last visit. He reports full compliance with meds and denies any adverse side effects. His fasting blood sugar levels range from , but is mostly at ~100. He denies any hypoglycemic episodes. He tries to comply with an ADA diet. Pt is without any other complaints today.      PAST MEDICAL HISTORY:  Past Medical History:   Diagnosis Date    Diabetes mellitus type I     since ; dx at 22       PAST SURGICAL HISTORY:  Past Surgical History:   Procedure Laterality Date    VASECTOMY         SOCIAL HISTORY:  Social History     Socioeconomic History    Marital status:      Spouse name: Not on file    Number of children: Not on file    Years of education: Not on file    Highest education level: Not on file   Occupational History     Comment:    Social Needs    Financial resource strain: Not on file    Food insecurity:     Worry: Not on file     Inability: Not on file    Transportation needs:     Medical: Not on file     Non-medical: Not on file   Tobacco Use    Smoking status: Former Smoker     Last attempt to quit: 2010     Years since quittin.4    Smokeless tobacco: Current User     Types: Snuff   Substance and Sexual Activity    Alcohol use: Yes     Alcohol/week: 1.2 oz     Types: 2 Cans of beer per week    Drug use: Not on file    Sexual activity: Yes     Partners: Female   Lifestyle    Physical activity:     Days per week: Not on file     Minutes per session: Not on file    Stress: Not on file   Relationships    Social connections:     Talks on phone: Not on file     Gets together: Not on file     Attends Quaker service: Not on file     Active member of club or organization: Not on file      Attends meetings of clubs or organizations: Not on file     Relationship status: Not on file   Other Topics Concern    Not on file   Social History Narrative    Not on file       FAMILY HISTORY:  Family History   Problem Relation Age of Onset    Diabetes Father     Diabetes Brother     Vision loss Brother     Diabetes type II Paternal Grandfather     Heart attack Paternal Grandfather     Diabetes type II Brother        ALLERGIES AND MEDICATIONS: updated and reviewed.  Review of patient's allergies indicates:  No Known Allergies  Current Outpatient Medications   Medication Sig Dispense Refill    atorvastatin (LIPITOR) 40 MG tablet Take 1 tablet (40 mg total) by mouth once daily. 90 tablet 3    insulin (BASAGLAR KWIKPEN U-100 INSULIN) glargine 100 units/mL (3mL) SubQ pen INJECT 40 UNITS INTO THE SKIN EVERY EVENING. 15 mL 1    insulin lispro (HUMALOG KWIKPEN INSULIN) 100 unit/mL pen Take 12 units before breakfast, 15 units before lunch, and 15 units before dinner 15 mL 1    lisinopril (PRINIVIL,ZESTRIL) 2.5 MG tablet Take 1 tablet (2.5 mg total) by mouth once daily. 90 tablet 1    azelastine (ASTELIN) 137 mcg (0.1 %) nasal spray 1 spray (137 mcg total) by Nasal route 2 (two) times daily. 30 mL 3    blood sugar diagnostic Strp To check BG 4 times daily, to use with insurance preferred meter 400 each 3    blood-glucose meter kit To check BG 4 times daily, to use with insurance preferred meter 1 each 0    ciclopirox (PENLAC) 8 % Soln Apply topically nightly. 6.6 mL 11    fluticasone (FLONASE) 50 mcg/actuation nasal spray 1 spray (50 mcg total) by Each Nare route once daily. 16 g 0    ibuprofen (ADVIL,MOTRIN) 800 MG tablet Take 1 tablet (800 mg total) by mouth 3 (three) times daily as needed for Pain (WITH MEALS). 60 tablet 0    lancets Misc To check BG 4 times daily, to use with insurance preferred meter 400 each 3    levocetirizine (XYZAL) 5 MG tablet TAKE 1 TABLET BY MOUTH IN THE EVENING 30  "tablet 5    pen needle, diabetic (BD ULTRA-FINE MATA PEN NEEDLE) 32 gauge x 5/32" Ndle INJECT SC DAILY 30 each 12     No current facility-administered medications for this visit.        ROS  Review of Systems   Constitutional: Negative for chills and fever.   HENT: Negative for hearing loss and sore throat.    Eyes: Negative for visual disturbance.   Respiratory: Negative for cough and shortness of breath.    Cardiovascular: Negative for chest pain, palpitations and leg swelling.   Gastrointestinal: Negative for abdominal pain, constipation, diarrhea, nausea and vomiting.   Genitourinary: Negative for dysuria, frequency and urgency.   Musculoskeletal: Negative for arthralgias, joint swelling and myalgias.   Skin: Negative for rash and wound.   Neurological: Negative for headaches.   Psychiatric/Behavioral: Negative for agitation and confusion. The patient is not nervous/anxious.          OBJECTIVE     Physical Exam  Vitals:    07/19/19 0747   BP: 118/76   Pulse: 75   Temp: 98.6 °F (37 °C)    Body mass index is 24.52 kg/m².  Weight: 77.5 kg (170 lb 13.7 oz)   Height: 5' 10" (177.8 cm)     Physical Exam   Constitutional: He is oriented to person, place, and time. He appears well-developed and well-nourished. No distress.   HENT:   Head: Normocephalic and atraumatic.   Right Ear: External ear normal.   Left Ear: External ear normal.   Nose: Nose normal.   Mouth/Throat: Oropharynx is clear and moist.   Eyes: Conjunctivae and EOM are normal. Right eye exhibits no discharge. Left eye exhibits no discharge. No scleral icterus.   Neck: Normal range of motion. Neck supple. No JVD present. No tracheal deviation present.   Cardiovascular: Normal rate, regular rhythm, normal heart sounds and intact distal pulses. Exam reveals no gallop and no friction rub.   No murmur heard.  Pulmonary/Chest: Effort normal and breath sounds normal. No respiratory distress. He has no wheezes.   Abdominal: Soft. Bowel sounds are normal. He " exhibits no distension and no mass. There is no tenderness. There is no rebound and no guarding.   Musculoskeletal: Normal range of motion. He exhibits no edema, tenderness or deformity.   Neurological: He is alert and oriented to person, place, and time. He exhibits normal muscle tone. Coordination normal.   Skin: Skin is warm and dry. No rash noted. No erythema.   Psychiatric: He has a normal mood and affect. His behavior is normal. Judgment and thought content normal.         Health Maintenance       Date Due Completion Date    TETANUS VACCINE 07/04/1999 ---    Pneumococcal Vaccine (Medium Risk) (1 of 1 - PPSV23) 07/04/2000 ---    Hemoglobin A1c 02/02/2019 11/2/2018    Override on 5/17/2016: Done (8.4  dr wise)    Influenza Vaccine 08/01/2019 ---    Eye Exam 10/16/2019 10/16/2018    Lipid Panel 12/05/2019 12/5/2018    Foot Exam 06/25/2020 6/25/2019 (Done)    Override on 6/25/2019: Done            ASSESSMENT     39 y.o. male with     1. Type 1 diabetes mellitus with microalbuminuria    2. Mixed hyperlipidemia    3. Elevated transaminase level    4. Elevated alkaline phosphatase level    5. Onychomycosis        PLAN:     1. Type 1 diabetes mellitus with microalbuminuria  - Stable; no acute issues  - The current medical regimen is effective;  continue present plan and medications.  - lisinopril (PRINIVIL,ZESTRIL) 2.5 MG tablet; Take 1 tablet (2.5 mg total) by mouth once daily.  Dispense: 90 tablet; Refill: 1  - insulin lispro (HUMALOG KWIKPEN INSULIN) 100 unit/mL pen; Take 12 units before breakfast, 15 units before lunch, and 15 units before dinner  Dispense: 15 mL; Refill: 1  - insulin (BASAGLAR KWIKPEN U-100 INSULIN) glargine 100 units/mL (3mL) SubQ pen; INJECT 40 UNITS INTO THE SKIN EVERY EVENING.  Dispense: 15 mL; Refill: 1  - CBC auto differential; Future  - Comprehensive metabolic panel; Future  - Hemoglobin A1c; Future  - TSH; Future  - Microalbumin/creatinine urine ratio    2. Mixed hyperlipidemia  - Stable;  no acute issues  - The current medical regimen is effective;  continue present plan and medications.  - atorvastatin (LIPITOR) 40 MG tablet; Take 1 tablet (40 mg total) by mouth once daily.  Dispense: 90 tablet; Refill: 3    3. Elevated transaminase level  - Hepatitis panel, acute; Future  - ACETAMINOPHEN LEVEL; Future  - Protime-INR; Future    4. Elevated alkaline phosphatase level  - Gamma GT; Future    5. Onychomycosis  - Pt looking to start oral meds per Podiatry after liver workup as above          RTC in 3 months     Izabela Currie MD  07/19/2019 8:13 AM        No follow-ups on file.

## 2019-07-22 LAB
HAV IGM SERPL QL IA: NEGATIVE
HBV CORE IGM SERPL QL IA: NEGATIVE
HBV SURFACE AG SERPL QL IA: NEGATIVE
HCV AB SERPL QL IA: NEGATIVE

## 2019-08-07 DIAGNOSIS — Z79.4 TYPE 2 DIABETES MELLITUS WITHOUT COMPLICATION, WITH LONG-TERM CURRENT USE OF INSULIN: ICD-10-CM

## 2019-08-07 DIAGNOSIS — E11.9 TYPE 2 DIABETES MELLITUS WITHOUT COMPLICATION, WITH LONG-TERM CURRENT USE OF INSULIN: ICD-10-CM

## 2019-08-07 RX ORDER — INSULIN GLARGINE 100 [IU]/ML
INJECTION, SOLUTION SUBCUTANEOUS
Qty: 12 SYRINGE | Refills: 5 | OUTPATIENT
Start: 2019-08-07

## 2019-10-02 DIAGNOSIS — R80.9 TYPE 1 DIABETES MELLITUS WITH MICROALBUMINURIA: ICD-10-CM

## 2019-10-02 DIAGNOSIS — E10.29 TYPE 1 DIABETES MELLITUS WITH MICROALBUMINURIA: ICD-10-CM

## 2019-10-02 RX ORDER — INSULIN GLARGINE 100 [IU]/ML
INJECTION, SOLUTION SUBCUTANEOUS
Qty: 15 ML | Refills: 1 | Status: SHIPPED | OUTPATIENT
Start: 2019-10-02 | End: 2019-11-08 | Stop reason: SDUPTHER

## 2019-10-04 ENCOUNTER — PATIENT OUTREACH (OUTPATIENT)
Dept: ADMINISTRATIVE | Facility: HOSPITAL | Age: 39
End: 2019-10-04

## 2019-10-18 ENCOUNTER — LAB VISIT (OUTPATIENT)
Dept: LAB | Facility: HOSPITAL | Age: 39
End: 2019-10-18
Payer: COMMERCIAL

## 2019-10-18 ENCOUNTER — OFFICE VISIT (OUTPATIENT)
Dept: FAMILY MEDICINE | Facility: CLINIC | Age: 39
End: 2019-10-18
Payer: COMMERCIAL

## 2019-10-18 VITALS
OXYGEN SATURATION: 97 % | BODY MASS INDEX: 24.84 KG/M2 | WEIGHT: 173.5 LBS | TEMPERATURE: 98 F | HEIGHT: 70 IN | HEART RATE: 77 BPM | DIASTOLIC BLOOD PRESSURE: 82 MMHG | SYSTOLIC BLOOD PRESSURE: 126 MMHG

## 2019-10-18 DIAGNOSIS — G89.29 CHRONIC RIGHT SHOULDER PAIN: ICD-10-CM

## 2019-10-18 DIAGNOSIS — E10.29 TYPE 1 DIABETES MELLITUS WITH MICROALBUMINURIA: ICD-10-CM

## 2019-10-18 DIAGNOSIS — R74.01 ELEVATED TRANSAMINASE LEVEL: ICD-10-CM

## 2019-10-18 DIAGNOSIS — R80.9 TYPE 1 DIABETES MELLITUS WITH MICROALBUMINURIA: ICD-10-CM

## 2019-10-18 DIAGNOSIS — R80.9 TYPE 1 DIABETES MELLITUS WITH MICROALBUMINURIA: Primary | ICD-10-CM

## 2019-10-18 DIAGNOSIS — E10.29 TYPE 1 DIABETES MELLITUS WITH MICROALBUMINURIA: Primary | ICD-10-CM

## 2019-10-18 DIAGNOSIS — M25.511 CHRONIC RIGHT SHOULDER PAIN: ICD-10-CM

## 2019-10-18 DIAGNOSIS — R74.8 ELEVATED ALKALINE PHOSPHATASE LEVEL: ICD-10-CM

## 2019-10-18 LAB
ALBUMIN SERPL BCP-MCNC: 4.5 G/DL (ref 3.5–5.2)
ALP SERPL-CCNC: 141 U/L (ref 55–135)
ALT SERPL W/O P-5'-P-CCNC: 75 U/L (ref 10–44)
ANION GAP SERPL CALC-SCNC: 7 MMOL/L (ref 8–16)
AST SERPL-CCNC: 29 U/L (ref 10–40)
BASOPHILS # BLD AUTO: 0.04 K/UL (ref 0–0.2)
BASOPHILS NFR BLD: 0.8 % (ref 0–1.9)
BILIRUB SERPL-MCNC: 1.1 MG/DL (ref 0.1–1)
BUN SERPL-MCNC: 11 MG/DL (ref 6–20)
CALCIUM SERPL-MCNC: 9.4 MG/DL (ref 8.7–10.5)
CHLORIDE SERPL-SCNC: 105 MMOL/L (ref 95–110)
CO2 SERPL-SCNC: 29 MMOL/L (ref 23–29)
CREAT SERPL-MCNC: 0.8 MG/DL (ref 0.5–1.4)
DIFFERENTIAL METHOD: NORMAL
EOSINOPHIL # BLD AUTO: 0.1 K/UL (ref 0–0.5)
EOSINOPHIL NFR BLD: 2.5 % (ref 0–8)
ERYTHROCYTE [DISTWIDTH] IN BLOOD BY AUTOMATED COUNT: 12.5 % (ref 11.5–14.5)
EST. GFR  (AFRICAN AMERICAN): >60 ML/MIN/1.73 M^2
EST. GFR  (NON AFRICAN AMERICAN): >60 ML/MIN/1.73 M^2
ESTIMATED AVG GLUCOSE: 220 MG/DL (ref 68–131)
GLUCOSE SERPL-MCNC: 227 MG/DL (ref 70–110)
HBA1C MFR BLD HPLC: 9.3 % (ref 4–5.6)
HCT VFR BLD AUTO: 47.7 % (ref 40–54)
HGB BLD-MCNC: 16.1 G/DL (ref 14–18)
IMM GRANULOCYTES # BLD AUTO: 0.01 K/UL (ref 0–0.04)
IMM GRANULOCYTES NFR BLD AUTO: 0.2 % (ref 0–0.5)
LYMPHOCYTES # BLD AUTO: 1.2 K/UL (ref 1–4.8)
LYMPHOCYTES NFR BLD: 25.7 % (ref 18–48)
MCH RBC QN AUTO: 30.6 PG (ref 27–31)
MCHC RBC AUTO-ENTMCNC: 33.8 G/DL (ref 32–36)
MCV RBC AUTO: 91 FL (ref 82–98)
MONOCYTES # BLD AUTO: 0.4 K/UL (ref 0.3–1)
MONOCYTES NFR BLD: 7.7 % (ref 4–15)
NEUTROPHILS # BLD AUTO: 3 K/UL (ref 1.8–7.7)
NEUTROPHILS NFR BLD: 63.1 % (ref 38–73)
NRBC BLD-RTO: 0 /100 WBC
PLATELET # BLD AUTO: 184 K/UL (ref 150–350)
PMV BLD AUTO: 11.9 FL (ref 9.2–12.9)
POTASSIUM SERPL-SCNC: 4.4 MMOL/L (ref 3.5–5.1)
PROT SERPL-MCNC: 6.9 G/DL (ref 6–8.4)
RBC # BLD AUTO: 5.27 M/UL (ref 4.6–6.2)
SODIUM SERPL-SCNC: 141 MMOL/L (ref 136–145)
WBC # BLD AUTO: 4.79 K/UL (ref 3.9–12.7)

## 2019-10-18 PROCEDURE — 99214 OFFICE O/P EST MOD 30 MIN: CPT | Mod: S$GLB,,, | Performed by: INTERNAL MEDICINE

## 2019-10-18 PROCEDURE — 3008F BODY MASS INDEX DOCD: CPT | Mod: CPTII,S$GLB,, | Performed by: INTERNAL MEDICINE

## 2019-10-18 PROCEDURE — 85025 COMPLETE CBC W/AUTO DIFF WBC: CPT

## 2019-10-18 PROCEDURE — 83036 HEMOGLOBIN GLYCOSYLATED A1C: CPT

## 2019-10-18 PROCEDURE — 80053 COMPREHEN METABOLIC PANEL: CPT

## 2019-10-18 PROCEDURE — 3008F PR BODY MASS INDEX (BMI) DOCUMENTED: ICD-10-PCS | Mod: CPTII,S$GLB,, | Performed by: INTERNAL MEDICINE

## 2019-10-18 PROCEDURE — 99214 PR OFFICE/OUTPT VISIT, EST, LEVL IV, 30-39 MIN: ICD-10-PCS | Mod: S$GLB,,, | Performed by: INTERNAL MEDICINE

## 2019-10-18 PROCEDURE — 99999 PR PBB SHADOW E&M-EST. PATIENT-LVL III: ICD-10-PCS | Mod: PBBFAC,,, | Performed by: INTERNAL MEDICINE

## 2019-10-18 PROCEDURE — 99999 PR PBB SHADOW E&M-EST. PATIENT-LVL III: CPT | Mod: PBBFAC,,, | Performed by: INTERNAL MEDICINE

## 2019-10-18 PROCEDURE — 3046F PR MOST RECENT HEMOGLOBIN A1C LEVEL > 9.0%: ICD-10-PCS | Mod: CPTII,S$GLB,, | Performed by: INTERNAL MEDICINE

## 2019-10-18 PROCEDURE — 3046F HEMOGLOBIN A1C LEVEL >9.0%: CPT | Mod: CPTII,S$GLB,, | Performed by: INTERNAL MEDICINE

## 2019-10-18 RX ORDER — INSULIN LISPRO 100 [IU]/ML
INJECTION, SOLUTION INTRAVENOUS; SUBCUTANEOUS
Qty: 15 ML | Refills: 1 | Status: SHIPPED | OUTPATIENT
Start: 2019-10-18 | End: 2020-03-17 | Stop reason: SDUPTHER

## 2019-10-18 NOTE — PROGRESS NOTES
SUBJECTIVE     Chief Complaint   Patient presents with    Follow-up       HPI  Jay Meyer is a 39 y.o. male with multiple medical diagnoses as listed in the medical history and problem list that presents for follow-up for DM2. He has been doing well since his last visit. He reports full compliance with meds and denies any adverse side effects. His fasting blood sugar levels range from 60s-357. He denies any hypoglycemic episodes. He is mostly compliant with an ADA diet as he has cut out starches and changed to wheat bread. Pt is without any other complaints today.      PAST MEDICAL HISTORY:  Past Medical History:   Diagnosis Date    Diabetes mellitus type I     since ; dx at 22       PAST SURGICAL HISTORY:  Past Surgical History:   Procedure Laterality Date    VASECTOMY         SOCIAL HISTORY:  Social History     Socioeconomic History    Marital status:      Spouse name: Not on file    Number of children: Not on file    Years of education: Not on file    Highest education level: Not on file   Occupational History     Comment:    Social Needs    Financial resource strain: Not on file    Food insecurity:     Worry: Not on file     Inability: Not on file    Transportation needs:     Medical: Not on file     Non-medical: Not on file   Tobacco Use    Smoking status: Former Smoker     Last attempt to quit: 2010     Years since quittin.7    Smokeless tobacco: Current User     Types: Snuff   Substance and Sexual Activity    Alcohol use: Yes     Alcohol/week: 2.0 standard drinks     Types: 2 Cans of beer per week    Drug use: Not on file    Sexual activity: Yes     Partners: Female   Lifestyle    Physical activity:     Days per week: Not on file     Minutes per session: Not on file    Stress: Very much   Relationships    Social connections:     Talks on phone: Not on file     Gets together: Not on file     Attends [employer] service: Not on file     Active member of  "club or organization: Not on file     Attends meetings of clubs or organizations: Not on file     Relationship status: Not on file   Other Topics Concern    Not on file   Social History Narrative    Not on file       FAMILY HISTORY:  Family History   Problem Relation Age of Onset    Diabetes Father     Diabetes Brother     Vision loss Brother     Diabetes type II Paternal Grandfather     Heart attack Paternal Grandfather     Diabetes type II Brother        ALLERGIES AND MEDICATIONS: updated and reviewed.  Review of patient's allergies indicates:  No Known Allergies  Current Outpatient Medications   Medication Sig Dispense Refill    atorvastatin (LIPITOR) 40 MG tablet Take 1 tablet (40 mg total) by mouth once daily. 90 tablet 3    azelastine (ASTELIN) 137 mcg (0.1 %) nasal spray 1 spray (137 mcg total) by Nasal route 2 (two) times daily. 30 mL 3    blood sugar diagnostic Strp To check BG 4 times daily, to use with insurance preferred meter 400 each 3    blood-glucose meter kit To check BG 4 times daily, to use with insurance preferred meter 1 each 0    fluticasone (FLONASE) 50 mcg/actuation nasal spray 1 spray (50 mcg total) by Each Nare route once daily. 16 g 0    insulin (BASAGLAR KWIKPEN U-100 INSULIN) glargine 100 units/mL (3mL) SubQ pen INJECT 40 UNITS INTO THE SKIN EVERY EVENING. 15 mL 1    insulin lispro (HUMALOG KWIKPEN INSULIN) 100 unit/mL pen Take 12 units before breakfast, 15 units before lunch, and 15 units before dinner 15 mL 1    lancets Misc To check BG 4 times daily, to use with insurance preferred meter 400 each 3    levocetirizine (XYZAL) 5 MG tablet TAKE 1 TABLET BY MOUTH IN THE EVENING 30 tablet 5    lisinopril (PRINIVIL,ZESTRIL) 2.5 MG tablet Take 1 tablet (2.5 mg total) by mouth once daily. 90 tablet 1    pen needle, diabetic (BD ULTRA-FINE MATA PEN NEEDLE) 32 gauge x 5/32" Ndle INJECT SC DAILY 30 each 12    ciclopirox (PENLAC) 8 % Soln Apply topically nightly. (Patient not " "taking: Reported on 10/18/2019) 6.6 mL 11    ibuprofen (ADVIL,MOTRIN) 800 MG tablet Take 1 tablet (800 mg total) by mouth 3 (three) times daily as needed for Pain (WITH MEALS). (Patient not taking: Reported on 10/18/2019) 60 tablet 0     No current facility-administered medications for this visit.        ROS  Review of Systems   Constitutional: Negative for chills and fever.   HENT: Negative for hearing loss and sore throat.    Eyes: Negative for visual disturbance.   Respiratory: Negative for cough and shortness of breath.    Cardiovascular: Negative for chest pain, palpitations and leg swelling.   Gastrointestinal: Negative for abdominal pain, constipation, diarrhea, nausea and vomiting.   Genitourinary: Negative for dysuria, frequency and urgency.   Musculoskeletal: Positive for arthralgias (R shoulder). Negative for joint swelling and myalgias.   Skin: Negative for rash and wound.   Neurological: Negative for headaches.   Psychiatric/Behavioral: Negative for agitation and confusion. The patient is not nervous/anxious.          OBJECTIVE     Physical Exam  Vitals:    10/18/19 0847   BP: 126/82   Pulse: 77   Temp: 98.4 °F (36.9 °C)    Body mass index is 24.89 kg/m².  Weight: 78.7 kg (173 lb 8 oz)   Height: 5' 10" (177.8 cm)     Physical Exam   Constitutional: He is oriented to person, place, and time. He appears well-developed and well-nourished. No distress.   HENT:   Head: Normocephalic and atraumatic.   Right Ear: Hearing, tympanic membrane and external ear normal.   Left Ear: Hearing, tympanic membrane and external ear normal.   Nose: Nose normal. No rhinorrhea.   Mouth/Throat: Oropharynx is clear and moist. No uvula swelling. No posterior oropharyngeal edema or posterior oropharyngeal erythema.   Eyes: Conjunctivae and EOM are normal. Right eye exhibits no discharge. Left eye exhibits no discharge. No scleral icterus.   Neck: Normal range of motion. Neck supple. No JVD present. No tracheal deviation present. "   Cardiovascular: Normal rate, regular rhythm, normal heart sounds and intact distal pulses. Exam reveals no gallop and no friction rub.   No murmur heard.  Pulmonary/Chest: Effort normal and breath sounds normal. No respiratory distress. He has no wheezes.   Abdominal: Soft. Bowel sounds are normal. He exhibits no distension and no mass. There is no tenderness. There is no rebound and no guarding.   Musculoskeletal: Normal range of motion. He exhibits no edema, tenderness or deformity.   Neurological: He is alert and oriented to person, place, and time. He exhibits normal muscle tone. Coordination normal.   Skin: Skin is warm and dry. No rash noted. No erythema.   Psychiatric: He has a normal mood and affect. His behavior is normal. Judgment and thought content normal.         Health Maintenance       Date Due Completion Date    TETANUS VACCINE 07/04/1999 ---    Pneumococcal Vaccine (Medium Risk) (1 of 1 - PPSV23) 07/04/2000 ---    Influenza Vaccine (1) 09/01/2019 ---    Eye Exam 10/16/2019 10/16/2018    Hemoglobin A1c 10/19/2019 7/19/2019    Override on 5/17/2016: Done (8.4  dr wise)    Lipid Panel 12/05/2019 12/5/2018    Foot Exam 06/25/2020 6/25/2019 (Done)    Override on 6/25/2019: Done            ASSESSMENT     39 y.o. male with     1. Type 1 diabetes mellitus with microalbuminuria    2. Elevated transaminase level    3. Elevated alkaline phosphatase level    4. Chronic right shoulder pain        PLAN:     1. Type 1 diabetes mellitus with microalbuminuria  - Pt strongly encouraged to strictly adhere to an ADA diet  - CBC auto differential; Future  - Comprehensive metabolic panel; Future  - Hemoglobin A1c; Future  - insulin lispro (HUMALOG KWIKPEN INSULIN) 100 unit/mL pen; Take 12 units before breakfast, 15 units before lunch, and 15 units before dinner  Dispense: 15 mL; Refill: 1  - Pt offered Nutrition referral, but refused reporting he's already completed it     2. Elevated transaminase level  - US Abdomen  Complete; Future    3. Elevated alkaline phosphatase level  - US Abdomen Complete; Future    4. Chronic right shoulder pain  - Pt encouraged to apply ice packs 2-3 times daily at 10 minute intervals x 72 hours, then okay to change to heating compress with care not to burn his self; he  voiced understanding   - Pt okay to take Tylenol or NSAIDs prn pain  - Refused xray and/or Ortho referral today        RTC in 3 months     Izabela Currie MD  10/18/2019 9:11 AM        No follow-ups on file.

## 2019-10-18 NOTE — PROGRESS NOTES
Patient declines flu and pneu vaccine.  Informed patient tetanus vaccine is overdue.  Patient states he has an eye exam appointment with [facility] on [address].

## 2019-10-20 DIAGNOSIS — R80.9 TYPE 1 DIABETES MELLITUS WITH MICROALBUMINURIA: Primary | ICD-10-CM

## 2019-10-20 DIAGNOSIS — E10.29 TYPE 1 DIABETES MELLITUS WITH MICROALBUMINURIA: Primary | ICD-10-CM

## 2019-10-25 DIAGNOSIS — E11.9 TYPE 2 DIABETES MELLITUS WITHOUT COMPLICATION, WITH LONG-TERM CURRENT USE OF INSULIN: ICD-10-CM

## 2019-10-25 DIAGNOSIS — Z79.4 TYPE 2 DIABETES MELLITUS WITHOUT COMPLICATION, WITH LONG-TERM CURRENT USE OF INSULIN: ICD-10-CM

## 2019-10-25 RX ORDER — INSULIN GLARGINE 100 [IU]/ML
INJECTION, SOLUTION SUBCUTANEOUS
Qty: 12 SYRINGE | Refills: 1 | OUTPATIENT
Start: 2019-10-25

## 2019-11-08 DIAGNOSIS — E10.29 TYPE 1 DIABETES MELLITUS WITH MICROALBUMINURIA: ICD-10-CM

## 2019-11-08 DIAGNOSIS — R80.9 TYPE 1 DIABETES MELLITUS WITH MICROALBUMINURIA: ICD-10-CM

## 2019-11-08 RX ORDER — INSULIN GLARGINE 100 [IU]/ML
INJECTION, SOLUTION SUBCUTANEOUS
Qty: 15 ML | Refills: 5 | Status: SHIPPED | OUTPATIENT
Start: 2019-11-08 | End: 2020-03-17 | Stop reason: SDUPTHER

## 2019-11-12 DIAGNOSIS — E11.9 TYPE 2 DIABETES MELLITUS WITHOUT COMPLICATION, WITHOUT LONG-TERM CURRENT USE OF INSULIN: ICD-10-CM

## 2019-11-14 RX ORDER — INSULIN LISPRO 100 [IU]/ML
INJECTION, SOLUTION INTRAVENOUS; SUBCUTANEOUS
Qty: 12 SYRINGE | Refills: 1 | OUTPATIENT
Start: 2019-11-14

## 2019-12-12 DIAGNOSIS — E10.29 TYPE 1 DIABETES MELLITUS WITH MICROALBUMINURIA: ICD-10-CM

## 2019-12-12 DIAGNOSIS — E11.9 TYPE 2 DIABETES MELLITUS WITHOUT COMPLICATION: ICD-10-CM

## 2019-12-12 DIAGNOSIS — R80.9 TYPE 1 DIABETES MELLITUS WITH MICROALBUMINURIA: ICD-10-CM

## 2019-12-12 RX ORDER — DULAGLUTIDE 0.75 MG/.5ML
INJECTION, SOLUTION SUBCUTANEOUS
Qty: 2 SYRINGE | Refills: 1 | OUTPATIENT
Start: 2019-12-12

## 2020-01-14 DIAGNOSIS — E10.29 TYPE 1 DIABETES MELLITUS WITH MICROALBUMINURIA: ICD-10-CM

## 2020-01-14 DIAGNOSIS — R80.9 TYPE 1 DIABETES MELLITUS WITH MICROALBUMINURIA: ICD-10-CM

## 2020-01-14 RX ORDER — LISINOPRIL 2.5 MG/1
TABLET ORAL
Qty: 30 TABLET | Refills: 5 | Status: SHIPPED | OUTPATIENT
Start: 2020-01-14 | End: 2020-07-17

## 2020-01-31 DIAGNOSIS — E11.9 TYPE 2 DIABETES MELLITUS WITHOUT COMPLICATION: ICD-10-CM

## 2020-03-02 DIAGNOSIS — R80.9 TYPE 1 DIABETES MELLITUS WITH MICROALBUMINURIA: ICD-10-CM

## 2020-03-02 DIAGNOSIS — E10.29 TYPE 1 DIABETES MELLITUS WITH MICROALBUMINURIA: ICD-10-CM

## 2020-03-02 RX ORDER — DULAGLUTIDE 1.5 MG/.5ML
INJECTION, SOLUTION SUBCUTANEOUS
Qty: 2 SYRINGE | Refills: 2 | OUTPATIENT
Start: 2020-03-02

## 2020-03-04 ENCOUNTER — TELEPHONE (OUTPATIENT)
Dept: FAMILY MEDICINE | Facility: CLINIC | Age: 40
End: 2020-03-04

## 2020-03-04 NOTE — TELEPHONE ENCOUNTER
LM for patient that their medication was denial due to over due on an appointment. Please call our office to schedule an appointment for any further refills.

## 2020-03-17 ENCOUNTER — TELEPHONE (OUTPATIENT)
Dept: FAMILY MEDICINE | Facility: CLINIC | Age: 40
End: 2020-03-17

## 2020-03-17 DIAGNOSIS — E10.29 TYPE 1 DIABETES MELLITUS WITH MICROALBUMINURIA: ICD-10-CM

## 2020-03-17 DIAGNOSIS — R80.9 TYPE 1 DIABETES MELLITUS WITH MICROALBUMINURIA: ICD-10-CM

## 2020-03-17 NOTE — TELEPHONE ENCOUNTER
Pt came in for appt. Informed  cancelled clinic. He states he really just needs refill on his insulin  Last Office Visit Info:   The patient's last visit with Izabela Currie MD was on 10/18/2019.    The patient's last visit in current department was on 10/18/2019.        Last CBC Results:   Lab Results   Component Value Date    WBC 4.79 10/18/2019    HGB 16.1 10/18/2019    HCT 47.7 10/18/2019     10/18/2019       Last CMP Results  Lab Results   Component Value Date     10/18/2019    K 4.4 10/18/2019     10/18/2019    CO2 29 10/18/2019    BUN 11 10/18/2019    CREATININE 0.8 10/18/2019    CALCIUM 9.4 10/18/2019    ALBUMIN 4.5 10/18/2019    AST 29 10/18/2019    ALT 75 (H) 10/18/2019       Last Lipids  Lab Results   Component Value Date    CHOL 100 (L) 12/05/2018    TRIG 114 12/05/2018    HDL 32 (L) 12/05/2018    LDLCALC 45.2 (L) 12/05/2018       Last A1C  Lab Results   Component Value Date    HGBA1C 9.3 (H) 10/18/2019       Last TSH  Lab Results   Component Value Date    TSH 1.027 07/19/2019         Current Med Refills  Medication List with Changes/Refills   Current Medications    ATORVASTATIN (LIPITOR) 40 MG TABLET    Take 1 tablet (40 mg total) by mouth once daily.       Start Date: 7/19/2019 End Date: --    AZELASTINE (ASTELIN) 137 MCG (0.1 %) NASAL SPRAY    1 spray (137 mcg total) by Nasal route 2 (two) times daily.       Start Date: 4/22/2019 End Date: 4/21/2020    BLOOD SUGAR DIAGNOSTIC STRP    To check BG 4 times daily, to use with insurance preferred meter       Start Date: 12/5/2018 End Date: --    BLOOD-GLUCOSE METER KIT    To check BG 4 times daily, to use with insurance preferred meter       Start Date: 12/5/2018 End Date: 12/5/2019    CICLOPIROX (PENLAC) 8 % SOLN    Apply topically nightly.       Start Date: 5/16/2018 End Date: --    DULAGLUTIDE (TRULICITY) 0.75 MG/0.5 ML PNIJ    Inject 0.5 mLs (0.75 mg total) into the skin every 7 days.       Start Date: 10/20/2019End Date: --  "   DULAGLUTIDE (TRULICITY) 1.5 MG/0.5 ML PNIJ    Inject 1.5 mg into the skin every 7 days.       Start Date: 12/12/2019End Date: --    FLUTICASONE (FLONASE) 50 MCG/ACTUATION NASAL SPRAY    1 spray (50 mcg total) by Each Nare route once daily.       Start Date: 11/5/2018 End Date: --    IBUPROFEN (ADVIL,MOTRIN) 800 MG TABLET    Take 1 tablet (800 mg total) by mouth 3 (three) times daily as needed for Pain (WITH MEALS).       Start Date: 1/21/2019 End Date: --    INSULIN (BASAGLAR KWIKPEN U-100 INSULIN) GLARGINE 100 UNITS/ML (3ML) SUBQ PEN    INJECT 40 UNITS INTO THE SKIN EVERY EVENING.       Start Date: 11/8/2019 End Date: --    INSULIN LISPRO (HUMALOG KWIKPEN INSULIN) 100 UNIT/ML PEN    Take 12 units before breakfast, 15 units before lunch, and 15 units before dinner       Start Date: 10/18/2019End Date: --    LANCETS MISC    To check BG 4 times daily, to use with insurance preferred meter       Start Date: 12/5/2018 End Date: --    LEVOCETIRIZINE (XYZAL) 5 MG TABLET    TAKE 1 TABLET BY MOUTH IN THE EVENING       Start Date: 7/3/2018  End Date: --    LISINOPRIL (PRINIVIL,ZESTRIL) 2.5 MG TABLET    TAKE 1 TABLET BY MOUTH EVERY DAY       Start Date: 1/14/2020 End Date: --    PEN NEEDLE, DIABETIC (BD ULTRA-FINE MATA PEN NEEDLE) 32 GAUGE X 5/32" NDLE    INJECT SC DAILY       Start Date: 5/18/2019 End Date: --                   "

## 2020-03-18 RX ORDER — INSULIN GLARGINE 100 [IU]/ML
INJECTION, SOLUTION SUBCUTANEOUS
Qty: 15 ML | Refills: 5 | Status: SHIPPED | OUTPATIENT
Start: 2020-03-18 | End: 2020-05-07

## 2020-03-18 RX ORDER — INSULIN LISPRO 100 [IU]/ML
INJECTION, SOLUTION INTRAVENOUS; SUBCUTANEOUS
Qty: 15 ML | Refills: 1 | Status: SHIPPED | OUTPATIENT
Start: 2020-03-18 | End: 2020-05-05

## 2020-04-30 ENCOUNTER — OFFICE VISIT (OUTPATIENT)
Dept: FAMILY MEDICINE | Facility: CLINIC | Age: 40
End: 2020-04-30
Payer: COMMERCIAL

## 2020-04-30 DIAGNOSIS — M25.511 ACUTE PAIN OF RIGHT SHOULDER: Primary | ICD-10-CM

## 2020-04-30 PROCEDURE — 99441 PR PHYSICIAN TELEPHONE EVALUATION 5-10 MIN: CPT | Mod: 95,,, | Performed by: INTERNAL MEDICINE

## 2020-04-30 PROCEDURE — 99441 PR PHYSICIAN TELEPHONE EVALUATION 5-10 MIN: ICD-10-PCS | Mod: 95,,, | Performed by: INTERNAL MEDICINE

## 2020-04-30 RX ORDER — IBUPROFEN 800 MG/1
800 TABLET ORAL 3 TIMES DAILY PRN
Qty: 30 TABLET | Refills: 0 | Status: SHIPPED | OUTPATIENT
Start: 2020-04-30 | End: 2021-06-16

## 2020-04-30 RX ORDER — TIZANIDINE 4 MG/1
4 TABLET ORAL EVERY 8 HOURS PRN
Qty: 30 TABLET | Refills: 0 | Status: SHIPPED | OUTPATIENT
Start: 2020-04-30 | End: 2020-07-02

## 2020-04-30 NOTE — PROGRESS NOTES
Established Patient - Audio Only Telehealth Visit     The patient location is: home  The chief complaint leading to consultation is: R shoulder  Visit type: Virtual visit with audio only (telephone)     The reason for the audio only service rather than synchronous audio and video virtual visit was related to technical difficulties or patient preference/necessity.     Each patient to whom I provide medical services by telemedicine is:  (1) informed of the relationship between the physician and patient and the respective role of any other health care provider with respect to management of the patient; and (2) notified that they may decline to receive medical services by telemedicine and may withdraw from such care at any time. Patient verbally consented to receive this service via voice-only telephone call.       HPI: Pt reports R shoulder pain x 1.5 weeks. He denies any preceding trauma, falls, or heavy lifting. His pain is sharp at an 8-9/10 and constant in nature without radiation. Pain is worse when he's laying down, particularly on his back. Pt has been applying Biofreeze and Tramadol with relief of pain from the Tramadol.     Assessment and plan:  38 yo M with     1. Acute pain of right shoulder  - Pt advised to rest and avoid heavy lifting of >5-10 lbs  - Pt encouraged to apply ice packs 2-3 times daily at 10 minute intervals x 72 hours, then okay to change to heating compress with care not to burn his self; he  voiced understanding   - ibuprofen (ADVIL,MOTRIN) 800 MG tablet; Take 1 tablet (800 mg total) by mouth 3 (three) times daily as needed for Pain (TAKE WITH MEALS).  Dispense: 30 tablet; Refill: 0  - tiZANidine (ZANAFLEX) 4 MG tablet; Take 1 tablet (4 mg total) by mouth every 8 (eight) hours as needed (MAY CAUSE DROWSINESS; IF SO, ONLY TAKE NIGHTLY AS NEEDED).  Dispense: 30 tablet; Refill: 0         RTC in 1-2 weeks as needed for any acute worsening of current condition or failure to improve             This service was not originating from a related E/M service provided within the previous 7 days nor will  to an E/M service or procedure within the next 24 hours or my soonest available appointment.  Prevailing standard of care was able to be met in this audio-only visit.

## 2020-05-04 ENCOUNTER — TELEPHONE (OUTPATIENT)
Dept: FAMILY MEDICINE | Facility: CLINIC | Age: 40
End: 2020-05-04

## 2020-05-04 DIAGNOSIS — R80.9 TYPE 1 DIABETES MELLITUS WITH MICROALBUMINURIA: Primary | ICD-10-CM

## 2020-05-04 DIAGNOSIS — E10.29 TYPE 1 DIABETES MELLITUS WITH MICROALBUMINURIA: Primary | ICD-10-CM

## 2020-05-04 NOTE — TELEPHONE ENCOUNTER
[facility] is stating patient's ins, is not covering Humalog anymore the alternative is Fiasp NOvolog, please send in a new RX, thanks

## 2020-05-05 RX ORDER — INSULIN ASPART 100 [IU]/ML
INJECTION, SOLUTION INTRAVENOUS; SUBCUTANEOUS
Qty: 15 ML | Refills: 3 | Status: SHIPPED | OUTPATIENT
Start: 2020-05-05 | End: 2020-08-24

## 2020-05-07 DIAGNOSIS — R80.9 TYPE 1 DIABETES MELLITUS WITH MICROALBUMINURIA: ICD-10-CM

## 2020-05-07 DIAGNOSIS — E10.29 TYPE 1 DIABETES MELLITUS WITH MICROALBUMINURIA: ICD-10-CM

## 2020-05-07 RX ORDER — INSULIN GLARGINE 100 [IU]/ML
INJECTION, SOLUTION SUBCUTANEOUS
Qty: 36 SYRINGE | Refills: 2 | Status: SHIPPED | OUTPATIENT
Start: 2020-05-07 | End: 2021-06-04 | Stop reason: SDUPTHER

## 2020-05-19 ENCOUNTER — NURSE TRIAGE (OUTPATIENT)
Dept: ADMINISTRATIVE | Facility: CLINIC | Age: 40
End: 2020-05-19

## 2020-05-19 NOTE — TELEPHONE ENCOUNTER
Call placed to Pt, no answer. Message left on voicemail informing Pt that I was doing a courtesy call to check on his status and to see if he is going to the ED for evaluation and treatment.

## 2020-05-19 NOTE — TELEPHONE ENCOUNTER
For the last 2 weeks pt has been having numbness with pain in both hands. Numbness from below elbow to hands. Muscle relaxer that were prescribed by Dr Currie are not helping pain 9/10. Tingling both hands pins and needles. Care advice recommend pt go to office now. Unable to schedule appointment. Pt instructed to go to [facility] because no appointments were available for today. Not sure if pt is going. Please call and advise.      Reason for Disposition   Tingling (e.g., pins and needles) of the face, arm or leg on one side of the body, that is  present now    Additional Information   Negative: Difficult to awaken or acting confused (e.g., disoriented, slurred speech)   Negative: New neurologic deficit that is present NOW, sudden onset of ANY of the following: * Weakness of the face, arm, or leg on one side of the body * Numbness of the face, arm, or leg on one side of the body * Loss of speech or garbled speech   Negative: Sounds like a life-threatening emergency to the triager   Negative: Headache (with neurologic deficit)   Negative: Unable to urinate (or only a few drops) and bladder feels very full   Negative: Loss of control of bowel or bladder (i.e., incontinence) of new onset   Negative: Back pain with numbness (loss of sensation) in groin or rectal area   Negative: Patient sounds very sick or weak to the triager   Negative: Neurologic deficit that was brief (now gone), ANY of the following: * Weakness of the face, arm, or leg on one side of the body * Numbness of the face, arm, or leg on one side of the body * Loss of speech or garbled speech   Negative: Royal palsy suspected (i.e., weakness only one side of the face, developing over hours to days, no other symptoms)   Negative: Neurologic deficit of gradual onset, ANY of the following: * Weakness of the face, arm, or leg on one side of the body * Numbness of the face, arm, or leg on one side of the body * Loss of speech or garbled  speech    Protocols used: NEUROLOGIC DEFICIT-A-OH

## 2020-05-22 ENCOUNTER — LAB VISIT (OUTPATIENT)
Dept: LAB | Facility: HOSPITAL | Age: 40
End: 2020-05-22
Attending: INTERNAL MEDICINE
Payer: COMMERCIAL

## 2020-05-22 ENCOUNTER — OFFICE VISIT (OUTPATIENT)
Dept: FAMILY MEDICINE | Facility: CLINIC | Age: 40
End: 2020-05-22
Payer: COMMERCIAL

## 2020-05-22 VITALS
BODY MASS INDEX: 23.8 KG/M2 | HEART RATE: 87 BPM | OXYGEN SATURATION: 99 % | RESPIRATION RATE: 16 BRPM | TEMPERATURE: 97 F | SYSTOLIC BLOOD PRESSURE: 120 MMHG | DIASTOLIC BLOOD PRESSURE: 70 MMHG | HEIGHT: 70 IN | WEIGHT: 166.25 LBS

## 2020-05-22 DIAGNOSIS — E11.9 TYPE 2 DIABETES MELLITUS WITHOUT COMPLICATION, UNSPECIFIED WHETHER LONG TERM INSULIN USE: ICD-10-CM

## 2020-05-22 DIAGNOSIS — G56.03 BILATERAL CARPAL TUNNEL SYNDROME: Primary | ICD-10-CM

## 2020-05-22 DIAGNOSIS — R80.9 TYPE 1 DIABETES MELLITUS WITH MICROALBUMINURIA: ICD-10-CM

## 2020-05-22 DIAGNOSIS — G56.03 BILATERAL CARPAL TUNNEL SYNDROME: ICD-10-CM

## 2020-05-22 DIAGNOSIS — E10.29 TYPE 1 DIABETES MELLITUS WITH MICROALBUMINURIA: ICD-10-CM

## 2020-05-22 DIAGNOSIS — E11.9 TYPE 2 DIABETES MELLITUS WITHOUT COMPLICATION: ICD-10-CM

## 2020-05-22 DIAGNOSIS — R74.8 ELEVATED ALKALINE PHOSPHATASE LEVEL: ICD-10-CM

## 2020-05-22 DIAGNOSIS — R74.01 ELEVATED TRANSAMINASE LEVEL: ICD-10-CM

## 2020-05-22 LAB
ALBUMIN SERPL BCP-MCNC: 3.9 G/DL (ref 3.5–5.2)
ALP SERPL-CCNC: 125 U/L (ref 55–135)
ALT SERPL W/O P-5'-P-CCNC: 44 U/L (ref 10–44)
ANION GAP SERPL CALC-SCNC: 6 MMOL/L (ref 8–16)
AST SERPL-CCNC: 26 U/L (ref 10–40)
BILIRUB SERPL-MCNC: 0.9 MG/DL (ref 0.1–1)
BUN SERPL-MCNC: 12 MG/DL (ref 6–20)
CALCIUM SERPL-MCNC: 9.1 MG/DL (ref 8.7–10.5)
CHLORIDE SERPL-SCNC: 104 MMOL/L (ref 95–110)
CHOLEST SERPL-MCNC: 105 MG/DL (ref 120–199)
CHOLEST/HDLC SERPL: 3 {RATIO} (ref 2–5)
CO2 SERPL-SCNC: 28 MMOL/L (ref 23–29)
CREAT SERPL-MCNC: 0.8 MG/DL (ref 0.5–1.4)
EST. GFR  (AFRICAN AMERICAN): >60 ML/MIN/1.73 M^2
EST. GFR  (NON AFRICAN AMERICAN): >60 ML/MIN/1.73 M^2
ESTIMATED AVG GLUCOSE: 243 MG/DL (ref 68–131)
GLUCOSE SERPL-MCNC: 195 MG/DL (ref 70–110)
HBA1C MFR BLD HPLC: 10.1 % (ref 4–5.6)
HDLC SERPL-MCNC: 35 MG/DL (ref 40–75)
HDLC SERPL: 33.3 % (ref 20–50)
LDLC SERPL CALC-MCNC: 54.2 MG/DL (ref 63–159)
NONHDLC SERPL-MCNC: 70 MG/DL
POTASSIUM SERPL-SCNC: 5.1 MMOL/L (ref 3.5–5.1)
PROT SERPL-MCNC: 6.9 G/DL (ref 6–8.4)
SODIUM SERPL-SCNC: 138 MMOL/L (ref 136–145)
TRIGL SERPL-MCNC: 79 MG/DL (ref 30–150)

## 2020-05-22 PROCEDURE — 80053 COMPREHEN METABOLIC PANEL: CPT

## 2020-05-22 PROCEDURE — 83036 HEMOGLOBIN GLYCOSYLATED A1C: CPT

## 2020-05-22 PROCEDURE — 99999 PR PBB SHADOW E&M-EST. PATIENT-LVL V: CPT | Mod: PBBFAC,,, | Performed by: PHYSICIAN ASSISTANT

## 2020-05-22 PROCEDURE — 99214 PR OFFICE/OUTPT VISIT, EST, LEVL IV, 30-39 MIN: ICD-10-PCS | Mod: S$GLB,,, | Performed by: PHYSICIAN ASSISTANT

## 2020-05-22 PROCEDURE — 99999 PR PBB SHADOW E&M-EST. PATIENT-LVL V: ICD-10-PCS | Mod: PBBFAC,,, | Performed by: PHYSICIAN ASSISTANT

## 2020-05-22 PROCEDURE — 99214 OFFICE O/P EST MOD 30 MIN: CPT | Mod: S$GLB,,, | Performed by: PHYSICIAN ASSISTANT

## 2020-05-22 PROCEDURE — 80061 LIPID PANEL: CPT

## 2020-05-22 RX ORDER — GABAPENTIN 100 MG/1
100 CAPSULE ORAL 3 TIMES DAILY
Qty: 90 CAPSULE | Refills: 0 | Status: SHIPPED | OUTPATIENT
Start: 2020-05-22 | End: 2020-06-15
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
## 2020-05-22 NOTE — PATIENT INSTRUCTIONS
Carpal Tunnel Syndrome    Carpal tunnel syndrome is a painful condition of the wrist and arm. It is caused by pressure on the median nerve.  The median nerve is one of the nerves that give feeling and movement to the hand. It passes through a tunnel in the wrist called the carpal tunnel. This tunnel is made up of bones and ligaments. Narrowing of this tunnel or swelling of the tissues inside the tunnel puts pressure on the median nerve. This causes numbness, pins and needles, or electric shooting pains in your hand and forearm. Often the pain is worse at night and may wake you when you are asleep.  Carpal tunnel syndrome may occur during pregnancy and with use of birth control pills. It is more common in workers who must often bend their wrists. It is also common in people who work with power tools that cause strong vibrations.  Home care  · Rest the painful wrist. Avoid repeated bending of the wrist back and forth. This puts pressure on the median nerve. Avoid using power tools with strong vibrations.  · If you were given a splint, wear it at night while you sleep. You may also wear it during the day for comfort.  · Move your fingers and wrists often to avoid stiffness.  · Elevate your arms on pillows when you lie down.  · Try using the unaffected hand more.  · Try not to hold your wrists in a bent, downward position.  · Sometimes changes in the work place may ease symptoms. If you type most of the day, it may help to change the position of your keyboard or add a wrist support. Your wrist should be in a neutral position and not bent back when typing.  · You may use over-the-counter pain medicine to treat pain and inflammation, unless another medicine was prescribed. Anti-inflammatory pain medicines, such as ibuprofen or naproxen may be more effective than acetaminophen, which treats pain, but not inflammation. If you have chronic liver or kidney disease or ever had a stomach ulcer or GI bleeding, talk with your  doctor before using these medicines.  · Opioid pain medicine will only give temporary relief and does not treat the problem. If pain continues, you may need a shot of a steroid drug into your wrist.  · If the above methods fail, you may need surgery. This will open the carpal tunnel and release the pressure on the trapped nerve.  Follow-up care  Follow up with your healthcare provider, or as advised, if the pain doesnt begin to improve within the next week.  If X-rays were taken, you will be notified of any new findings that may affect your care.  When to seek medical advice  Call your healthcare provider right away if any of these occur:  · Pain not improving with the above treatment  · Fingers or hand become cold, blue, numb, or tingly  · Your whole arm becomes swollen or weak  Date Last Reviewed: 11/23/2015  © 0018-2839 [employer]. [address] [place] [address]. All rights reserved. This information is not intended as a substitute for professional medical care. Always follow your healthcare professional's instructions.        Carpal Tunnel Syndrome Prevention Tips  Some repetitive hand activities put you at higher risk for carpal tunnel syndrome (CTS). But you can reduce your risk. Learn how to change the way you use your hands. Below are tips for at home and on the job. Be sure to also follow the hand and wrist safety policies at your workplace.      Keep your wrist in a neutral (straight) position when exercising.      Keep your wrist in neutral  Keep a neutral (straight) wrist position as often as you can. Dont use your wrist in a bent (flexed) position for long periods of time. This includes extended or twisted positions.  Watch your   Dont just use your thumb and index finger to grasp or lift. This can put stress on your wrist. When you can, use your whole hand and all its fingers to grasp an object.  Minimize repetition  Dont move your arms or hands or hold an object in  the same way for long periods of time. Even simple, light tasks can cause injury this way. Instead, alternate tasks or switch hands.  Rest your hands  Give your hands a break from time to time with a rest. Even a few minutes once an hour can help.  Reduce speed and force  Slow down the speed in which you do a forceful, repetitive motion. This gives your wrist time to recover from the effort. Use power tools to help reduce the force.  Strengthen the muscles  Weak muscles may lead to a poor wrist or arm position. Exercises will make your hand and arm muscles stronger. This can help you keep a better position.  Date Last Reviewed: 9/11/2015 © 2000-2017 [employer]. [address] [place] [address], [place] [address]. All rights reserved. This information is not intended as a substitute for professional medical care. Always follow your healthcare professional's instructions.        Understanding Carpal Tunnel Syndrome    The carpal tunnel is a narrow space inside the wrist. It is ringed by bone and a band of tough tissue called the transverse carpal ligament. A major nerve called the median nerve runs from the forearm into the hand through the carpal tunnel. Tendons also run through the carpal tunnel.  With carpal tunnel syndrome, the tendons or nearby tissues within the carpal tunnel may swell or thicken. Or the transverse carpal ligament may harden and shorten. This narrows the space in the carpal tunnel and puts pressure on the median nerve. This pressure leads to tingling and numbness of the hand and wrist. In time, the condition can make even simple tasks hard to do.  What causes carpal tunnel syndrome?  Doctors arent entirely clear why the condition occurs. Certain things may make a person more likely to have it. These include:  · Being female  · Being pregnant  · Being overweight  · Having diabetes or rheumatoid arthritis  Symptoms of carpal tunnel syndrome  Symptoms often come and go. At first, symptoms  may occur mainly at night. Later, they may be noticed during the day as well. They may get worse with activities such as driving, reading, typing, or holding a phone. Symptoms can include:  · Tingling and numbness in the hand or wrist  · Sharp pain that shoots up the arm or down to the fingers  · Hand stiffness or cramping, especially in the morning  · Trouble making a fist  · Hand weakness and clumsiness  Treatment for carpal tunnel syndrome  Certain treatments help reduce the pressure on the median nerve and relieve symptoms. Choices for treatment may include one or more of the following:  · Wrist splint. This involves wearing a special brace on the wrist and hand. The splint holds the wrist straight, in a neutral position. This helps keep the carpal tunnel as open as possible.  · Cortisone shots. Cortisone is a medicine that helps reduce swelling. It is injected directly into the wrist. It helps shrink tissues inside the carpal tunnel. This relieves symptoms for a time.  · Pain medicines. You may take over-the-counter or prescription medicines to help reduce swelling and relieve symptoms.  · Surgery. If the condition doesnt respond to other treatments and doesnt go away on its own, you may need surgery. During surgery, the surgeon cuts the transverse carpal ligament to relieve pressure on the median nerve.     When to call your healthcare provider  Call your healthcare provider right away if you have any of these:  · Fever of 100.4°F (38°C) or higher, or as directed  · Symptoms that dont get better, or get worse  · New symptoms   Date Last Reviewed: 3/10/2016  © 1277-2729 [employer]. [address] [place] [address], [place] [address]. All rights reserved. This information is not intended as a substitute for professional medical care. Always follow your healthcare professional's instructions.

## 2020-06-08 PROCEDURE — 95911 NRV CNDJ TEST 9-10 STUDIES: CPT | Mod: S$GLB,,, | Performed by: NEUROLOGICAL SURGERY

## 2020-06-08 PROCEDURE — 95886 PR EMG COMPLETE, W/ NERVE CONDUCTION STUDIES, 5+ MUSCLES: ICD-10-PCS | Mod: S$GLB,,, | Performed by: NEUROLOGICAL SURGERY

## 2020-06-08 PROCEDURE — 95911 PR NERVE CONDUCTION STUDY; 9-10 STUDIES: ICD-10-PCS | Mod: S$GLB,,, | Performed by: NEUROLOGICAL SURGERY

## 2020-06-08 PROCEDURE — 95886 MUSC TEST DONE W/N TEST COMP: CPT | Mod: S$GLB,,, | Performed by: NEUROLOGICAL SURGERY

## 2020-06-09 ENCOUNTER — TELEPHONE (OUTPATIENT)
Dept: FAMILY MEDICINE | Facility: CLINIC | Age: 40
End: 2020-06-09

## 2020-06-09 ENCOUNTER — PROCEDURE VISIT (OUTPATIENT)
Dept: NEUROLOGY | Facility: CLINIC | Age: 40
End: 2020-06-09
Payer: COMMERCIAL

## 2020-06-09 VITALS — WEIGHT: 166 LBS | BODY MASS INDEX: 23.77 KG/M2 | HEIGHT: 70 IN

## 2020-06-09 DIAGNOSIS — G56.03 BILATERAL CARPAL TUNNEL SYNDROME: ICD-10-CM

## 2020-06-09 NOTE — TELEPHONE ENCOUNTER
----- Message from Arlette Barraza sent at 6/9/2020  3:19 PM CDT -----  Contact: pt  Type: Patient Call Back    Who called:pt    What is the request in detail:pt returned the nurse's phone call. Call pt    Can the clinic reply by [facility]?    Would the patient rather a call back or a response via [facility]? call    Best call back number:[phone]      Additional Information:

## 2020-06-09 NOTE — TELEPHONE ENCOUNTER
----- Message from RYAN Ramires sent at 6/9/2020  2:41 PM CDT -----  Test showed mild carpal tunnel   Continue with wrist splits and gabapentin. If that doesn't help, we can refer to ortho

## 2020-06-23 ENCOUNTER — PATIENT OUTREACH (OUTPATIENT)
Dept: ADMINISTRATIVE | Facility: HOSPITAL | Age: 40
End: 2020-06-23

## 2020-07-06 ENCOUNTER — TELEPHONE (OUTPATIENT)
Dept: FAMILY MEDICINE | Facility: CLINIC | Age: 40
End: 2020-07-06

## 2020-07-06 DIAGNOSIS — Z20.822 CLOSE EXPOSURE TO COVID-19 VIRUS: Primary | ICD-10-CM

## 2020-07-06 DIAGNOSIS — Z01.84 ENCOUNTER FOR ANTIBODY RESPONSE EXAMINATION: ICD-10-CM

## 2020-07-06 NOTE — TELEPHONE ENCOUNTER
----- Message from Christiana Watson sent at 7/6/2020 10:45 AM CDT -----    Name of Who is Calling:ALYSSA PATIÑO [[id number]]    What is the request in detail: pt would like to have order Anti bodies test Please contact to further discuss and advise    Can the clinic reply by [facility]: No      What Number to Call Back if not in [facility]: [phone]

## 2020-07-07 ENCOUNTER — OFFICE VISIT (OUTPATIENT)
Dept: FAMILY MEDICINE | Facility: CLINIC | Age: 40
End: 2020-07-07
Payer: COMMERCIAL

## 2020-07-07 VITALS
WEIGHT: 175.5 LBS | TEMPERATURE: 99 F | HEART RATE: 79 BPM | OXYGEN SATURATION: 97 % | DIASTOLIC BLOOD PRESSURE: 86 MMHG | BODY MASS INDEX: 25.13 KG/M2 | SYSTOLIC BLOOD PRESSURE: 118 MMHG | HEIGHT: 70 IN

## 2020-07-07 DIAGNOSIS — R80.9 TYPE 1 DIABETES MELLITUS WITH MICROALBUMINURIA: ICD-10-CM

## 2020-07-07 DIAGNOSIS — G56.03 BILATERAL CARPAL TUNNEL SYNDROME: ICD-10-CM

## 2020-07-07 DIAGNOSIS — Z20.822 CLOSE EXPOSURE TO COVID-19 VIRUS: Primary | ICD-10-CM

## 2020-07-07 DIAGNOSIS — E10.29 TYPE 1 DIABETES MELLITUS WITH MICROALBUMINURIA: ICD-10-CM

## 2020-07-07 DIAGNOSIS — M25.511 ACUTE PAIN OF RIGHT SHOULDER: ICD-10-CM

## 2020-07-07 DIAGNOSIS — Z03.818 ENCOUNTER FOR OBSERVATION FOR SUSPECTED EXPOSURE TO OTHER BIOLOGICAL AGENTS RULED OUT: ICD-10-CM

## 2020-07-07 PROCEDURE — 99214 OFFICE O/P EST MOD 30 MIN: CPT | Mod: S$GLB,,, | Performed by: INTERNAL MEDICINE

## 2020-07-07 PROCEDURE — 99999 PR PBB SHADOW E&M-EST. PATIENT-LVL IV: CPT | Mod: PBBFAC,,, | Performed by: INTERNAL MEDICINE

## 2020-07-07 PROCEDURE — U0003 INFECTIOUS AGENT DETECTION BY NUCLEIC ACID (DNA OR RNA); SEVERE ACUTE RESPIRATORY SYNDROME CORONAVIRUS 2 (SARS-COV-2) (CORONAVIRUS DISEASE [COVID-19]), AMPLIFIED PROBE TECHNIQUE, MAKING USE OF HIGH THROUGHPUT TECHNOLOGIES AS DESCRIBED BY CMS-2020-01-R: HCPCS

## 2020-07-07 PROCEDURE — 99214 PR OFFICE/OUTPT VISIT, EST, LEVL IV, 30-39 MIN: ICD-10-PCS | Mod: S$GLB,,, | Performed by: INTERNAL MEDICINE

## 2020-07-07 PROCEDURE — 99999 PR PBB SHADOW E&M-EST. PATIENT-LVL IV: ICD-10-PCS | Mod: PBBFAC,,, | Performed by: INTERNAL MEDICINE

## 2020-07-07 RX ORDER — TIZANIDINE 4 MG/1
TABLET ORAL
Qty: 90 TABLET | Refills: 1 | Status: SHIPPED | OUTPATIENT
Start: 2020-07-07 | End: 2020-08-10

## 2020-07-07 NOTE — PROGRESS NOTES
SUBJECTIVE     No chief complaint on file.      HPI  Jay Meyer is a 40 y.o. male with multiple medical diagnoses as listed in the medical history and problem list that presents for evaluation of COVID19 testing. Pt reports he is required to have testing for COVID19 per his job at [employer] throughout [place] and [place]. He has been around everyone in the workplace, but is practicing social distancing and wearing a mask consistently. Pt denies any fever, chills, or night sweats. Pt would also like to get a refill on his muscle relaxant used for shoulder discomfort, because it helps with his carpal tunnel. Pt reports he can not tolerate Gabapentin.    PAST MEDICAL HISTORY:  Past Medical History:   Diagnosis Date    Diabetes mellitus type I     since 2003; dx at 22       PAST SURGICAL HISTORY:  Past Surgical History:   Procedure Laterality Date    VASECTOMY         SOCIAL HISTORY:  Social History     Socioeconomic History    Marital status:      Spouse name: Not on file    Number of children: Not on file    Years of education: Not on file    Highest education level: Not on file   Occupational History     Comment:    Social Needs    Financial resource strain: Not on file    Food insecurity     Worry: Not on file     Inability: Not on file    Transportation needs     Medical: Not on file     Non-medical: Not on file   Tobacco Use    Smoking status: Former Smoker     Quit date: 1/23/2010     Years since quitting: 10.4    Smokeless tobacco: Current User     Types: Snuff   Substance and Sexual Activity    Alcohol use: Yes     Alcohol/week: 2.0 standard drinks     Types: 2 Cans of beer per week    Drug use: Not on file    Sexual activity: Yes     Partners: Female   Lifestyle    Physical activity     Days per week: Not on file     Minutes per session: Not on file    Stress: Very much   Relationships    Social connections     Talks on phone: Not on file     Gets together: Not on file      "Attends Gnosticism service: Not on file     Active member of club or organization: Not on file     Attends meetings of clubs or organizations: Not on file     Relationship status: Not on file   Other Topics Concern    Not on file   Social History Narrative    Not on file       FAMILY HISTORY:  Family History   Problem Relation Age of Onset    Diabetes Father     Diabetes Brother     Vision loss Brother     Diabetes type II Paternal Grandfather     Heart attack Paternal Grandfather     Diabetes type II Brother        ALLERGIES AND MEDICATIONS: updated and reviewed.  Review of patient's allergies indicates:  No Known Allergies  Current Outpatient Medications   Medication Sig Dispense Refill    atorvastatin (LIPITOR) 40 MG tablet Take 1 tablet (40 mg total) by mouth once daily. 90 tablet 3    BASAGLAR KWIKPEN U-100 INSULIN glargine 100 units/mL (3mL) SubQ pen INJECT 40 UNITS INTO THE SKIN EVERY EVENING. 36 Syringe 2    blood sugar diagnostic Strp To check BG 4 times daily, to use with insurance preferred meter 400 each 3    dulaglutide (TRULICITY) 1.5 mg/0.5 mL pen injector Inject 1.5 mg into the skin every 7 days. 4 mL 2    fluticasone (FLONASE) 50 mcg/actuation nasal spray 1 spray (50 mcg total) by Each Nare route once daily. 16 g 0    insulin aspart U-100 (NOVOLOG FLEXPEN U-100 INSULIN) 100 unit/mL (3 mL) InPn pen Take 12 units before breakfast, 15 units before lunch, and 15 units before dinner 15 mL 3    lancets Misc To check BG 4 times daily, to use with insurance preferred meter 400 each 3    levocetirizine (XYZAL) 5 MG tablet TAKE 1 TABLET BY MOUTH IN THE EVENING 30 tablet 5    lisinopril (PRINIVIL,ZESTRIL) 2.5 MG tablet TAKE 1 TABLET BY MOUTH EVERY DAY 30 tablet 5    pen needle, diabetic (BD ULTRA-FINE MATA PEN NEEDLE) 32 gauge x 5/32" Ndle INJECT SC DAILY 30 each 12    tiZANidine (ZANAFLEX) 4 MG tablet TAKE 1 TAB BY MOUTH EVERY 8 HRS AS NEEDED (MAY CAUSE DROWSINESS IF SO, ONLY TAKE NIGHTLY AS " "NEEDED). 90 tablet 1    azelastine (ASTELIN) 137 mcg (0.1 %) nasal spray 1 spray (137 mcg total) by Nasal route 2 (two) times daily. 30 mL 3    blood-glucose meter kit To check BG 4 times daily, to use with insurance preferred meter 1 each 0    ciclopirox (PENLAC) 8 % Soln Apply topically nightly. (Patient not taking: Reported on 10/18/2019) 6.6 mL 11    ibuprofen (ADVIL,MOTRIN) 800 MG tablet Take 1 tablet (800 mg total) by mouth 3 (three) times daily as needed for Pain (TAKE WITH MEALS). (Patient not taking: Reported on 5/22/2020) 30 tablet 0     No current facility-administered medications for this visit.        ROS  Review of Systems   Constitutional: Negative for chills and fever.   HENT: Negative for hearing loss and sore throat.    Eyes: Negative for visual disturbance.   Respiratory: Negative for cough and shortness of breath.    Cardiovascular: Negative for chest pain, palpitations and leg swelling.   Gastrointestinal: Negative for abdominal pain, constipation, diarrhea, nausea and vomiting.   Genitourinary: Negative for dysuria, frequency and urgency.   Musculoskeletal: Negative for arthralgias, joint swelling and myalgias.   Skin: Negative for rash and wound.   Neurological: Negative for headaches.   Psychiatric/Behavioral: Negative for agitation and confusion. The patient is not nervous/anxious.          OBJECTIVE     Physical Exam  Vitals:    07/07/20 0949   BP: 118/86   Pulse: 79   Temp: 98.6 °F (37 °C)    Body mass index is 25.18 kg/m².  Weight: 79.6 kg (175 lb 7.8 oz)   Height: 5' 10" (177.8 cm)     Physical Exam  Constitutional:       General: He is not in acute distress.     Appearance: He is well-developed.   HENT:      Head: Normocephalic and atraumatic.      Right Ear: External ear normal.      Left Ear: External ear normal.      Nose: Nose normal.   Eyes:      General: No scleral icterus.        Right eye: No discharge.         Left eye: No discharge.      Conjunctiva/sclera: Conjunctivae " normal.   Neck:      Musculoskeletal: Normal range of motion and neck supple.      Vascular: No JVD.      Trachea: No tracheal deviation.   Pulmonary:      Effort: Pulmonary effort is normal. No respiratory distress.   Musculoskeletal: Normal range of motion.         General: No tenderness or deformity.   Skin:     General: Skin is warm and dry.      Findings: No erythema or rash.   Neurological:      Mental Status: He is alert and oriented to person, place, and time.      Motor: No abnormal muscle tone.      Coordination: Coordination normal.   Psychiatric:         Behavior: Behavior normal.         Thought Content: Thought content normal.         Judgment: Judgment normal.           Health Maintenance       Date Due Completion Date    HIV Screening 07/04/1996 ---    TETANUS VACCINE 07/04/1999 ---    Eye Exam 10/16/2019 10/16/2018    Foot Exam 06/25/2020 6/25/2019 (Done)    Override on 6/25/2019: Done    Pneumococcal Vaccine (Medium Risk) (1 of 1 - PPSV23) 10/18/2020 (Originally 7/4/2000) ---    Hemoglobin A1c 08/22/2020 5/22/2020    Override on 5/17/2016: Done (8.4  dr wise)    Influenza Vaccine (1) 09/01/2020 ---    Lipid Panel 05/22/2021 5/22/2020            ASSESSMENT     40 y.o. male with     1. Close Exposure to Covid-19 Virus    2. Encounter for observation for suspected exposure to other biological agents ruled out    3. Bilateral carpal tunnel syndrome    4. Acute pain of right shoulder    5. Type 1 diabetes mellitus with microalbuminuria        PLAN:     1. 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 Encounter for observation for suspected exposure to other biological agents ruled out  - COVID-19 Routine Screening    3. Bilateral carpal tunnel syndrome  - tiZANidine (ZANAFLEX) 4 MG tablet; TAKE 1 TAB BY MOUTH EVERY 8 HRS AS NEEDED (MAY CAUSE DROWSINESS IF SO, ONLY TAKE NIGHTLY AS NEEDED).   Dispense: 90 tablet; Refill: 1    4. Acute pain of right shoulder  - Stable; no acute issues  - The current medical regimen is effective;  continue present plan and medications.  - tiZANidine (ZANAFLEX) 4 MG tablet; TAKE 1 TAB BY MOUTH EVERY 8 HRS AS NEEDED (MAY CAUSE DROWSINESS IF SO, ONLY TAKE NIGHTLY AS NEEDED).  Dispense: 90 tablet; Refill: 1    5. Type 1 diabetes mellitus with microalbuminuria  - patient informed that Trulicity designed for type 2 diabetics but due to elevated blood sugar readings will attempt Trulicity along with insulin as previously ordered  - dulaglutide (TRULICITY) 1.5 mg/0.5 mL pen injector; Inject 1.5 mg into the skin every 7 days.  Dispense: 4 mL; Refill: 2        RTC in 3 months     Izabela Currie MD  07/07/2020 10:00 AM        No follow-ups on file.

## 2020-07-10 LAB — SARS-COV-2 RNA RESP QL NAA+PROBE: NOT DETECTED

## 2020-09-11 DIAGNOSIS — E11.9 TYPE 2 DIABETES MELLITUS WITHOUT COMPLICATION: ICD-10-CM

## 2020-10-05 ENCOUNTER — PATIENT MESSAGE (OUTPATIENT)
Dept: ADMINISTRATIVE | Facility: HOSPITAL | Age: 40
End: 2020-10-05

## 2021-01-05 ENCOUNTER — PATIENT MESSAGE (OUTPATIENT)
Dept: ADMINISTRATIVE | Facility: HOSPITAL | Age: 41
End: 2021-01-05

## 2021-03-04 ENCOUNTER — TELEPHONE (OUTPATIENT)
Dept: FAMILY MEDICINE | Facility: CLINIC | Age: 41
End: 2021-03-04

## 2021-04-06 ENCOUNTER — PATIENT MESSAGE (OUTPATIENT)
Dept: ADMINISTRATIVE | Facility: HOSPITAL | Age: 41
End: 2021-04-06

## 2021-04-06 LAB
LEFT EYE DM RETINOPATHY: NEGATIVE
RIGHT EYE DM RETINOPATHY: NEGATIVE

## 2021-04-15 ENCOUNTER — TELEPHONE (OUTPATIENT)
Dept: FAMILY MEDICINE | Facility: CLINIC | Age: 41
End: 2021-04-15

## 2021-04-19 ENCOUNTER — TELEPHONE (OUTPATIENT)
Dept: FAMILY MEDICINE | Facility: CLINIC | Age: 41
End: 2021-04-19

## 2021-04-23 ENCOUNTER — TELEPHONE (OUTPATIENT)
Dept: FAMILY MEDICINE | Facility: CLINIC | Age: 41
End: 2021-04-23

## 2021-04-26 ENCOUNTER — TELEPHONE (OUTPATIENT)
Dept: FAMILY MEDICINE | Facility: CLINIC | Age: 41
End: 2021-04-26

## 2021-04-27 ENCOUNTER — PATIENT OUTREACH (OUTPATIENT)
Dept: ADMINISTRATIVE | Facility: HOSPITAL | Age: 41
End: 2021-04-27

## 2021-04-30 ENCOUNTER — TELEPHONE (OUTPATIENT)
Dept: FAMILY MEDICINE | Facility: CLINIC | Age: 41
End: 2021-04-30

## 2021-05-03 ENCOUNTER — TELEPHONE (OUTPATIENT)
Dept: FAMILY MEDICINE | Facility: CLINIC | Age: 41
End: 2021-05-03

## 2021-05-05 DIAGNOSIS — E11.9 TYPE 2 DIABETES MELLITUS WITHOUT COMPLICATION: ICD-10-CM

## 2021-05-26 DIAGNOSIS — E11.9 TYPE 2 DIABETES MELLITUS WITHOUT COMPLICATION: ICD-10-CM

## 2021-06-04 DIAGNOSIS — R80.9 TYPE 1 DIABETES MELLITUS WITH MICROALBUMINURIA: ICD-10-CM

## 2021-06-04 DIAGNOSIS — E10.29 TYPE 1 DIABETES MELLITUS WITH MICROALBUMINURIA: ICD-10-CM

## 2021-06-04 RX ORDER — INSULIN GLARGINE 100 [IU]/ML
INJECTION, SOLUTION SUBCUTANEOUS
Qty: 36 SYRINGE | Refills: 0 | Status: SHIPPED | OUTPATIENT
Start: 2021-06-04 | End: 2021-06-16 | Stop reason: SDUPTHER

## 2021-06-04 RX ORDER — INSULIN GLARGINE 100 [IU]/ML
INJECTION, SOLUTION SUBCUTANEOUS
Qty: 36 SYRINGE | Refills: 2 | OUTPATIENT
Start: 2021-06-04

## 2021-06-16 ENCOUNTER — LAB VISIT (OUTPATIENT)
Dept: LAB | Facility: HOSPITAL | Age: 41
End: 2021-06-16
Attending: INTERNAL MEDICINE
Payer: COMMERCIAL

## 2021-06-16 ENCOUNTER — OFFICE VISIT (OUTPATIENT)
Dept: FAMILY MEDICINE | Facility: CLINIC | Age: 41
End: 2021-06-16
Payer: COMMERCIAL

## 2021-06-16 VITALS
DIASTOLIC BLOOD PRESSURE: 80 MMHG | HEIGHT: 69 IN | OXYGEN SATURATION: 97 % | SYSTOLIC BLOOD PRESSURE: 120 MMHG | BODY MASS INDEX: 25.8 KG/M2 | HEART RATE: 80 BPM | TEMPERATURE: 98 F | RESPIRATION RATE: 16 BRPM | WEIGHT: 174.19 LBS

## 2021-06-16 DIAGNOSIS — E10.29 TYPE 1 DIABETES MELLITUS WITH MICROALBUMINURIA: ICD-10-CM

## 2021-06-16 DIAGNOSIS — E11.9 TYPE 2 DIABETES MELLITUS WITHOUT COMPLICATION: ICD-10-CM

## 2021-06-16 DIAGNOSIS — R80.9 TYPE 1 DIABETES MELLITUS WITH MICROALBUMINURIA: ICD-10-CM

## 2021-06-16 DIAGNOSIS — J30.9 ALLERGIC SINUSITIS: ICD-10-CM

## 2021-06-16 LAB
ALBUMIN SERPL BCP-MCNC: 4.3 G/DL (ref 3.5–5.2)
ALP SERPL-CCNC: 120 U/L (ref 55–135)
ALT SERPL W/O P-5'-P-CCNC: 40 U/L (ref 10–44)
ANION GAP SERPL CALC-SCNC: 7 MMOL/L (ref 8–16)
AST SERPL-CCNC: 23 U/L (ref 10–40)
BASOPHILS # BLD AUTO: 0.06 K/UL (ref 0–0.2)
BASOPHILS NFR BLD: 1.1 % (ref 0–1.9)
BILIRUB SERPL-MCNC: 0.7 MG/DL (ref 0.1–1)
BUN SERPL-MCNC: 12 MG/DL (ref 6–20)
CALCIUM SERPL-MCNC: 9.6 MG/DL (ref 8.7–10.5)
CHLORIDE SERPL-SCNC: 106 MMOL/L (ref 95–110)
CHOLEST SERPL-MCNC: 190 MG/DL (ref 120–199)
CHOLEST/HDLC SERPL: 3.8 {RATIO} (ref 2–5)
CO2 SERPL-SCNC: 30 MMOL/L (ref 23–29)
CREAT SERPL-MCNC: 0.8 MG/DL (ref 0.5–1.4)
DIFFERENTIAL METHOD: NORMAL
EOSINOPHIL # BLD AUTO: 0.1 K/UL (ref 0–0.5)
EOSINOPHIL NFR BLD: 1.5 % (ref 0–8)
ERYTHROCYTE [DISTWIDTH] IN BLOOD BY AUTOMATED COUNT: 13 % (ref 11.5–14.5)
EST. GFR  (AFRICAN AMERICAN): >60 ML/MIN/1.73 M^2
EST. GFR  (NON AFRICAN AMERICAN): >60 ML/MIN/1.73 M^2
ESTIMATED AVG GLUCOSE: 232 MG/DL (ref 68–131)
GLUCOSE SERPL-MCNC: 73 MG/DL (ref 70–110)
HBA1C MFR BLD: 9.7 % (ref 4–5.6)
HCT VFR BLD AUTO: 49.7 % (ref 40–54)
HDLC SERPL-MCNC: 50 MG/DL (ref 40–75)
HDLC SERPL: 26.3 % (ref 20–50)
HGB BLD-MCNC: 16.4 G/DL (ref 14–18)
IMM GRANULOCYTES # BLD AUTO: 0.02 K/UL (ref 0–0.04)
IMM GRANULOCYTES NFR BLD AUTO: 0.4 % (ref 0–0.5)
LDLC SERPL CALC-MCNC: 123.4 MG/DL (ref 63–159)
LYMPHOCYTES # BLD AUTO: 1.2 K/UL (ref 1–4.8)
LYMPHOCYTES NFR BLD: 21.9 % (ref 18–48)
MCH RBC QN AUTO: 30.4 PG (ref 27–31)
MCHC RBC AUTO-ENTMCNC: 33 G/DL (ref 32–36)
MCV RBC AUTO: 92 FL (ref 82–98)
MONOCYTES # BLD AUTO: 0.6 K/UL (ref 0.3–1)
MONOCYTES NFR BLD: 10.1 % (ref 4–15)
NEUTROPHILS # BLD AUTO: 3.6 K/UL (ref 1.8–7.7)
NEUTROPHILS NFR BLD: 65 % (ref 38–73)
NONHDLC SERPL-MCNC: 140 MG/DL
NRBC BLD-RTO: 0 /100 WBC
PLATELET # BLD AUTO: 232 K/UL (ref 150–450)
PMV BLD AUTO: 11.2 FL (ref 9.2–12.9)
POTASSIUM SERPL-SCNC: 4.1 MMOL/L (ref 3.5–5.1)
PROT SERPL-MCNC: 7.5 G/DL (ref 6–8.4)
RBC # BLD AUTO: 5.39 M/UL (ref 4.6–6.2)
SODIUM SERPL-SCNC: 143 MMOL/L (ref 136–145)
TRIGL SERPL-MCNC: 83 MG/DL (ref 30–150)
TSH SERPL DL<=0.005 MIU/L-ACNC: 1.22 UIU/ML (ref 0.4–4)
WBC # BLD AUTO: 5.47 K/UL (ref 3.9–12.7)

## 2021-06-16 PROCEDURE — 80053 COMPREHEN METABOLIC PANEL: CPT | Performed by: INTERNAL MEDICINE

## 2021-06-16 PROCEDURE — 85025 COMPLETE CBC W/AUTO DIFF WBC: CPT | Performed by: INTERNAL MEDICINE

## 2021-06-16 PROCEDURE — 99214 PR OFFICE/OUTPT VISIT, EST, LEVL IV, 30-39 MIN: ICD-10-PCS | Mod: S$GLB,,, | Performed by: INTERNAL MEDICINE

## 2021-06-16 PROCEDURE — 83036 HEMOGLOBIN GLYCOSYLATED A1C: CPT | Performed by: INTERNAL MEDICINE

## 2021-06-16 PROCEDURE — 84443 ASSAY THYROID STIM HORMONE: CPT | Performed by: INTERNAL MEDICINE

## 2021-06-16 PROCEDURE — 99999 PR PBB SHADOW E&M-EST. PATIENT-LVL III: ICD-10-PCS | Mod: PBBFAC,,, | Performed by: INTERNAL MEDICINE

## 2021-06-16 PROCEDURE — 99999 PR PBB SHADOW E&M-EST. PATIENT-LVL III: CPT | Mod: PBBFAC,,, | Performed by: INTERNAL MEDICINE

## 2021-06-16 PROCEDURE — 99214 OFFICE O/P EST MOD 30 MIN: CPT | Mod: S$GLB,,, | Performed by: INTERNAL MEDICINE

## 2021-06-16 PROCEDURE — 80061 LIPID PANEL: CPT | Performed by: INTERNAL MEDICINE

## 2021-06-16 RX ORDER — PEN NEEDLE, DIABETIC 30 GX3/16"
NEEDLE, DISPOSABLE MISCELLANEOUS
Qty: 30 EACH | Refills: 12 | Status: SHIPPED | OUTPATIENT
Start: 2021-06-16 | End: 2023-06-02 | Stop reason: SDUPTHER

## 2021-06-16 RX ORDER — LEVOCETIRIZINE DIHYDROCHLORIDE 5 MG/1
5 TABLET, FILM COATED ORAL NIGHTLY
Qty: 90 TABLET | Refills: 3 | Status: SHIPPED | OUTPATIENT
Start: 2021-06-16 | End: 2022-05-23 | Stop reason: SDUPTHER

## 2021-06-16 RX ORDER — LANCETS
EACH MISCELLANEOUS
Qty: 400 EACH | Refills: 3 | Status: SHIPPED | OUTPATIENT
Start: 2021-06-16

## 2021-06-16 RX ORDER — INSULIN GLARGINE 100 [IU]/ML
INJECTION, SOLUTION SUBCUTANEOUS
Qty: 36 SYRINGE | Refills: 0 | Status: SHIPPED | OUTPATIENT
Start: 2021-06-16 | End: 2022-05-23 | Stop reason: SDUPTHER

## 2021-06-16 RX ORDER — AZELASTINE 1 MG/ML
1 SPRAY, METERED NASAL 2 TIMES DAILY
Qty: 30 ML | Refills: 3 | Status: SHIPPED | OUTPATIENT
Start: 2021-06-16 | End: 2021-09-13

## 2021-06-16 RX ORDER — INSULIN ASPART 100 [IU]/ML
INJECTION, SOLUTION INTRAVENOUS; SUBCUTANEOUS
Qty: 15 SYRINGE | Refills: 1 | Status: SHIPPED | OUTPATIENT
Start: 2021-06-16 | End: 2022-05-23 | Stop reason: SDUPTHER

## 2021-12-15 ENCOUNTER — PATIENT MESSAGE (OUTPATIENT)
Dept: ADMINISTRATIVE | Facility: HOSPITAL | Age: 41
End: 2021-12-15
Payer: COMMERCIAL

## 2022-01-19 ENCOUNTER — OFFICE VISIT (OUTPATIENT)
Dept: URGENT CARE | Facility: CLINIC | Age: 42
End: 2022-01-19
Payer: COMMERCIAL

## 2022-01-19 VITALS
WEIGHT: 170 LBS | HEIGHT: 70 IN | HEART RATE: 101 BPM | TEMPERATURE: 98 F | SYSTOLIC BLOOD PRESSURE: 163 MMHG | DIASTOLIC BLOOD PRESSURE: 83 MMHG | OXYGEN SATURATION: 98 % | RESPIRATION RATE: 18 BRPM | BODY MASS INDEX: 24.34 KG/M2

## 2022-01-19 DIAGNOSIS — D21.9 NONOSSIFYING FIBROMA: ICD-10-CM

## 2022-01-19 DIAGNOSIS — R11.0 NAUSEA: ICD-10-CM

## 2022-01-19 DIAGNOSIS — S81.811A LACERATION OF RIGHT LOWER LEG, INITIAL ENCOUNTER: Primary | ICD-10-CM

## 2022-01-19 PROCEDURE — 99214 PR OFFICE/OUTPT VISIT, EST, LEVL IV, 30-39 MIN: ICD-10-PCS | Mod: 25,S$GLB,, | Performed by: FAMILY MEDICINE

## 2022-01-19 PROCEDURE — 73590 X-RAY EXAM OF LOWER LEG: CPT | Mod: RT,S$GLB,, | Performed by: RADIOLOGY

## 2022-01-19 PROCEDURE — 99214 OFFICE O/P EST MOD 30 MIN: CPT | Mod: 25,S$GLB,, | Performed by: FAMILY MEDICINE

## 2022-01-19 PROCEDURE — 90471 IMMUNIZATION ADMIN: CPT | Mod: S$GLB,,, | Performed by: FAMILY MEDICINE

## 2022-01-19 PROCEDURE — 90471 TDAP VACCINE GREATER THAN OR EQUAL TO 7YO IM: ICD-10-PCS | Mod: S$GLB,,, | Performed by: FAMILY MEDICINE

## 2022-01-19 PROCEDURE — 90715 TDAP VACCINE GREATER THAN OR EQUAL TO 7YO IM: ICD-10-PCS | Mod: S$GLB,,, | Performed by: FAMILY MEDICINE

## 2022-01-19 PROCEDURE — 73590 XR TIBIA FIBULA 2 VIEW RIGHT: ICD-10-PCS | Mod: RT,S$GLB,, | Performed by: RADIOLOGY

## 2022-01-19 PROCEDURE — 12032 INTMD RPR S/A/T/EXT 2.6-7.5: CPT | Mod: S$GLB,,, | Performed by: FAMILY MEDICINE

## 2022-01-19 PROCEDURE — 90715 TDAP VACCINE 7 YRS/> IM: CPT | Mod: S$GLB,,, | Performed by: FAMILY MEDICINE

## 2022-01-19 PROCEDURE — S0119 ONDANSETRON 4 MG: HCPCS | Mod: S$GLB,,, | Performed by: FAMILY MEDICINE

## 2022-01-19 PROCEDURE — 12032 LACERATION REPAIR: ICD-10-PCS | Mod: S$GLB,,, | Performed by: FAMILY MEDICINE

## 2022-01-19 PROCEDURE — S0119 PR ONDANSETRON, ORAL, 4MG: ICD-10-PCS | Mod: S$GLB,,, | Performed by: FAMILY MEDICINE

## 2022-01-19 RX ORDER — ONDANSETRON 4 MG/1
4 TABLET, ORALLY DISINTEGRATING ORAL
Status: COMPLETED | OUTPATIENT
Start: 2022-01-19 | End: 2022-01-19

## 2022-01-19 RX ORDER — MUPIROCIN 20 MG/G
OINTMENT TOPICAL 3 TIMES DAILY
Qty: 2 G | Refills: 0 | Status: SHIPPED | OUTPATIENT
Start: 2022-01-19 | End: 2022-01-26

## 2022-01-19 RX ORDER — CEPHALEXIN 500 MG/1
500 CAPSULE ORAL EVERY 6 HOURS
Qty: 20 CAPSULE | Refills: 0 | Status: SHIPPED | OUTPATIENT
Start: 2022-01-19 | End: 2022-01-24

## 2022-01-19 RX ADMIN — ONDANSETRON 4 MG: 4 TABLET, ORALLY DISINTEGRATING ORAL at 07:01

## 2022-01-20 NOTE — PROCEDURES
Laceration Repair    Date/Time: 2022 7:00 PM  Performed by: Megan Gaines NP  Authorized by: Megan Gaines NP   Consent Done: Yes  Consent: Verbal consent obtained.  Risks and benefits: risks, benefits and alternatives were discussed  Consent given by: patient  Patient understanding: patient states understanding of the procedure being performed  Imaging studies: imaging studies available  Patient identity confirmed: , verbally with patient and name  Body area: lower extremity  Location details: right lower leg  Laceration length: 5 cm  Foreign bodies: no foreign bodies  Tendon involvement: none  Nerve involvement: none  Vascular damage: no  Anesthesia: local infiltration    Anesthesia:  Local Anesthetic: lidocaine 1% without epinephrine  Anesthetic total: 5 mL    Patient sedated: no  Preparation: Patient was prepped and draped in the usual sterile fashion.  Irrigation solution: saline  Irrigation method: syringe  Amount of cleaning: extensive  Debridement: none  Degree of undermining: none  Skin closure: Steri-Strips (4-0 ethilon)  Subcutaneous closure: 3-0 Chromic gut  Number of sutures: 4 (1 chromic, 3 ethilon)  Technique: complex  Approximation: loose  Approximation difficulty: complex  Dressing: Steri-Strips, antibiotic ointment and non-stick sterile dressing  Patient tolerance: Patient tolerated the procedure well with no immediate complications  Comments: Patient was experiencing nausea while I was administering the lidocaine, he was provided with Zofran.  Dr. Holder assisted in procedure. 3-0 chromic gut placed by Dr. Holder.

## 2022-01-20 NOTE — PROGRESS NOTES
"Subjective:       Patient ID: Jay Meyer is a 41 y.o. male.    Vitals:  height is 5' 10" (1.778 m) and weight is 77.1 kg (170 lb). His tympanic temperature is 98.4 °F (36.9 °C). His blood pressure is 163/83 (abnormal) and his pulse is 101. His respiration is 18 and oxygen saturation is 98%.     Chief Complaint: Laceration (Right lower leg - )    Pt does not know what he cut his right leg on but said he noticed it about 1 hour ago. He has an avulsion type laceration on his right lower leg. He did not clean wound and does not know if he is UTD on tetanus.  Reports about 1 hour ago he noticed he was bleeding to his right lower leg, he reports he cut his leg on something metal at a gas station about 1 hour ago.  He reports a history of diabetes and last A1c 10 over the summer.    Laceration   The incident occurred 1 to 3 hours ago (Around 06:00pm today). The laceration is located on the right leg. The laceration is 5 cm in size. The laceration mechanism is unknown.The pain is at a severity of 4/10. The pain is moderate. The pain has been constant since onset. He reports no foreign bodies present. His tetanus status is unknown.       Gastrointestinal: Positive for nausea.   Musculoskeletal: Positive for pain and trauma.   Skin: Positive for laceration and avulsion.       Objective:      Physical Exam   Constitutional: He is oriented to person, place, and time. He appears well-developed and well-nourished.  Non-toxic appearance. He does not appear ill. No distress.   HENT:   Head: Normocephalic and atraumatic.   Ears:   Right Ear: External ear normal.   Left Ear: External ear normal.   Nose: Nose normal.   Mouth/Throat: Oropharynx is clear and moist.   Eyes: EOM and lids are normal.   Neck: Trachea normal and phonation normal. Neck supple.   Musculoskeletal: Normal range of motion.         General: Normal range of motion.      Right lower leg: He exhibits laceration.   Neurological: He is alert and oriented to " person, place, and time.   Skin: Skin is warm, dry, intact and not diaphoretic. Lacerations - lower ext.:  right lower leg  Psychiatric: He has a normal mood and affect. His speech is normal and behavior is normal. Judgment and thought content normal. Cognition and memory  Nursing note and vitals reviewed.              Assessment:       1. Laceration of right lower leg, initial encounter    2. Nausea    3. Nonossifying fibroma        7:32 PM irrigated with 500cc of sterile water  8:24 PM wound care with mupiricon, non stick and coban    Laceration Repair     Date/Time: 2022 7:00 PM  Performed by: Megan Gaines NP  Authorized by: Megan Gaines NP   Consent Done: Yes  Consent: Verbal consent obtained.  Risks and benefits: risks, benefits and alternatives were discussed  Consent given by: patient  Patient understanding: patient states understanding of the procedure being performed  Imaging studies: imaging studies available  Patient identity confirmed: , verbally with patient and name  Body area: lower extremity  Location details: right lower leg  Laceration length: 5 cm  Foreign bodies: no foreign bodies  Tendon involvement: none  Nerve involvement: none  Vascular damage: no  Anesthesia: local infiltration   Anesthesia:  Local Anesthetic: lidocaine 1% without epinephrine  Anesthetic total: 5 mL    Patient sedated: no  Preparation: Patient was prepped and draped in the usual sterile fashion.  Irrigation solution: saline  Irrigation method: syringe  Amount of cleaning: extensive  Debridement: none  Degree of undermining: none  Skin closure: Steri-Strips (4-0 ethilon)  Subcutaneous closure: 3-0 Chromic gut  Number of sutures: 4 (1 chromic, 3 ethilon)  Technique: complex  Approximation: loose  Approximation difficulty: complex  Dressing: Steri-Strips, antibiotic ointment and non-stick sterile dressing  Patient tolerance: Patient tolerated the procedure well with no immediate complications  Comments:  Patient was experiencing nausea while I was administering the lidocaine, he was provided with Zofran. Dr. Holder assisted in procedure. 3-0 chromic gut placed by Dr. Holder    XR TIBIA FIBULA 2 VIEW RIGHT    Result Date: 1/19/2022  EXAMINATION: XR TIBIA FIBULA 2 VIEW RIGHT CLINICAL HISTORY: Laceration without foreign body, right lower leg, initial encounter TECHNIQUE: AP and lateral views of the right tibia and fibula were performed. COMPARISON: None. FINDINGS: Multiple images of the right tibia and fibula reveals no obvious evidence of an acute fracture injury.  The patient has a history of a laceration to the calf region.  There is no obvious soft tissue defect appreciated.  There are no foreign bodies noted. There is irregularity of involving the cortex and medullary portion of the distal aspect of the tibia.  This appears to be a nonossifying fibroma which is a normal variant.  There does not appear to be any other remarkable findings appreciated.  The knee joint and ankle joint appear to be within normal limits.     No obvious evidence of a foreign body. No apparent evidence of acute fracture injury. Irregularity involving the medullary region and cortex of the distal tibia.  This is considered to be a normal variant. Electronically signed by: Jimy Best Date:    01/19/2022 Time:    20:23    Plan:       Last A1c in June, stressed follow-up with PCP.  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 Strict precautions given to patient to monitor for worsening signs and symptoms. Advised to follow up with PCP or specialist.    Explained side effects of medications prescribed with patient and informed him/her to discontinue use if he/she has any side effects and to inform UC or PCP if this occurs. All questions answered. Strict ED verses clinic return precautions stressed and given in depth.  Advised if symptoms worsens of fail to improve he/she should go to the Emergency Room. Discharge and follow-up instructions given verbally/printed with the patient who expressed understanding and willingness to comply with my recommendations. Patient voiced understanding and in agreement with current treatment plan. Patient exits the exam room in no acute distress. Conversant and engaged during discharge discussion, verbalized understanding.      30 minutes spent on patient's encounter, including chart review, time spent with patient, and chart completion.    Laceration of right lower leg, initial encounter  -     Tdap Vaccine  -     XR TIBIA FIBULA 2 VIEW RIGHT; Future; Expected date: 01/19/2022  -     cephALEXin (KEFLEX) 500 MG capsule; Take 1 capsule (500 mg total) by mouth every 6 (six) hours. for 5 days  Dispense: 20 capsule; Refill: 0  -     mupirocin (BACTROBAN) 2 % ointment; Apply topically 3 (three) times daily. for 7 days  Dispense: 2 g; Refill: 0  -     Laceration Repair    Nausea  -     ondansetron disintegrating tablet 4 mg    Nonossifying fibroma  Comments:  pt reports aware of this history, noted in previous xray 2019                 Patient Instructions   General Discharge Instructions   PLEASE READ YOUR DISCHARGE INSTRUCTIONS ENTIRELY AS IT CONTAINS IMPORTANT INFORMATION.  If you were prescribed a narcotic or controlled medication, do not drive or operate heavy equipment or machinery while taking these medications.  If you were prescribed antibiotics, please take them to completion.  You must understand that you've received an Urgent Care treatment only and that you may be released before all your medical problems are known or treated. You, the patient, will arrange for follow up care as instructed.    OVER THE COUNTER RECOMMENDATIONS/SUGGESTIONS.    Make sure to stay well hydrated.    Use Nasal Saline to mechanically move any post nasal drip from your eustachian tube or from the back of your  throat.    Use warm salt water gargles to ease your throat pain. Warm salt water gargles as needed for sore throat- 1/2 tsp salt to 1 cup warm water, gargle as desired.    Use an antihistamine such as Claritin, Zyrtec or Allegra to dry you out.    Use pseudoephedrine (behind the counter) to decongest. Pseudoephedrine 30 mg up to 240 mg /day. It can raise your blood pressure and give you palpitations.    Use mucinex (guaifenesin) to break up mucous up to 2400mg/day to loosen any mucous.    The mucinex DM pill has a cough suppressant that can be sedating. It can be used at night to stop the tickle at the back of your throat.    You can use Mucinex D (it has guaifenesin and a high dose of pseudoephedrine) in the mornings to help decongest.    Use Afrin in each nare for no longer than 3 days, as it is addictive. It can also dry out your mucous membranes and cause elevated blood pressure. This is especially useful if you are flying.    Use Flonase 1-2 sprays/nostril per day. It is a local acting steroid nasal spray, if you develop a bloody nose, stop using the medication immediately.    Sometimes Nyquil at night is beneficial to help you get some rest, however it is sedating and it does have an antihistamine, and tylenol.    Honey is a natural cough suppressant that can be used.    Tylenol up to 4,000 mg a day is safe for short periods and can be used for body aches, pain, and fever. However in high doses and prolonged use it can cause liver irritation.    Ibuprofen is a non-steroidal anti-inflammatory that can be used for body aches, pain, and fever.However it can also cause stomach irritation if over used.     Follow up with your PCP or specialty clinic as instructed in the next 2-3 days if not improved or as needed. You can call [phone] to schedule an appointment with appropriate provider.      If you condition worsens, we recommend that you receive another evaluation at the emergency room immediately or  contact your primary medical clinic's after hours call service to discuss your concerns.      Please return here or go to the Emergency Department for any concerns or worsening condition.   You can also call [phone] to schedule an appointment with the appropriate provider.    Please return here or go to the Emergency Department for any concerns or worsening of condition.    Thank you for choosing [facility]!    Our goal in the Urgent Care is to always provide outstanding medical care. You may receive a survey by mail or e-mail in the next week regarding your experience today. We would greatly appreciate you completing and returning the survey. Your feedback provides us with a way to recognize our staff who provide very good care, and it helps us learn how to improve when your experience was below our aspiration of excellence.      We appreciate you trusting us with your medical care. We hope you feel better soon. We will be happy to take care of you for all of your future medical needs.    Sincerely,    MARK Mota    Patient Education       Laceration Repair With Stitches Discharge Instructions   About this topic   A laceration is a cut on your skin. It is most often caused by a sharp object like a knife blade, glass, or from other things with sharp edges. Sometimes, this kind of cut is shallow. Other times, it goes deep into the skin and muscles. Before the cut can be closed, it must be cleaned. Closing the wound is called a laceration repair.  Stitches are a special kind of thread and are also called sutures. They are used to close a skin cut or wound. They are often used for cuts that are deep or bleeding. Stitches work well for cuts with jagged edges, or those that have fat or muscle showing. Stitches most often makes the skin heal faster and with less scarring. The skin is sewn together with special kinds of thread. Stitches are used to fix cuts on the outside of the skin. They are  also used to fix cuts made in surgery and cuts inside the body. Stitches are also used to help control bleeding. Some stitches need to be taken out. Others melt away or dissolve on their own as the wound heals.       What care is needed at home?   · Ask your doctor what you need to do when you go home. Make sure you ask questions if you do not understand what the doctor says.  · Your wound may drain a small amount of clear yellow fluid in the first few days.  · Keep your wound clean and dry for the first 24 hours. After 24 hours, you can gently wash the wound with soap and water or take a shower.  · You may apply an antibiotic ointment to the wound 1 to 2 times each day. If you want, you can cover your wound with a bandage. You can also leave it open to air if you prefer.  · Wash your hands before and after you touch your wound or bandage.  · Avoid activities that could hurt the area of your stitches for 1 to 2 weeks. If you hurt the same part of your body again, stitches can break, and the cut can open up again.  · Do not try to take out the stitches yourself.  What follow-up care is needed?   · Your doctor may ask you to make visits to the office to check on your progress. Be sure to keep these visits.  · If you have stitches that do not dissolve on their own, you will need to have them taken out. Your doctor will often want to do this in 5 to 10 days.  What drugs may be needed?   The doctor may order drugs to:  · Help with pain  · Prevent or fight an infection  Will physical activity be limited?   · Limit your activity until your wound is fully healed. Talk to your doctor about the right amount of activity for you.  · Your doctor will tell you when it is safe to return to sports. Be sure to ask your doctor before you do any activities.  What problems could happen?   · Bleeding  · Infection  · Poor healing  · Scarring  When do I need to call the doctor?   · You have a fever of 100.4°F (38°C) or higher or  chills.  · Your wound is swollen, red, or warm.  · Your wound has thick yellow or green drainage.  · The wound opens up.  Teach Back: Helping You Understand   The Teach Back Method helps you understand the information we are giving you. After you talk with the staff, tell them in your own words what you learned. This helps to make sure the staff has described each thing clearly. It also helps to explain things that may have been confusing. Before going home, make sure you can do these:  · I can tell you about my procedure.  · I can tell you how to care for my wound.  · I can tell you if my stitches dissolve or need to be removed by the doctor.  · I can tell you what I will do if I have swelling, redness, or warmth around my wound.  Where can I learn more?   NHS Choices  https://www.nhs.uk/common-health-questions/accidents-first-aid-and-treatments/how-should-i-care-for-my-stitches/   Last Reviewed Date   2021-05-05  Consumer Information Use and Disclaimer   This information is not specific medical advice and does not replace information you receive from your health care provider. This is only a brief summary of general information. It does NOT include all information about conditions, illnesses, injuries, tests, procedures, treatments, therapies, discharge instructions or life-style choices that may apply to you. You must talk with your health care provider for complete information about your health and treatment options. This information should not be used to decide whether or not to accept your health care providers advice, instructions or recommendations. Only your health care provider has the knowledge and training to provide advice that is right for you.  Copyright   Copyright © 2021 [employer] and its affiliates and/or licensors. All rights reserved.  Patient Education       Wound Care   Why is this procedure done?   A wound is an injury to the skin that breaks the barrier to the outside. The skin  protects the inside of the body from the outside world. When the skin is damaged or broken open, the wound can become infected or bleed.  Cuts and scrapes are one type of wound. Scrapes on the surface leave deeper skin layers in place. These are often caused by friction or rubbing against a rough surface.  Another kind is a puncture wound. This comes from something like a bite or stepping on a nail. Puncture wounds are caused by a pointed or sharp object, such as a nail, needle, or tooth entering the skin. Bleeding can be very little, and the wound may be barely obvious. Bites from a human or an animal always have germs in them and need extra care.  A laceration is a cut on your skin. It is most often caused by a sharp object like a knife blade, glass, or from other things with sharp edges. If the cut is shallow, it does not need stitches.     What happens before the procedure?   Different kinds of wounds may need different types of care. This is based on how they happened and how bad they are. Your doctor will look at your wound and decide how to treat it. Some wounds are closed with strips of tape, special skin glue, stitches, or staples. Some wounds need a surgeon to do the repair. Other wounds just need to be cleaned and covered with a bandage.  · Your doctor will ask you about your health history. Talk to your doctor about:  ? All the drugs you are taking. Be sure to include all prescription and over-the-counter (OTC) drugs, and herbal supplements. Tell the doctor about any drug allergy. Bring a list of drugs you take with you.  ? Any bleeding problems. Be sure to tell your doctor if you are taking any drugs that may cause bleeding. Some of these are warfarin, rivaroxaban, apixaban, ticagrelor, clopidogrel, ketorolac, ibuprofen, naproxen, or aspirin. Certain vitamins and herbs, such as garlic and fish oil, may also add to the risk for bleeding. You may need to stop these drugs as well. Talk to your doctor about  them.  ? If you will need someone to drive you home  What happens during the procedure?   · Your wound is cleaned using a special soap.  · The doctor may give you a drug to numb the area.  · Abrasions   ? Most often, abrasions only need cleaning and a bandage.  ? The doctor may put a thin layer of antibiotic ointment on your wound and cover it with a bandage.  · Puncture Wounds   ? These wounds may or may not need to have stitches. This will depend on what has caused the wound, how bad the puncture is, and where it is located on the body.  ? The doctor may order drugs to prevent infection. This is more common with animal bites.  · Lacerations   ? These wounds may or may not need to have stitches. This will depend on what has caused the cut, how bad it is, and where it is located on the body.  ? After the cut is clean, the doctor may use special strips of tape called steri-strips to hold the edges together while the cut heals.  · The time the procedure will take depends on the size of the wound and how much cleaning is needed.  What happens after the procedure?   Most often, you will be able to go home after the procedure.  What drugs may be needed?   The doctor may order drugs to:  · Help with pain  · Prevent infection  You may need to have a tetanus shot.   What problems could happen?   · Bleeding  · Infection  · Scarring  · Poor healing  Where can I learn more?   American Academy of Pediatrics  https://www.healthychildren.org/English/health-issues/injuries-emergencies/Pages/Treating-Cuts.aspx   Better [employer]  https://www.betterhealth.[place]/health/conditionsandtreatments/skin-cuts-and-abrasions   Kids Health  http://kidshealth.org/en/teens/wounds.html?ref=search   Last Reviewed Date   2021-06-22  Consumer Information Use and Disclaimer   This information is not specific medical advice and does not replace information you receive from your health care provider. This is only a brief summary of general  information. It does NOT include all information about conditions, illnesses, injuries, tests, procedures, treatments, therapies, discharge instructions or life-style choices that may apply to you. You must talk with your health care provider for complete information about your health and treatment options. This information should not be used to decide whether or not to accept your health care providers advice, instructions or recommendations. Only your health care provider has the knowledge and training to provide advice that is right for you.  Copyright   Copyright © 2021 [employer] and its affiliates and/or licensors. All rights reserved.    Laceration Home Care Instructions    Keep your dressing on for 24 hours. Do not wet laceration for 12 hours.  After 24 hours remove the dressing and gently clean wound with soap and water at least twice daily unless more frequently soiled, then clean more frequently.    May apply topical antibiotic (avoid neosporin) to wound to promote healing.   Clean old antibiotic ointment away before new application.    DO NOT REMOVE SUTURES YOURSELF.    PLEASE RETURN HERE OR ANOTHER [facility] IN   ?Face - 5 days    ?Eyelids - 5 days (3 days for low-tension wounds and up to 7 days for high-tension wounds)    ?Neck - 5 days    ?Scalp - 7 to 10 days    ?Trunk and upper extremities - 7 days    ?Lower extremities - 8 to 10 days    ?Digits, palm, and sole - 10 to 14 days    DAYS FOR SUTURE OR STAPLE REMOVAL  Signs of infection include:  Increase in redness, increase in pain, purulent (pus) drainage. Contact clinic if infection concerns arise.   Do not apply a great deal of tension or stress to the suture line.  Keep covered when you are out and about, if the dressing becomes wet, change it immediately. Keep open to air when at home.

## 2022-01-20 NOTE — PATIENT INSTRUCTIONS
General Discharge Instructions   PLEASE READ YOUR DISCHARGE INSTRUCTIONS ENTIRELY AS IT CONTAINS IMPORTANT INFORMATION.  If you were prescribed a narcotic or controlled medication, do not drive or operate heavy equipment or machinery while taking these medications.  If you were prescribed antibiotics, please take them to completion.  You must understand that you've received an Urgent Care treatment only and that you may be released before all your medical problems are known or treated. You, the patient, will arrange for follow up care as instructed.    OVER THE COUNTER RECOMMENDATIONS/SUGGESTIONS.    Make sure to stay well hydrated.    Use Nasal Saline to mechanically move any post nasal drip from your eustachian tube or from the back of your throat.    Use warm salt water gargles to ease your throat pain. Warm salt water gargles as needed for sore throat- 1/2 tsp salt to 1 cup warm water, gargle as desired.    Use an antihistamine such as Claritin, Zyrtec or Allegra to dry you out.    Use pseudoephedrine (behind the counter) to decongest. Pseudoephedrine 30 mg up to 240 mg /day. It can raise your blood pressure and give you palpitations.    Use mucinex (guaifenesin) to break up mucous up to 2400mg/day to loosen any mucous.    The mucinex DM pill has a cough suppressant that can be sedating. It can be used at night to stop the tickle at the back of your throat.    You can use Mucinex D (it has guaifenesin and a high dose of pseudoephedrine) in the mornings to help decongest.    Use Afrin in each nare for no longer than 3 days, as it is addictive. It can also dry out your mucous membranes and cause elevated blood pressure. This is especially useful if you are flying.    Use Flonase 1-2 sprays/nostril per day. It is a local acting steroid nasal spray, if you develop a bloody nose, stop using the medication immediately.    Sometimes Nyquil at night is beneficial to help you get some rest, however it is sedating and it  does have an antihistamine, and tylenol.    Honey is a natural cough suppressant that can be used.    Tylenol up to 4,000 mg a day is safe for short periods and can be used for body aches, pain, and fever. However in high doses and prolonged use it can cause liver irritation.    Ibuprofen is a non-steroidal anti-inflammatory that can be used for body aches, pain, and fever.However it can also cause stomach irritation if over used.     Follow up with your PCP or specialty clinic as instructed in the next 2-3 days if not improved or as needed. You can call [phone] to schedule an appointment with appropriate provider.      If you condition worsens, we recommend that you receive another evaluation at the emergency room immediately or contact your primary medical clinic's after hours call service to discuss your concerns.      Please return here or go to the Emergency Department for any concerns or worsening condition.   You can also call [phone] to schedule an appointment with the appropriate provider.    Please return here or go to the Emergency Department for any concerns or worsening of condition.    Thank you for choosing [facility]!    Our goal in the Urgent Care is to always provide outstanding medical care. You may receive a survey by mail or e-mail in the next week regarding your experience today. We would greatly appreciate you completing and returning the survey. Your feedback provides us with a way to recognize our staff who provide very good care, and it helps us learn how to improve when your experience was below our aspiration of excellence.      We appreciate you trusting us with your medical care. We hope you feel better soon. We will be happy to take care of you for all of your future medical needs.    Sincerely,    MARK Mota    Patient Education       Laceration Repair With Stitches Discharge Instructions   About this topic   A laceration is a cut on your skin. It is  most often caused by a sharp object like a knife blade, glass, or from other things with sharp edges. Sometimes, this kind of cut is shallow. Other times, it goes deep into the skin and muscles. Before the cut can be closed, it must be cleaned. Closing the wound is called a laceration repair.  Stitches are a special kind of thread and are also called sutures. They are used to close a skin cut or wound. They are often used for cuts that are deep or bleeding. Stitches work well for cuts with jagged edges, or those that have fat or muscle showing. Stitches most often makes the skin heal faster and with less scarring. The skin is sewn together with special kinds of thread. Stitches are used to fix cuts on the outside of the skin. They are also used to fix cuts made in surgery and cuts inside the body. Stitches are also used to help control bleeding. Some stitches need to be taken out. Others melt away or dissolve on their own as the wound heals.       What care is needed at home?   · Ask your doctor what you need to do when you go home. Make sure you ask questions if you do not understand what the doctor says.  · Your wound may drain a small amount of clear yellow fluid in the first few days.  · Keep your wound clean and dry for the first 24 hours. After 24 hours, you can gently wash the wound with soap and water or take a shower.  · You may apply an antibiotic ointment to the wound 1 to 2 times each day. If you want, you can cover your wound with a bandage. You can also leave it open to air if you prefer.  · Wash your hands before and after you touch your wound or bandage.  · Avoid activities that could hurt the area of your stitches for 1 to 2 weeks. If you hurt the same part of your body again, stitches can break, and the cut can open up again.  · Do not try to take out the stitches yourself.  What follow-up care is needed?   · Your doctor may ask you to make visits to the office to check on your progress. Be sure to  keep these visits.  · If you have stitches that do not dissolve on their own, you will need to have them taken out. Your doctor will often want to do this in 5 to 10 days.  What drugs may be needed?   The doctor may order drugs to:  · Help with pain  · Prevent or fight an infection  Will physical activity be limited?   · Limit your activity until your wound is fully healed. Talk to your doctor about the right amount of activity for you.  · Your doctor will tell you when it is safe to return to sports. Be sure to ask your doctor before you do any activities.  What problems could happen?   · Bleeding  · Infection  · Poor healing  · Scarring  When do I need to call the doctor?   · You have a fever of 100.4°F (38°C) or higher or chills.  · Your wound is swollen, red, or warm.  · Your wound has thick yellow or green drainage.  · The wound opens up.  Teach Back: Helping You Understand   The Teach Back Method helps you understand the information we are giving you. After you talk with the staff, tell them in your own words what you learned. This helps to make sure the staff has described each thing clearly. It also helps to explain things that may have been confusing. Before going home, make sure you can do these:  · I can tell you about my procedure.  · I can tell you how to care for my wound.  · I can tell you if my stitches dissolve or need to be removed by the doctor.  · I can tell you what I will do if I have swelling, redness, or warmth around my wound.  Where can I learn more?   NHS Choices  https://www.nhs.uk/common-health-questions/accidents-first-aid-and-treatments/how-should-i-care-for-my-stitches/   Last Reviewed Date   2021-05-05  Consumer Information Use and Disclaimer   This information is not specific medical advice and does not replace information you receive from your health care provider. This is only a brief summary of general information. It does NOT include all information about conditions, illnesses,  injuries, tests, procedures, treatments, therapies, discharge instructions or life-style choices that may apply to you. You must talk with your health care provider for complete information about your health and treatment options. This information should not be used to decide whether or not to accept your health care providers advice, instructions or recommendations. Only your health care provider has the knowledge and training to provide advice that is right for you.  Copyright   Copyright © 2021 [employer]. and its affiliates and/or licensors. All rights reserved.  Patient Education       Wound Care   Why is this procedure done?   A wound is an injury to the skin that breaks the barrier to the outside. The skin protects the inside of the body from the outside world. When the skin is damaged or broken open, the wound can become infected or bleed.  Cuts and scrapes are one type of wound. Scrapes on the surface leave deeper skin layers in place. These are often caused by friction or rubbing against a rough surface.  Another kind is a puncture wound. This comes from something like a bite or stepping on a nail. Puncture wounds are caused by a pointed or sharp object, such as a nail, needle, or tooth entering the skin. Bleeding can be very little, and the wound may be barely obvious. Bites from a human or an animal always have germs in them and need extra care.  A laceration is a cut on your skin. It is most often caused by a sharp object like a knife blade, glass, or from other things with sharp edges. If the cut is shallow, it does not need stitches.     What happens before the procedure?   Different kinds of wounds may need different types of care. This is based on how they happened and how bad they are. Your doctor will look at your wound and decide how to treat it. Some wounds are closed with strips of tape, special skin glue, stitches, or staples. Some wounds need a surgeon to do the repair. Other wounds  just need to be cleaned and covered with a bandage.  · Your doctor will ask you about your health history. Talk to your doctor about:  ? All the drugs you are taking. Be sure to include all prescription and over-the-counter (OTC) drugs, and herbal supplements. Tell the doctor about any drug allergy. Bring a list of drugs you take with you.  ? Any bleeding problems. Be sure to tell your doctor if you are taking any drugs that may cause bleeding. Some of these are warfarin, rivaroxaban, apixaban, ticagrelor, clopidogrel, ketorolac, ibuprofen, naproxen, or aspirin. Certain vitamins and herbs, such as garlic and fish oil, may also add to the risk for bleeding. You may need to stop these drugs as well. Talk to your doctor about them.  ? If you will need someone to drive you home  What happens during the procedure?   · Your wound is cleaned using a special soap.  · The doctor may give you a drug to numb the area.  · Abrasions   ? Most often, abrasions only need cleaning and a bandage.  ? The doctor may put a thin layer of antibiotic ointment on your wound and cover it with a bandage.  · Puncture Wounds   ? These wounds may or may not need to have stitches. This will depend on what has caused the wound, how bad the puncture is, and where it is located on the body.  ? The doctor may order drugs to prevent infection. This is more common with animal bites.  · Lacerations   ? These wounds may or may not need to have stitches. This will depend on what has caused the cut, how bad it is, and where it is located on the body.  ? After the cut is clean, the doctor may use special strips of tape called steri-strips to hold the edges together while the cut heals.  · The time the procedure will take depends on the size of the wound and how much cleaning is needed.  What happens after the procedure?   Most often, you will be able to go home after the procedure.  What drugs may be needed?   The doctor may order drugs to:  · Help with  pain  · Prevent infection  You may need to have a tetanus shot.   What problems could happen?   · Bleeding  · Infection  · Scarring  · Poor healing  Where can I learn more?   American Academy of Pediatrics  https://www.healthychildren.org/English/health-issues/injuries-emergencies/Pages/Treating-Cuts.aspx   Site Tour Channel  https://www.Gradem[employer].comhealth.[place].gov.[place]/health/conditionsandtreatments/skin-cuts-and-abrasions   Kids Health  http://kidshealth.org/en/teens/wounds.html?ref=search   Last Reviewed Date   2021-06-22  Consumer Information Use and Disclaimer   This information is not specific medical advice and does not replace information you receive from your health care provider. This is only a brief summary of general information. It does NOT include all information about conditions, illnesses, injuries, tests, procedures, treatments, therapies, discharge instructions or life-style choices that may apply to you. You must talk with your health care provider for complete information about your health and treatment options. This information should not be used to decide whether or not to accept your health care providers advice, instructions or recommendations. Only your health care provider has the knowledge and training to provide advice that is right for you.  Copyright   Copyright © 2021 [employer], [employer]. and its affiliates and/or licensors. All rights reserved.    Laceration Home Care Instructions    Keep your dressing on for 24 hours. Do not wet laceration for 12 hours.  After 24 hours remove the dressing and gently clean wound with soap and water at least twice daily unless more frequently soiled, then clean more frequently.    May apply topical antibiotic (avoid neosporin) to wound to promote healing.   Clean old antibiotic ointment away before new application.    DO NOT REMOVE SUTURES YOURSELF.    PLEASE RETURN HERE OR ANOTHER [facility] IN   ?Face - 5 days    ?Eyelids - 5 days (3 days for  low-tension wounds and up to 7 days for high-tension wounds)    ?Neck - 5 days    ?Scalp - 7 to 10 days    ?Trunk and upper extremities - 7 days    ?Lower extremities - 8 to 10 days    ?Digits, palm, and sole - 10 to 14 days    DAYS FOR SUTURE OR STAPLE REMOVAL  Signs of infection include:  Increase in redness, increase in pain, purulent (pus) drainage. Contact clinic if infection concerns arise.   Do not apply a great deal of tension or stress to the suture line.  Keep covered when you are out and about, if the dressing becomes wet, change it immediately. Keep open to air when at home.

## 2022-01-22 ENCOUNTER — CLINICAL SUPPORT (OUTPATIENT)
Dept: URGENT CARE | Facility: CLINIC | Age: 42
End: 2022-01-22
Payer: COMMERCIAL

## 2022-01-22 VITALS
SYSTOLIC BLOOD PRESSURE: 133 MMHG | WEIGHT: 170 LBS | TEMPERATURE: 98 F | RESPIRATION RATE: 17 BRPM | HEART RATE: 82 BPM | DIASTOLIC BLOOD PRESSURE: 78 MMHG | BODY MASS INDEX: 24.34 KG/M2 | OXYGEN SATURATION: 95 % | HEIGHT: 70 IN

## 2022-01-22 DIAGNOSIS — S81.811D LACERATION OF RIGHT LOWER LEG, SUBSEQUENT ENCOUNTER: Primary | ICD-10-CM

## 2022-01-22 PROCEDURE — 99499 NO LOS: ICD-10-PCS | Mod: S$GLB,,, | Performed by: NURSE PRACTITIONER

## 2022-01-22 PROCEDURE — 99499 UNLISTED E&M SERVICE: CPT | Mod: S$GLB,,, | Performed by: NURSE PRACTITIONER

## 2022-01-22 NOTE — PROGRESS NOTES
"Subjective:       Patient ID: Jay Meyer is a 41 y.o. male.    Vitals:  height is 5' 10" (1.778 m) and weight is 77.1 kg (170 lb). His oral temperature is 98.2 °F (36.8 °C). His blood pressure is 133/78 and his pulse is 82. His respiration is 17 and oxygen saturation is 95%.     Chief Complaint: Wound Check    Patient is here for right lower leg wound check.    Wound Check  He was originally treated 2 to 3 days ago. Previous treatment included oral antibiotics. His temperature was unmeasured prior to arrival. There has been bloody discharge from the wound. There is no redness present. The swelling has improved. The pain has improved. He has no difficulty moving the affected extremity or digit.     ROS    Objective:      Physical Exam   Cardiovascular: Normal rate and normal pulses.   Pulmonary/Chest: Effort normal.   Abdominal: Normal appearance.   Musculoskeletal:      Right lower leg: He exhibits laceration.   Neurological: He is alert.   Skin: Skin is warm and dry. Lacerations - lower ext.:  right lower leg       Nursing note and vitals reviewed.        Assessment:       1. Laceration of right lower leg, subsequent encounter          Plan:       Laceration appears well and in initial stages of healing.  No surrounding edema or erythema.  There is some TTP on exam.  Sutures in place.  Reapplied additional Steri-Strips as patient had antibiotic ointment on laceration and Steri-Strips came off with dressing change.  Patient will begin cleaning with soap and water daily.  In an additional 2 more days he will allow the wound to be opened to air and scabbed.  We discussed that if he has any signs of infection to return to this location for reassessment.  He will return between day 10 today 14 for wound inspection and suture removal.    Laceration of right lower leg, subsequent encounter                   "

## 2022-01-22 NOTE — PATIENT INSTRUCTIONS
Patient Education       Laceration Infection Discharge Instructions   About this topic   A laceration is a cut or tear in your body on your skin, muscle, or tissue. It can occur anywhere there is soft tissue. The doctor cleaned out your cut and then closed it with one of these ways.  · Special skin glue that holds the wound edges together  · Strips of special adhesive tape, called steri-strips  · Use a special type of thread called stitches to close some wounds. Some stitches need to be taken out after the wound heals. Others melt away or dissolve as the wound heals.  · Special metal staples  · A combination of these methods  You may develop an infection even though your cut was cleaned. The skin around your cut may be red, raised, and hurt when you touch it. Pus may develop deep in the infection. The doctor may need to drain the pus.       What care is needed at home?   · Ask your doctor what you need to do when you go home. Make sure you understand everything the doctor says. This way you will know what you need to do.  · Talk to your doctor about how to care for your cut site. Ask your doctor about:  ? When you should change your bandages  ? When you may take a bath or shower  ? If you need to be careful with lifting things over 10 pounds (4.5 kg)  ? When you may go back to your normal activities like work or driving  · Be sure to wash your hands before and after touching your wound or dressing.  What follow-up care is needed?   Your doctor may ask you to make visits to the office to check on your progress. Be sure to keep these visits. Your doctor may give you a tetanus shot if needed.  What drugs may be needed?   The doctor may order drugs to:  · Help with pain  · Prevent or fight an infection  Will physical activity be limited?   · You may have to limit your activity. Talk to your doctor about the right amount of activity for you.  What problems could happen?   · Infection of the bloodstream, bone, other body  organs, or nearby tissue  When do I need to call the doctor?   · Signs of infection. These include a fever of 100.4°F (38°C) or higher, chills, wound that will not heal.  · Wound opens up  · More redness, drainage, warmth, or odor at the cut site  · You are not feeling better in 2 to 3 days or you are feeling worse  Teach Back: Helping You Understand   The Teach Back Method helps you understand the information we are giving you. After you talk with the staff, tell them in your own words what you learned. This helps to make sure the staff has described each thing clearly. It also helps to explain things that may have been confusing. Before going home, make sure you can do these:  · I can tell you about my condition.  · I can tell you how to care for my cut site.  · I can tell you what I will do if I have a fever, chills, or my wound will not heal.  Where can I learn more?   American Association for Clinical Chemistry  https://labtestsonline.org/conditions/wound-and-skin-infections   Last Reviewed Date   2021-05-06  Consumer Information Use and Disclaimer   This information is not specific medical advice and does not replace information you receive from your health care provider. This is only a brief summary of general information. It does NOT include all information about conditions, illnesses, injuries, tests, procedures, treatments, therapies, discharge instructions or life-style choices that may apply to you. You must talk with your health care provider for complete information about your health and treatment options. This information should not be used to decide whether or not to accept your health care providers advice, instructions or recommendations. Only your health care provider has the knowledge and training to provide advice that is right for you.  Copyright   Copyright © 2021 [employer], [employer]. and its affiliates and/or licensors. All rights reserved.

## 2022-01-25 ENCOUNTER — TELEPHONE (OUTPATIENT)
Dept: URGENT CARE | Facility: CLINIC | Age: 42
End: 2022-01-25
Payer: COMMERCIAL

## 2022-01-26 NOTE — TELEPHONE ENCOUNTER
Courtesy call to check on pt, he reports he is doing well, denies any f/c, reports wound healing well. He did present to  1/22 for wound recheck as discussed, advised for him to fu with any future questions or concerns, he verbalized understanding.

## 2022-02-02 ENCOUNTER — CLINICAL SUPPORT (OUTPATIENT)
Dept: URGENT CARE | Facility: CLINIC | Age: 42
End: 2022-02-02
Payer: COMMERCIAL

## 2022-02-02 VITALS
BODY MASS INDEX: 24.34 KG/M2 | HEIGHT: 70 IN | TEMPERATURE: 99 F | SYSTOLIC BLOOD PRESSURE: 125 MMHG | WEIGHT: 170 LBS | RESPIRATION RATE: 16 BRPM | DIASTOLIC BLOOD PRESSURE: 80 MMHG | OXYGEN SATURATION: 98 % | HEART RATE: 80 BPM

## 2022-02-02 DIAGNOSIS — Z48.02 ENCOUNTER FOR REMOVAL OF SUTURES: Primary | ICD-10-CM

## 2022-02-02 PROCEDURE — 99499 UNLISTED E&M SERVICE: CPT | Mod: S$GLB,,, | Performed by: PHYSICIAN ASSISTANT

## 2022-02-02 PROCEDURE — 99499 NO LOS: ICD-10-PCS | Mod: S$GLB,,, | Performed by: PHYSICIAN ASSISTANT

## 2022-02-02 PROCEDURE — 99024 POSTOP FOLLOW-UP VISIT: CPT | Mod: S$GLB,,, | Performed by: PHYSICIAN ASSISTANT

## 2022-02-02 PROCEDURE — 99024 SUTURE REMOVAL: ICD-10-PCS | Mod: S$GLB,,, | Performed by: PHYSICIAN ASSISTANT

## 2022-02-02 NOTE — PATIENT INSTRUCTIONS
- Rest.  - Drink plenty of fluids.  - Take Tylenol and/or Ibuprofen as directed as needed for fever/pain.  Do not take more than the recommended dose.  - follow up with your PCP within the next 1-2 weeks as needed.  - You must understand that you have received an Urgent Care treatment only and that you may be released before all of your medical problems are known or treated.   - You, the patient, will arrange for follow up care as instructed.   - If your condition worsens or fails to improve we recommend that you receive another evaluation at the ER immediately or contact your PCP to discuss your concerns.   - You can call [phone] or [phone] to help schedule an appointment with the appropriate provider.    Patient Education       Stitches Removal   Why is this procedure done?   Stitches are also called sutures. Stitches close a skin cut or wound. There are two kinds of stitches. Some stitches need to be taken out. Others melt away or dissolve as the wound heals.  The doctor takes most stitches out within 7 to 10 days after they are put in. The amount of time that the stitches stay in depends on how bad the cut is and where the cut is on your body. Stitches that stay in too long may cause scars, and may be hard to take out. If the doctor takes the stitches out too soon, the cut may open up again.       What happens during the procedure?   The doctor cleans the skin. The doctor uses special tools to  the knot at the end of the stitch. The doctor raises it away from the skin to show a small part of the stitch. Then, the doctor uses very sharp small scissors or a sharp knife blade to cut the stitch. Then, the doctor can pull the stitch out of the skin. This is repeated until all the stitches are taken out. The doctor cleans the cut and may put small strips or a special sticky tape over the cut to protect it.  What care is needed at home?   · Leave the sticky strips over the cut until they fall off  or as directed by your doctor.  · Talk to your doctor about how to care for your wound. Ask your doctor about:  ? When you should change your bandages  ? When you may take a bath or shower  ? If you need to be careful with lifting things over 10 pounds (4.5 kg)  ? When you may go back to your normal activities like work or driving  ? How to protect your cut from the sun  · Be sure to wash your hands before and after touching your wound or dressing.  · Protect the cut site from being reinjured.  · Certain health problems like high blood sugar or long-term steroid use may affect healing. Make sure you take all drugs as ordered by your doctor.  · Do not pull on or pick at the stitches or sticky tape.  What follow-up care is needed?   Your doctor may ask you to make visits to the office to check on your progress. Be sure to keep these visits.  What problems could happen?   · Infection  · Cut site reopens  · Scarring  · Large, firm scar tissue forms. This is a keloid and is more often seen in -Americans.  When do I need to call the doctor?   · Signs of infection. These include a fever of 100.4°F (38°C) or higher, chills, wound that will not heal.  · Signs of wound infection. These include swelling, redness, warmth around the wound; too much pain when touched; yellowish, greenish, or bloody discharge; foul smell coming from the cut site; cut site opens up.  Teach Back: Helping You Understand   The Teach Back Method helps you understand the information we are giving you. After you talk with the staff, tell them in your own words what you learned. This helps to make sure the staff has described each thing clearly. It also helps to explain things that may have been confusing. Before going home, make sure you can do these:  · I can tell you about my procedure.  · I can tell you how to care for my cut site.  · I can tell you what I will do if I have a fever or swelling, redness, or drainage from my wound.  Where can I  learn more?   NHS Choices  https://www.nhs.uk/common-health-questions/accidents-first-aid-and-treatments/how-should-i-care-for-my-mercy/   Last Reviewed Date   2019-08-09  Consumer Information Use and Disclaimer   This information is not specific medical advice and does not replace information you receive from your health care provider. This is only a brief summary of general information. It does NOT include all information about conditions, illnesses, injuries, tests, procedures, treatments, therapies, discharge instructions or life-style choices that may apply to you. You must talk with your health care provider for complete information about your health and treatment options. This information should not be used to decide whether or not to accept your health care providers advice, instructions or recommendations. Only your health care provider has the knowledge and training to provide advice that is right for you.  Copyright   Copyright © 2021 [employer] and its affiliates and/or licensors. All rights reserved.

## 2022-02-02 NOTE — PROGRESS NOTES
"Subjective:       Patient ID: Jay Meyer is a 41 y.o. male.    Vitals:  height is 5' 10" (1.778 m) and weight is 77.1 kg (170 lb). His temperature is 98.8 °F (37.1 °C). His blood pressure is 125/80 and his pulse is 80. His respiration is 16 and oxygen saturation is 98%.     Chief Complaint: Suture / Staple Removal    Pt is coming in today for a suture removal that was placed 2 weeks ago on his right lower leg.  Pt states it has healed nicely, no discharge, no blood.  Finish taking the antibiotics as prescribed.  He has no complaints today.    Suture / Staple Removal  The sutures were placed 11 to 14 days ago. He tried antibiotic ointment use since the wound repair. The treatment provided significant relief. Maximum temperature: 97.8. The temperature was taken using an axillary reading. There has been no drainage from the wound. There is no redness present. There is no swelling present. There is no pain present. He has no difficulty moving the affected extremity or digit.       Constitution: Negative for chills and fever.   Cardiovascular: Negative for chest pain.   Respiratory: Negative for shortness of breath.    Musculoskeletal: Negative for pain.   Skin: Positive for laceration (well-healing with 3 sutures in place). Negative for color change.   Neurological: Negative for headaches and loss of consciousness.   Psychiatric/Behavioral: Negative for nervous/anxious. The patient is not nervous/anxious.        Objective:      Physical Exam   Constitutional: He is oriented to person, place, and time. He appears well-developed. He is cooperative.  Non-toxic appearance. He does not appear ill. No distress.   HENT:   Ears:   Right Ear: Hearing normal.   Left Ear: Hearing normal.   Eyes: Lids are normal.   Neck: Trachea normal and phonation normal. No edema present. No erythema present.   Cardiovascular: Normal rate, regular rhythm, normal heart sounds and normal pulses.   Pulmonary/Chest: Effort normal and breath " sounds normal. No respiratory distress. He has no decreased breath sounds. He has no rhonchi.   Abdominal: Normal appearance.   Musculoskeletal: Normal range of motion.         General: No tenderness or deformity. Normal range of motion.      Right lower leg: He exhibits laceration (well-healing, with 3 sutures in place). He exhibits no tenderness, no bony tenderness and no swelling.   Neurological: He is alert and oriented to person, place, and time. He exhibits normal muscle tone. Coordination normal.   Skin: Skin is warm, dry, intact, not diaphoretic, not pale and no rash. Lacerations - lower ext.:  right lower leg (well-healing, with 3 sutures in place)  Psychiatric: His speech is normal and behavior is normal. Judgment and thought content normal.   Nursing note and vitals reviewed.        Assessment:       1. Encounter for removal of sutures          Plan:         Encounter for removal of sutures  -     Suture Removal                 Patient Instructions   - Rest.  - Drink plenty of fluids.  - Take Tylenol and/or Ibuprofen as directed as needed for fever/pain.  Do not take more than the recommended dose.  - follow up with your PCP within the next 1-2 weeks as needed.  - You must understand that you have received an Urgent Care treatment only and that you may be released before all of your medical problems are known or treated.   - You, the patient, will arrange for follow up care as instructed.   - If your condition worsens or fails to improve we recommend that you receive another evaluation at the ER immediately or contact your PCP to discuss your concerns.   - You can call [phone] or [phone] to help schedule an appointment with the appropriate provider.    Patient Education       Stitches Removal   Why is this procedure done?   Stitches are also called sutures. Stitches close a skin cut or wound. There are two kinds of stitches. Some stitches need to be taken out. Others melt away or dissolve as  the wound heals.  The doctor takes most stitches out within 7 to 10 days after they are put in. The amount of time that the stitches stay in depends on how bad the cut is and where the cut is on your body. Stitches that stay in too long may cause scars, and may be hard to take out. If the doctor takes the stitches out too soon, the cut may open up again.       What happens during the procedure?   The doctor cleans the skin. The doctor uses special tools to  the knot at the end of the stitch. The doctor raises it away from the skin to show a small part of the stitch. Then, the doctor uses very sharp small scissors or a sharp knife blade to cut the stitch. Then, the doctor can pull the stitch out of the skin. This is repeated until all the stitches are taken out. The doctor cleans the cut and may put small strips or a special sticky tape over the cut to protect it.  What care is needed at home?   · Leave the sticky strips over the cut until they fall off or as directed by your doctor.  · Talk to your doctor about how to care for your wound. Ask your doctor about:  ? When you should change your bandages  ? When you may take a bath or shower  ? If you need to be careful with lifting things over 10 pounds (4.5 kg)  ? When you may go back to your normal activities like work or driving  ? How to protect your cut from the sun  · Be sure to wash your hands before and after touching your wound or dressing.  · Protect the cut site from being reinjured.  · Certain health problems like high blood sugar or long-term steroid use may affect healing. Make sure you take all drugs as ordered by your doctor.  · Do not pull on or pick at the stitches or sticky tape.  What follow-up care is needed?   Your doctor may ask you to make visits to the office to check on your progress. Be sure to keep these visits.  What problems could happen?   · Infection  · Cut site reopens  · Scarring  · Large, firm scar tissue forms. This is a  keloid and is more often seen in -Americans.  When do I need to call the doctor?   · Signs of infection. These include a fever of 100.4°F (38°C) or higher, chills, wound that will not heal.  · Signs of wound infection. These include swelling, redness, warmth around the wound; too much pain when touched; yellowish, greenish, or bloody discharge; foul smell coming from the cut site; cut site opens up.  Teach Back: Helping You Understand   The Teach Back Method helps you understand the information we are giving you. After you talk with the staff, tell them in your own words what you learned. This helps to make sure the staff has described each thing clearly. It also helps to explain things that may have been confusing. Before going home, make sure you can do these:  · I can tell you about my procedure.  · I can tell you how to care for my cut site.  · I can tell you what I will do if I have a fever or swelling, redness, or drainage from my wound.  Where can I learn more?   NHS Choices  https://www.nhs.uk/common-health-questions/accidents-first-aid-and-treatments/how-should-i-care-for-my-stitches/   Last Reviewed Date   2019-08-09  Consumer Information Use and Disclaimer   This information is not specific medical advice and does not replace information you receive from your health care provider. This is only a brief summary of general information. It does NOT include all information about conditions, illnesses, injuries, tests, procedures, treatments, therapies, discharge instructions or life-style choices that may apply to you. You must talk with your health care provider for complete information about your health and treatment options. This information should not be used to decide whether or not to accept your health care providers advice, instructions or recommendations. Only your health care provider has the knowledge and training to provide advice that is right for you.  Copyright   Copyright © 2021  [employer] and its affiliates and/or licensors. All rights reserved.

## 2022-02-02 NOTE — PROCEDURES
Suture Removal    Date/Time: 2/2/2022 1:15 PM  Location procedure was performed: [facility]  Performed by: Rebeca Holly PA-C  Authorized by: Rebeca Holly PA-C   Body area: lower extremity  Location details: right lower leg  Wound Appearance: clean, well healed, normal color, nontender and no drainage  Sutures Removed: 3  Post-removal: no dressing applied  Facility: sutures placed in this facility

## 2022-03-16 ENCOUNTER — PATIENT MESSAGE (OUTPATIENT)
Dept: ADMINISTRATIVE | Facility: HOSPITAL | Age: 42
End: 2022-03-16
Payer: COMMERCIAL

## 2022-04-13 DIAGNOSIS — E11.9 TYPE 2 DIABETES MELLITUS WITHOUT COMPLICATION: ICD-10-CM

## 2022-04-14 ENCOUNTER — PATIENT MESSAGE (OUTPATIENT)
Dept: FAMILY MEDICINE | Facility: CLINIC | Age: 42
End: 2022-04-14
Payer: COMMERCIAL

## 2022-04-18 ENCOUNTER — OFFICE VISIT (OUTPATIENT)
Dept: FAMILY MEDICINE | Facility: CLINIC | Age: 42
End: 2022-04-18
Payer: COMMERCIAL

## 2022-04-18 VITALS
HEIGHT: 69 IN | DIASTOLIC BLOOD PRESSURE: 70 MMHG | BODY MASS INDEX: 23.72 KG/M2 | SYSTOLIC BLOOD PRESSURE: 132 MMHG | OXYGEN SATURATION: 99 % | TEMPERATURE: 98 F | RESPIRATION RATE: 18 BRPM | WEIGHT: 160.13 LBS | HEART RATE: 127 BPM

## 2022-04-18 DIAGNOSIS — R11.2 NON-INTRACTABLE VOMITING WITH NAUSEA, UNSPECIFIED VOMITING TYPE: Primary | ICD-10-CM

## 2022-04-18 PROCEDURE — 99999 PR PBB SHADOW E&M-EST. PATIENT-LVL IV: ICD-10-PCS | Mod: PBBFAC,,, | Performed by: FAMILY MEDICINE

## 2022-04-18 PROCEDURE — 96372 THER/PROPH/DIAG INJ SC/IM: CPT | Mod: S$GLB,,, | Performed by: FAMILY MEDICINE

## 2022-04-18 PROCEDURE — 96372 PR INJECTION,THERAP/PROPH/DIAG2ST, IM OR SUBCUT: ICD-10-PCS | Mod: S$GLB,,, | Performed by: FAMILY MEDICINE

## 2022-04-18 PROCEDURE — 99214 PR OFFICE/OUTPT VISIT, EST, LEVL IV, 30-39 MIN: ICD-10-PCS | Mod: 25,S$GLB,, | Performed by: FAMILY MEDICINE

## 2022-04-18 PROCEDURE — 99214 OFFICE O/P EST MOD 30 MIN: CPT | Mod: 25,S$GLB,, | Performed by: FAMILY MEDICINE

## 2022-04-18 PROCEDURE — 99999 PR PBB SHADOW E&M-EST. PATIENT-LVL IV: CPT | Mod: PBBFAC,,, | Performed by: FAMILY MEDICINE

## 2022-04-18 RX ORDER — PROMETHAZINE HYDROCHLORIDE 25 MG/ML
25 INJECTION, SOLUTION INTRAMUSCULAR; INTRAVENOUS
Status: COMPLETED | OUTPATIENT
Start: 2022-04-18 | End: 2022-04-18

## 2022-04-18 RX ORDER — ONDANSETRON 8 MG/1
8 TABLET, ORALLY DISINTEGRATING ORAL EVERY 6 HOURS PRN
Qty: 30 TABLET | Refills: 0 | Status: SHIPPED | OUTPATIENT
Start: 2022-04-18 | End: 2022-04-20 | Stop reason: SDUPTHER

## 2022-04-18 RX ADMIN — PROMETHAZINE HYDROCHLORIDE 25 MG: 25 INJECTION, SOLUTION INTRAMUSCULAR; INTRAVENOUS at 09:04

## 2022-04-18 NOTE — PROGRESS NOTES
Patient received phenergan 25mg  Injection to left upper outer gluteus ,pt tolerated injection well .

## 2022-04-18 NOTE — PROGRESS NOTES
Routine Office Visit    Patient Name: Jay Meyer    : 1980  MRN: [id number]    Subjective:  Jay is a 41 y.o. male who presents today for:    1. Vomiting  Patient presenting with one day of vomiting without diarrhea.  He has been getting some stomach cramping.  He has been trying to stay hydrated with water and electrolyte replacement drink, but if he drinks too much he vomits again.  No fevers, chills or sweats.  He had eaten crawfish for two days in a row and the potatoes later.  No cream based sauces or mayonnaise containing foods.  He states he gets some abdominal cramping on occasion.  He can't eat much due to dry mouth.  They did try zofran at home, but no relief.  No one else that ate the same things has been sick.    Past Medical History  Past Medical History:   Diagnosis Date    Diabetes mellitus type I     since ; dx at 22       Past Surgical History  Past Surgical History:   Procedure Laterality Date    VASECTOMY         Family History  Family History   Problem Relation Age of Onset    Diabetes Father     Diabetes Brother     Vision loss Brother     Diabetes type II Paternal Grandfather     Heart attack Paternal Grandfather     Diabetes type II Brother        Social History  Social History     Socioeconomic History    Marital status:    Occupational History     Comment:    Tobacco Use    Smoking status: Former Smoker     Quit date: 2010     Years since quittin.2    Smokeless tobacco: Current User     Types: Snuff   Substance and Sexual Activity    Alcohol use: Yes     Alcohol/week: 2.0 standard drinks     Types: 2 Cans of beer per week    Sexual activity: Yes     Partners: Female       Current Medications  Current Outpatient Medications on File Prior to Visit   Medication Sig Dispense Refill    azelastine (ASTELIN) 137 mcg (0.1 %) nasal spray USE 1 SPRAY INTO EACH NOSTRIL TWICE A DAY 30 mL 1    blood sugar diagnostic Strp To check BG 4 times  "daily, to use with insurance preferred meter 400 each 3    insulin (BASAGLAR KWIKPEN U-100 INSULIN) glargine 100 units/mL (3mL) SubQ pen INJECT 60 UNITS INTO THE SKIN EVERY EVENING. 36 Syringe 0    insulin aspart U-100 (NOVOLOG FLEXPEN U-100 INSULIN) 100 unit/mL (3 mL) InPn pen INJECT UNDER THE SKIN 12 UNITS BEFORE BREAKFAST, 15 UNITS BEFORE LUNCH, AND 15 UNITS BEFORE DINNER 15 Syringe 1    lancets Misc To check BG 4 times daily, to use with insurance preferred meter 400 each 3    levocetirizine (XYZAL) 5 MG tablet Take 1 tablet (5 mg total) by mouth every evening. 90 tablet 3    lisinopriL (PRINIVIL,ZESTRIL) 2.5 MG tablet TAKE 1 TABLET BY MOUTH EVERY DAY 90 tablet 1    pen needle, diabetic (BD ULTRA-FINE MATA PEN NEEDLE) 32 gauge x 5/32" Ndle INJECT SC DAILY 30 each 12    TRULICITY 1.5 mg/0.5 mL pen injector INJECT 1.5 MG INTO THE SKIN EVERY 7 DAYS. 4 pen 2    blood-glucose meter kit To check BG 4 times daily, to use with insurance preferred meter 1 each 0     No current facility-administered medications on file prior to visit.       Allergies   Review of patient's allergies indicates:   Allergen Reactions    Lipitor [atorvastatin]      Hand spasms       Review of Systems (Pertinent positives)  Review of Systems   Constitutional: Positive for malaise/fatigue.   HENT: Negative.    Eyes: Negative.    Respiratory: Negative.    Cardiovascular: Negative.    Gastrointestinal: Positive for abdominal pain, nausea and vomiting. Negative for blood in stool, constipation and diarrhea.   Musculoskeletal: Negative.    Skin: Negative.          /70   Pulse (!) 127   Temp 98.1 °F (36.7 °C) (Oral)   Resp 18   Ht 5' 9" (1.753 m)   Wt 72.6 kg (160 lb 2.3 oz)   SpO2 99%   BMI 23.65 kg/m²     GENERAL APPEARANCE: in no apparent distress and well developed and well nourished  HEENT: PERRL, EOMI, Sclera clear, anicteric, Oropharynx clear, no lesions, Neck supple with midline trachea  NECK: normal, supple, no " adenopathy, thyroid normal in size  RESPIRATORY: appears well, vitals normal, no respiratory distress, acyanotic, normal RR, chest clear, no wheezing, crepitations, rhonchi, normal symmetric air entry  HEART: regular rate and rhythm, S1, S2 normal, no murmur, click, rub or gallop.    ABDOMEN: abdomen is soft without tenderness, no masses, no hernias, no organomegaly, no rebound, no guarding. Suprapubic tenderness absent. No CVA tenderness.  SKIN: no rashes, no wounds, no other lesions  PSYCH: Alert, oriented x 3, thought content appropriate, speech normal, pleasant and cooperative, good eye contact, well groomed    Assessment/Plan:  Jay Meyer is a 41 y.o. male who presents today for :    Jay was seen today for emesis.    Diagnoses and all orders for this visit:    Non-intractable vomiting with nausea, unspecified vomiting type  -     promethazine injection 25 mg  -     ondansetron (ZOFRAN-ODT) 8 MG TbDL; Take 1 tablet (8 mg total) by mouth every 6 (six) hours as needed (vomiting).      1.  Phenergan IM today  2.  zofran odt for home use  3.  Call with any concerns  4.  He and his wife told to go to the ED if vomiting persists as he is already showing signs of dehydration with having significant dry mouth      Fredy Prajapati MD

## 2022-04-20 ENCOUNTER — OFFICE VISIT (OUTPATIENT)
Dept: FAMILY MEDICINE | Facility: CLINIC | Age: 42
End: 2022-04-20
Payer: COMMERCIAL

## 2022-04-20 VITALS
HEART RATE: 125 BPM | HEIGHT: 69 IN | SYSTOLIC BLOOD PRESSURE: 132 MMHG | WEIGHT: 158.31 LBS | TEMPERATURE: 98 F | DIASTOLIC BLOOD PRESSURE: 76 MMHG | OXYGEN SATURATION: 97 % | BODY MASS INDEX: 23.45 KG/M2

## 2022-04-20 DIAGNOSIS — K21.00 GASTROESOPHAGEAL REFLUX DISEASE WITH ESOPHAGITIS, UNSPECIFIED WHETHER HEMORRHAGE: Primary | ICD-10-CM

## 2022-04-20 DIAGNOSIS — R00.0 TACHYCARDIA WITH HEART RATE 121-140 BEATS PER MINUTE: ICD-10-CM

## 2022-04-20 DIAGNOSIS — R11.2 NON-INTRACTABLE VOMITING WITH NAUSEA, UNSPECIFIED VOMITING TYPE: ICD-10-CM

## 2022-04-20 DIAGNOSIS — E86.1 HYPOVOLEMIA DEHYDRATION: ICD-10-CM

## 2022-04-20 PROCEDURE — 99213 PR OFFICE/OUTPT VISIT, EST, LEVL III, 20-29 MIN: ICD-10-PCS | Mod: 25,S$GLB,, | Performed by: STUDENT IN AN ORGANIZED HEALTH CARE EDUCATION/TRAINING PROGRAM

## 2022-04-20 PROCEDURE — 96372 PR INJECTION,THERAP/PROPH/DIAG2ST, IM OR SUBCUT: ICD-10-PCS | Mod: S$GLB,,, | Performed by: STUDENT IN AN ORGANIZED HEALTH CARE EDUCATION/TRAINING PROGRAM

## 2022-04-20 PROCEDURE — 96372 THER/PROPH/DIAG INJ SC/IM: CPT | Mod: S$GLB,,, | Performed by: STUDENT IN AN ORGANIZED HEALTH CARE EDUCATION/TRAINING PROGRAM

## 2022-04-20 PROCEDURE — 99999 PR PBB SHADOW E&M-EST. PATIENT-LVL IV: ICD-10-PCS | Mod: PBBFAC,,, | Performed by: STUDENT IN AN ORGANIZED HEALTH CARE EDUCATION/TRAINING PROGRAM

## 2022-04-20 PROCEDURE — 99213 OFFICE O/P EST LOW 20 MIN: CPT | Mod: 25,S$GLB,, | Performed by: STUDENT IN AN ORGANIZED HEALTH CARE EDUCATION/TRAINING PROGRAM

## 2022-04-20 PROCEDURE — 99999 PR PBB SHADOW E&M-EST. PATIENT-LVL IV: CPT | Mod: PBBFAC,,, | Performed by: STUDENT IN AN ORGANIZED HEALTH CARE EDUCATION/TRAINING PROGRAM

## 2022-04-20 RX ORDER — PANTOPRAZOLE SODIUM 40 MG/1
40 TABLET, DELAYED RELEASE ORAL DAILY
Qty: 30 TABLET | Refills: 1 | Status: SHIPPED | OUTPATIENT
Start: 2022-04-20 | End: 2022-04-28

## 2022-04-20 RX ORDER — PROMETHAZINE HYDROCHLORIDE 25 MG/ML
25 INJECTION, SOLUTION INTRAMUSCULAR; INTRAVENOUS
Status: COMPLETED | OUTPATIENT
Start: 2022-04-20 | End: 2022-04-20

## 2022-04-20 RX ORDER — ONDANSETRON 8 MG/1
8 TABLET, ORALLY DISINTEGRATING ORAL EVERY 6 HOURS PRN
Qty: 30 TABLET | Refills: 0 | Status: SHIPPED | OUTPATIENT
Start: 2022-04-20

## 2022-04-20 RX ADMIN — PROMETHAZINE HYDROCHLORIDE 25 MG: 25 INJECTION, SOLUTION INTRAMUSCULAR; INTRAVENOUS at 12:04

## 2022-04-20 NOTE — PROGRESS NOTES
2022    Jay Meyer  [id number]    Chief Complaint   Patient presents with    Emesis       HPI    Emesis for last 4 days  Last episode of emesis yesterday  States that it looked like liquid  Has tolerated eggs this morning  Now with abdominal soreness  Has not happened before  No recent sick contacts    Type 1 DM  200s this AM  Usually runs higher while sick  Has been adjusting insulin as needed      Negative 10 point ROS outside of HPI    Social History     Socioeconomic History    Marital status:    Occupational History     Comment:    Tobacco Use    Smoking status: Former Smoker     Quit date: 2010     Years since quittin.2    Smokeless tobacco: Current User     Types: Snuff   Substance and Sexual Activity    Alcohol use: Yes     Alcohol/week: 2.0 standard drinks     Types: 2 Cans of beer per week    Sexual activity: Yes     Partners: Female           Current Outpatient Medications:     azelastine (ASTELIN) 137 mcg (0.1 %) nasal spray, USE 1 SPRAY INTO EACH NOSTRIL TWICE A DAY, Disp: 30 mL, Rfl: 1    blood sugar diagnostic Strp, To check BG 4 times daily, to use with insurance preferred meter, Disp: 400 each, Rfl: 3    blood-glucose meter kit, To check BG 4 times daily, to use with insurance preferred meter, Disp: 1 each, Rfl: 0    insulin (BASAGLAR KWIKPEN U-100 INSULIN) glargine 100 units/mL (3mL) SubQ pen, INJECT 60 UNITS INTO THE SKIN EVERY EVENING., Disp: 36 Syringe, Rfl: 0    insulin aspart U-100 (NOVOLOG FLEXPEN U-100 INSULIN) 100 unit/mL (3 mL) InPn pen, INJECT UNDER THE SKIN 12 UNITS BEFORE BREAKFAST, 15 UNITS BEFORE LUNCH, AND 15 UNITS BEFORE DINNER, Disp: 15 Syringe, Rfl: 1    lancets Misc, To check BG 4 times daily, to use with insurance preferred meter, Disp: 400 each, Rfl: 3    levocetirizine (XYZAL) 5 MG tablet, Take 1 tablet (5 mg total) by mouth every evening., Disp: 90 tablet, Rfl: 3    lisinopriL (PRINIVIL,ZESTRIL) 2.5 MG tablet, TAKE 1  "TABLET BY MOUTH EVERY DAY, Disp: 90 tablet, Rfl: 1    ondansetron (ZOFRAN-ODT) 8 MG TbDL, Take 1 tablet (8 mg total) by mouth every 6 (six) hours as needed (vomiting)., Disp: 30 tablet, Rfl: 0    pen needle, diabetic (BD ULTRA-FINE MATA PEN NEEDLE) 32 gauge x 5/32" Ndle, INJECT SC DAILY, Disp: 30 each, Rfl: 12    TRULICITY 1.5 mg/0.5 mL pen injector, INJECT 1.5 MG INTO THE SKIN EVERY 7 DAYS., Disp: 4 pen, Rfl: 2      Physical Exam  Vitals:    04/20/22 1125   BP: 132/76   Pulse: (!) 125   Temp: 98.2 °F (36.8 °C)     Gen: tired appearing, NAD  Resp: non labored breathing, no crackles, no wheezes, CTAB  CV: tachy to 110, regular rhythm, no murmur, gallops, rubs, no LE edema  Abd: soft nontender BS present no organomegaly    1. Gastroesophageal reflux disease with esophagitis, unspecified whether hemorrhage  - pantoprazole (PROTONIX) 40 MG tablet; Take 1 tablet (40 mg total) by mouth once daily.  Dispense: 30 tablet; Refill: 1    2. Non-intractable vomiting with nausea, unspecified vomiting type  - ondansetron (ZOFRAN-ODT) 8 MG TbDL; Take 1 tablet (8 mg total) by mouth every 6 (six) hours as needed (vomiting).  Dispense: 30 tablet; Refill: 0  - promethazine injection 25 mg    3. Tachycardia with heart rate 121-140 beats per minute  Instructed to increase hydration with sugar free electrolyte replacement    4. Hypovolemia dehydration  See above      RTC if worsening or no improvement in 1-2 weeks    Lisa Marshall MD  Family Medicine      "

## 2022-04-20 NOTE — PROGRESS NOTES
Pt tolerated injection well, pt has a  present. Pt instructed to rest and not operate machinery for the remainder of the day due to Promethazine may have a sedative effect. Pt verbalized understanding.

## 2022-04-27 DIAGNOSIS — E11.9 TYPE 2 DIABETES MELLITUS WITHOUT COMPLICATION, UNSPECIFIED WHETHER LONG TERM INSULIN USE: ICD-10-CM

## 2022-05-02 ENCOUNTER — PATIENT MESSAGE (OUTPATIENT)
Dept: ADMINISTRATIVE | Facility: HOSPITAL | Age: 42
End: 2022-05-02
Payer: COMMERCIAL

## 2022-05-23 ENCOUNTER — OFFICE VISIT (OUTPATIENT)
Dept: FAMILY MEDICINE | Facility: CLINIC | Age: 42
End: 2022-05-23
Payer: COMMERCIAL

## 2022-05-23 ENCOUNTER — LAB VISIT (OUTPATIENT)
Dept: LAB | Facility: HOSPITAL | Age: 42
End: 2022-05-23
Attending: INTERNAL MEDICINE
Payer: COMMERCIAL

## 2022-05-23 VITALS
WEIGHT: 176 LBS | HEART RATE: 86 BPM | OXYGEN SATURATION: 98 % | DIASTOLIC BLOOD PRESSURE: 70 MMHG | HEIGHT: 69 IN | RESPIRATION RATE: 18 BRPM | SYSTOLIC BLOOD PRESSURE: 100 MMHG | TEMPERATURE: 98 F | BODY MASS INDEX: 26.07 KG/M2

## 2022-05-23 DIAGNOSIS — R80.9 TYPE 1 DIABETES MELLITUS WITH MICROALBUMINURIA: ICD-10-CM

## 2022-05-23 DIAGNOSIS — J30.9 ALLERGIC SINUSITIS: ICD-10-CM

## 2022-05-23 DIAGNOSIS — Z00.00 ANNUAL PHYSICAL EXAM: Primary | ICD-10-CM

## 2022-05-23 DIAGNOSIS — E10.29 TYPE 1 DIABETES MELLITUS WITH MICROALBUMINURIA: ICD-10-CM

## 2022-05-23 PROCEDURE — 99396 PR PREVENTIVE VISIT,EST,40-64: ICD-10-PCS | Mod: S$GLB,,, | Performed by: INTERNAL MEDICINE

## 2022-05-23 PROCEDURE — 99999 PR PBB SHADOW E&M-EST. PATIENT-LVL IV: CPT | Mod: PBBFAC,,, | Performed by: INTERNAL MEDICINE

## 2022-05-23 PROCEDURE — 99999 PR PBB SHADOW E&M-EST. PATIENT-LVL IV: ICD-10-PCS | Mod: PBBFAC,,, | Performed by: INTERNAL MEDICINE

## 2022-05-23 PROCEDURE — 99396 PREV VISIT EST AGE 40-64: CPT | Mod: S$GLB,,, | Performed by: INTERNAL MEDICINE

## 2022-05-23 PROCEDURE — 82570 ASSAY OF URINE CREATININE: CPT | Performed by: INTERNAL MEDICINE

## 2022-05-23 RX ORDER — INSULIN GLARGINE 100 [IU]/ML
INJECTION, SOLUTION SUBCUTANEOUS
Qty: 36 EACH | Refills: 0 | Status: SHIPPED | OUTPATIENT
Start: 2022-05-23 | End: 2022-07-29

## 2022-05-23 RX ORDER — LEVOCETIRIZINE DIHYDROCHLORIDE 5 MG/1
5 TABLET, FILM COATED ORAL NIGHTLY
Qty: 90 TABLET | Refills: 3 | Status: SHIPPED | OUTPATIENT
Start: 2022-05-23

## 2022-05-23 RX ORDER — INSULIN ASPART 100 [IU]/ML
INJECTION, SOLUTION INTRAVENOUS; SUBCUTANEOUS
Qty: 15 EACH | Refills: 1 | Status: SHIPPED | OUTPATIENT
Start: 2022-05-23 | End: 2022-07-29

## 2022-05-23 NOTE — PROGRESS NOTES
SUBJECTIVE     Chief Complaint   Patient presents with    Annual Exam       HPI  Jay Meyer is a 41 y.o. male with multiple medical diagnoses as listed in the medical history and problem list that presents for annual exam. Pt has been doing well since his last visit. He has a good appetite and eats well. He does exercise. He sleeps for ~5-6 hours nightly. Pt does not take OTC supplements. He does have any current stressors, but sleeps to de-stress.     PAST MEDICAL HISTORY:  Past Medical History:   Diagnosis Date    Diabetes mellitus type I     since ; dx at 22       PAST SURGICAL HISTORY:  Past Surgical History:   Procedure Laterality Date    VASECTOMY         SOCIAL HISTORY:  Social History     Socioeconomic History    Marital status:    Occupational History     Comment:    Tobacco Use    Smoking status: Former Smoker     Quit date: 2010     Years since quittin.3    Smokeless tobacco: Current User     Types: Snuff   Substance and Sexual Activity    Alcohol use: Yes     Alcohol/week: 2.0 standard drinks     Types: 2 Cans of beer per week    Sexual activity: Yes     Partners: Female       FAMILY HISTORY:  Family History   Problem Relation Age of Onset    Diabetes Father     Diabetes Brother     Vision loss Brother     Diabetes type II Paternal Grandfather     Heart attack Paternal Grandfather     Diabetes type II Brother        ALLERGIES AND MEDICATIONS: updated and reviewed.  Review of patient's allergies indicates:   Allergen Reactions    Lipitor [atorvastatin]      Hand spasms     Current Outpatient Medications   Medication Sig Dispense Refill    azelastine (ASTELIN) 137 mcg (0.1 %) nasal spray USE 1 SPRAY INTO EACH NOSTRIL TWICE A DAY 30 mL 1    ondansetron (ZOFRAN-ODT) 8 MG TbDL Take 1 tablet (8 mg total) by mouth every 6 (six) hours as needed (vomiting). 30 tablet 0    pantoprazole (PROTONIX) 40 MG tablet TAKE 1 TABLET BY MOUTH EVERY DAY 90 tablet 1  "   blood sugar diagnostic Strp To check BG 4 times daily, to use with insurance preferred meter 400 each 3    blood-glucose meter kit To check BG 4 times daily, to use with insurance preferred meter 1 each 0    insulin (BASAGLAR KWIKPEN U-100 INSULIN) glargine 100 units/mL (3mL) SubQ pen INJECT 60 UNITS INTO THE SKIN EVERY EVENING. 36 each 0    insulin aspart U-100 (NOVOLOG FLEXPEN U-100 INSULIN) 100 unit/mL (3 mL) InPn pen INJECT UNDER THE SKIN 15 UNITS BEFORE BREAKFAST, 15 UNITS BEFORE LUNCH, AND 15 UNITS BEFORE DINNER 15 each 1    lancets Misc To check BG 4 times daily, to use with insurance preferred meter 400 each 3    levocetirizine (XYZAL) 5 MG tablet Take 1 tablet (5 mg total) by mouth every evening. 90 tablet 3    lisinopriL (PRINIVIL,ZESTRIL) 2.5 MG tablet TAKE 1 TABLET BY MOUTH EVERY DAY (Patient not taking: Reported on 5/23/2022) 90 tablet 1    pen needle, diabetic (BD ULTRA-FINE MATA PEN NEEDLE) 32 gauge x 5/32" Ndle INJECT SC DAILY 30 each 12     No current facility-administered medications for this visit.       ROS  Review of Systems   Constitutional: Negative for chills and fever.   HENT: Negative for hearing loss and sore throat.    Eyes: Negative for visual disturbance.   Respiratory: Negative for cough and shortness of breath.    Cardiovascular: Negative for chest pain, palpitations and leg swelling.   Gastrointestinal: Negative for abdominal pain, constipation, diarrhea, nausea and vomiting.   Genitourinary: Negative for dysuria, frequency and urgency.   Musculoskeletal: Negative for arthralgias, joint swelling and myalgias.   Skin: Negative for rash and wound.   Neurological: Negative for headaches.   Psychiatric/Behavioral: Negative for agitation and confusion. The patient is not nervous/anxious.          OBJECTIVE     Physical Exam  Vitals:    05/23/22 1035   BP: 100/70   Pulse: 86   Resp: 18   Temp: 98.1 °F (36.7 °C)    Body mass index is 25.99 kg/m².  Weight: 79.8 kg (176 lb) " "  Height: 5' 9" (175.3 cm)     Physical Exam  Constitutional:       General: He is not in acute distress.     Appearance: He is well-developed.   HENT:      Head: Normocephalic and atraumatic.      Right Ear: Tympanic membrane, ear canal and external ear normal.      Left Ear: Tympanic membrane, ear canal and external ear normal.      Nose: Nose normal.   Eyes:      General: No scleral icterus.        Right eye: No discharge.         Left eye: No discharge.      Conjunctiva/sclera: Conjunctivae normal.   Neck:      Vascular: No JVD.      Trachea: No tracheal deviation.   Cardiovascular:      Rate and Rhythm: Normal rate and regular rhythm.      Heart sounds: Normal heart sounds. No murmur heard.    No friction rub. No gallop.   Pulmonary:      Effort: Pulmonary effort is normal. No respiratory distress.      Breath sounds: Normal breath sounds. No wheezing.   Abdominal:      General: Bowel sounds are normal. There is no distension.      Palpations: Abdomen is soft. There is no mass.      Tenderness: There is no abdominal tenderness. There is no guarding or rebound.   Musculoskeletal:         General: No tenderness or deformity. Normal range of motion.      Cervical back: Normal range of motion and neck supple.   Skin:     General: Skin is warm and dry.      Findings: No erythema or rash.   Neurological:      Mental Status: He is alert and oriented to person, place, and time.      Motor: No abnormal muscle tone.      Coordination: Coordination normal.   Psychiatric:         Behavior: Behavior normal.         Thought Content: Thought content normal.         Judgment: Judgment normal.           Health Maintenance       Date Due Completion Date    Low Dose Statin Never done ---    Foot Exam 06/25/2020 6/25/2019 (Done)    Override on 6/25/2019: Done    Hemoglobin A1c 09/16/2021 6/16/2021    Override on 5/17/2016: Done (8.4  dr wise)    COVID-19 Vaccine (2 - Booster for Raoul series) 09/22/2021 7/28/2021    Eye Exam " 04/06/2022 4/6/2021    Diabetes Urine Screening 06/16/2022 6/16/2021    HIV Screening 06/16/2027 (Originally 7/4/1996) ---    Lipid Panel 06/16/2022 6/16/2021    Influenza Vaccine (Season Ended) 09/01/2022 ---    TETANUS VACCINE 01/19/2032 1/19/2022            ASSESSMENT     41 y.o. male with     1. Annual physical exam    2. Type 1 diabetes mellitus with microalbuminuria    3. Allergic sinusitis        PLAN:     1. Annual physical exam  - Counseled on age appropriate medical preventative services, including age appropriate cancer screenings, over all nutritional health, need for a consistent exercise regimen and an over all push towards maintaining a vigorous and active lifestyle.  Counseled on age appropriate vaccines and discussed upcoming health care needs based on age/gender.  Spent time with patient counseling on need for a good patient/doctor relationship moving forward.  Discussed use of common OTC medications and supplements.  Discussed common dietary aids and use of caffeine and the need for good sleep hygiene and stress management.  - CBC Auto Differential; Future  - Comprehensive Metabolic Panel; Future  - Hemoglobin A1C; Future  - TSH; Future  - Lipid Panel; Future    2. Type 1 diabetes mellitus with microalbuminuria  - Check labs  - Hemoglobin A1C; Future  - Microalbumin/Creatinine Ratio, Urine; Future  - insulin aspart U-100 (NOVOLOG FLEXPEN U-100 INSULIN) 100 unit/mL (3 mL) InPn pen; INJECT UNDER THE SKIN 15 UNITS BEFORE BREAKFAST, 15 UNITS BEFORE LUNCH, AND 15 UNITS BEFORE DINNER  Dispense: 15 each; Refill: 1  - insulin (BASAGLAR KWIKPEN U-100 INSULIN) glargine 100 units/mL (3mL) SubQ pen; INJECT 60 UNITS INTO THE SKIN EVERY EVENING.  Dispense: 36 each; Refill: 0    3. Allergic sinusitis  - levocetirizine (XYZAL) 5 MG tablet; Take 1 tablet (5 mg total) by mouth every evening.  Dispense: 90 tablet; Refill: 3        RTC in 3-6 months based on HgbA1C     zIabela Currie MD  05/23/2022 11:00  AM        No follow-ups on file.

## 2022-05-24 LAB
ALBUMIN/CREAT UR: NORMAL UG/MG (ref 0–30)
CREAT UR-MCNC: 41 MG/DL (ref 23–375)
MICROALBUMIN UR DL<=1MG/L-MCNC: <5 UG/ML

## 2022-05-30 ENCOUNTER — PATIENT MESSAGE (OUTPATIENT)
Dept: ADMINISTRATIVE | Facility: HOSPITAL | Age: 42
End: 2022-05-30
Payer: COMMERCIAL

## 2022-07-29 DIAGNOSIS — E10.29 TYPE 1 DIABETES MELLITUS WITH MICROALBUMINURIA: ICD-10-CM

## 2022-07-29 DIAGNOSIS — R80.9 TYPE 1 DIABETES MELLITUS WITH MICROALBUMINURIA: ICD-10-CM

## 2022-07-29 RX ORDER — INSULIN GLARGINE 100 [IU]/ML
INJECTION, SOLUTION SUBCUTANEOUS
Qty: 18 EACH | Refills: 1 | Status: SHIPPED | OUTPATIENT
Start: 2022-07-29 | End: 2023-06-02

## 2022-07-29 RX ORDER — INSULIN ASPART 100 [IU]/ML
INJECTION, SOLUTION INTRAVENOUS; SUBCUTANEOUS
Qty: 14 EACH | Refills: 1 | Status: SHIPPED | OUTPATIENT
Start: 2022-07-29 | End: 2023-06-02 | Stop reason: SDUPTHER

## 2022-07-29 NOTE — TELEPHONE ENCOUNTER
No new care gaps identified.  [facility] Embedded Care Gaps. Reference number: [id number]. 7/29/2022   12:14:23 PM CDT

## 2022-07-29 NOTE — TELEPHONE ENCOUNTER
Refill Decision Note   Jay Meyer  is requesting a refill authorization.  Brief Assessment and Rationale for Refill:  Approve     Medication Therapy Plan:       Medication Reconciliation Completed: No   Comments:     No Care Gaps recommended.     Note composed:4:44 PM 07/29/2022

## 2022-10-14 ENCOUNTER — PATIENT OUTREACH (OUTPATIENT)
Dept: ADMINISTRATIVE | Facility: HOSPITAL | Age: 42
End: 2022-10-14
Payer: COMMERCIAL

## 2022-10-14 NOTE — PROGRESS NOTES
Non-compliant report chart audits. Chart review completed for HM test overdue (Mammogram, Colon Cancer Screening, Pap smear, DM labs, and/or Eye Exam)      Care Everywhere and media, updates requested and reviewed.        [facility] and [employer] reviewed.     Attempted to contact pt about scheduling dm lab and pcp appt, LMOR.

## 2022-11-22 ENCOUNTER — PATIENT MESSAGE (OUTPATIENT)
Dept: ADMINISTRATIVE | Facility: HOSPITAL | Age: 42
End: 2022-11-22
Payer: COMMERCIAL

## 2023-01-20 ENCOUNTER — PATIENT MESSAGE (OUTPATIENT)
Dept: ADMINISTRATIVE | Facility: HOSPITAL | Age: 43
End: 2023-01-20
Payer: COMMERCIAL

## 2023-02-21 ENCOUNTER — PATIENT MESSAGE (OUTPATIENT)
Dept: ADMINISTRATIVE | Facility: HOSPITAL | Age: 43
End: 2023-02-21
Payer: COMMERCIAL

## 2023-04-12 ENCOUNTER — PATIENT MESSAGE (OUTPATIENT)
Dept: ADMINISTRATIVE | Facility: HOSPITAL | Age: 43
End: 2023-04-12
Payer: COMMERCIAL

## 2023-05-23 ENCOUNTER — PATIENT MESSAGE (OUTPATIENT)
Dept: ADMINISTRATIVE | Facility: HOSPITAL | Age: 43
End: 2023-05-23
Payer: COMMERCIAL

## 2023-06-02 ENCOUNTER — OFFICE VISIT (OUTPATIENT)
Dept: ENDOCRINOLOGY | Facility: CLINIC | Age: 43
End: 2023-06-02
Payer: COMMERCIAL

## 2023-06-02 VITALS
SYSTOLIC BLOOD PRESSURE: 126 MMHG | DIASTOLIC BLOOD PRESSURE: 79 MMHG | BODY MASS INDEX: 26.35 KG/M2 | WEIGHT: 178.38 LBS | HEART RATE: 98 BPM | TEMPERATURE: 99 F

## 2023-06-02 DIAGNOSIS — E10.29 TYPE 1 DIABETES MELLITUS WITH MICROALBUMINURIA: ICD-10-CM

## 2023-06-02 DIAGNOSIS — E10.65 UNCONTROLLED TYPE 1 DIABETES MELLITUS WITH HYPERGLYCEMIA, WITH LONG-TERM CURRENT USE OF INSULIN: Primary | ICD-10-CM

## 2023-06-02 DIAGNOSIS — R80.9 TYPE 1 DIABETES MELLITUS WITH MICROALBUMINURIA: ICD-10-CM

## 2023-06-02 LAB — GLUCOSE SERPL-MCNC: 258 MG/DL (ref 70–110)

## 2023-06-02 PROCEDURE — 99204 OFFICE O/P NEW MOD 45 MIN: CPT | Mod: S$GLB,,, | Performed by: HOSPITALIST

## 2023-06-02 PROCEDURE — 82962 GLUCOSE BLOOD TEST: CPT | Mod: S$GLB,,, | Performed by: HOSPITALIST

## 2023-06-02 PROCEDURE — 99999 PR PBB SHADOW E&M-EST. PATIENT-LVL III: ICD-10-PCS | Mod: PBBFAC,,, | Performed by: HOSPITALIST

## 2023-06-02 PROCEDURE — 99204 PR OFFICE/OUTPT VISIT, NEW, LEVL IV, 45-59 MIN: ICD-10-PCS | Mod: S$GLB,,, | Performed by: HOSPITALIST

## 2023-06-02 PROCEDURE — 99999 PR PBB SHADOW E&M-EST. PATIENT-LVL III: CPT | Mod: PBBFAC,,, | Performed by: HOSPITALIST

## 2023-06-02 PROCEDURE — 82962 POCT GLUCOSE, HAND-HELD DEVICE: ICD-10-PCS | Mod: S$GLB,,, | Performed by: HOSPITALIST

## 2023-06-02 RX ORDER — INSULIN ASPART 100 [IU]/ML
INJECTION, SOLUTION INTRAVENOUS; SUBCUTANEOUS
Qty: 30 ML | Refills: 3 | Status: SHIPPED | OUTPATIENT
Start: 2023-06-02 | End: 2023-11-07 | Stop reason: SDUPTHER

## 2023-06-02 RX ORDER — INSULIN DEGLUDEC 200 U/ML
40 INJECTION, SOLUTION SUBCUTANEOUS NIGHTLY
Qty: 4 PEN | Refills: 3 | Status: SHIPPED | OUTPATIENT
Start: 2023-06-02 | End: 2023-10-15

## 2023-06-02 RX ORDER — PEN NEEDLE, DIABETIC 30 GX3/16"
NEEDLE, DISPOSABLE MISCELLANEOUS
Qty: 150 EACH | Refills: 5 | Status: SHIPPED | OUTPATIENT
Start: 2023-06-02 | End: 2023-11-07 | Stop reason: SDUPTHER

## 2023-06-02 RX ORDER — BLOOD-GLUCOSE SENSOR
EACH MISCELLANEOUS
Qty: 3 EACH | Refills: 11 | Status: SHIPPED | OUTPATIENT
Start: 2023-06-02

## 2023-06-02 NOTE — PROGRESS NOTES
"Subjective:      Patient ID: Jay Meyer is a 42 y.o. male presented to [facility] on 6/2/2023.  Chief Complaint:  Diabetes    History of Present Illness: Jay Meyer is a 42 y.o. male here for  type 1 diabetes  No other significant past medical history      Diabetes Mellitus Type 1  - Known diabetic complications: none  - Diagnosed w/ DM: in 2003 at age 22  - Diabetes Education: No  - Please seen by Ina Nunn NP in 2018 at that visit:  "He was largely unengaged and answered most questions with a short yes/no unless otherwise prompted for more detail"  - Insulin started at the time of diagnosis. He has been on a [employer]tr[employer] insulin pump and a CGM for a couple of years under Dr. Baeza's care before stopping it in 2016 because it kept getting detached.     Interval history:  Patient is here for diabetes management.  Has not seen by endocrinology for many years.  Previously seen Ina Nunn NP, also seen each [place] [facility].  Patient reports multiple family members with type 1 diabetes including brother, father.  Also grandparents with diabetes.  Patient is not on CGM, currently not on insulin pump.    On MDI insulin.  With poor compliance to mealtime insulin, skipping mealtime NovoLog at lunch on occasion  In clinic glucose check: 258, after eating breakfast  Patient also reports hypoglycemia with glucose in the 60s    Current reported meds:               Basaglar 45 units nightly   NovoLog 15-20 units before each meals 3 times a day  Misses medication doses - Yes/no  Injection Technique: Good  Rotation of injection site: Yes   Previous meds tried: none  Home glucose checks: checks 1x a day, Logs reviewed/Unavailable: oral recall:   Diet/Exercise:   - Eating 2-3 meals a day               - Snacking: peanut              - Drink: crystal light, diet, water  - Weight trend: stable  - Diabetes Related Hospitalization:  No  - Hx of pancreatitis: No, denies  - Family " history of diabetes: Yes  - Occupation: working, slot machine installing    Eye exam current (within one year): no, DR: no, unknown  Reports cuts or ulcers on feet:   Denies,   Statin: Not taking, ACE/ARB: Taking    Diabetes lab work  Lab Results   Component Value Date    HGBA1C 8.6 (H) 05/23/2022    HGBA1C 9.7 (H) 06/16/2021    HGBA1C 10.1 (H) 05/22/2020    HGBA1C 9.3 (H) 10/18/2019     Lab Results   Component Value Date    CPEPTIDE 0.13 (L) 12/05/2018    GLUTAMICACID 14.0 (H) 12/05/2018    ISLETCELLANT <1:4 12/05/2018      No results found for: FRUCTOSAMINE  Lab Results   Component Value Date    MICALBCREAT Unable to calculate 05/23/2022     No results found for: [id number]    Diabetes Management Status: Reviewed this office visit  Screening or Prevention Patient's value Goal Complete/Controlled?   Lipid profile : 05/23/2022 Annually No   Dilated retinal exam : 05/08/2023 Annually Yes   Foot exam   Most Recent Foot Exam Date: Not Found Annually No      Reviewed past surgical, medical, family, social history and updated as appropriate.  Review of Systems: see HPI above    Objective:   /79   Pulse 98   Temp 98.9 °F (37.2 °C) (Oral)   Wt 80.9 kg (178 lb 6.4 oz)   BMI 26.35 kg/m²   Body mass index is 26.35 kg/m².  Vital signs reviewed    Physical Exam  Vitals and nursing note reviewed.   Constitutional:       Appearance: Normal appearance. He is well-developed. He is not ill-appearing.   Neck:      Thyroid: No thyromegaly.   Pulmonary:      Effort: Pulmonary effort is normal. No respiratory distress.   Musculoskeletal:         General: Normal range of motion.      Cervical back: Normal range of motion.   Neurological:      General: No focal deficit present.      Mental Status: He is alert. Mental status is at baseline.   Psychiatric:         Mood and Affect: Mood normal.         Behavior: Behavior normal.       Lab Reviewed:  See results in subjective  Lab Results   Component Value Date    HGBA1C 8.6 (H)  "05/23/2022     Lab Results   Component Value Date    CHOL 197 05/23/2022    HDL 43 05/23/2022    LDLCALC 131.4 05/23/2022    TRIG 113 05/23/2022    CHOLHDL 21.8 05/23/2022     Lab Results   Component Value Date     05/23/2022    K 4.5 05/23/2022     05/23/2022    CO2 27 05/23/2022     (H) 05/23/2022    BUN 14 05/23/2022    CREATININE 0.7 05/23/2022    CALCIUM 9.9 05/23/2022    PROT 7.3 05/23/2022    ALBUMIN 4.1 05/23/2022    BILITOT 0.9 05/23/2022    ALKPHOS 109 05/23/2022    AST 17 05/23/2022    ALT 31 05/23/2022    ANIONGAP 8 05/23/2022    ESTGFRAFRICA >60 05/23/2022    EGFRNONAA >60 05/23/2022    TSH 0.900 05/23/2022     Assessment     1. Uncontrolled type 1 diabetes mellitus with hyperglycemia, with long-term current use of insulin  DEXCOM G7 SENSOR Mary Beth    TRESIBA FLEXTOUCH U-200 200 unit/mL (3 mL) insulin pen    insulin aspart U-100 (NOVOLOG FLEXPEN U-100 INSULIN) 100 unit/mL (3 mL) InPn pen    Basic Metabolic Panel    Hemoglobin A1C    pen needle, diabetic (BD ULTRA-FINE MATA PEN NEEDLE) 32 gauge x 5/32" Ndle    Microalbumin/Creatinine Ratio, Urine    Glutamic Acid Decarboxylase    C-Peptide    Anti-islet cell antibody      2. Type 1 diabetes mellitus with microalbuminuria             Plan     Uncontrolled type 1 diabetes mellitus with hyperglycemia, with long-term current use of insulin  - Diabetes is not at goal, given hyperglycemia in clinic today, no recent A1C for comparison, Goal A1C for patient is 7%  - Complicated by type 1 diabetes with frequent hyperglycemia and hypoglycemia  - skipping mealtime insulin leading to hyperglycemia  - Diabetes goal including glucose glucose and A1C goals discussed  - Advised patient to get routine feet care, routine eye exam, plus routine maintenance screening, reviewed   - Discussed proper insulin injection technique: Including rotation of injection sites, using new insulin needles  - Diabetic supplies/medications were reviewed this visit to ensure " continue steady supplies    Plan  - Given patient's type 1 diabetes status, would benefit from Dexcom G7, order sent to pharmacy  - Adjustment made:  Advised better compliant with MDI insulin              Switch to Tresiba 40 units nightly   Continue NovoLog 15-20 units before each meals 3 times a day  - Encouragement of dietary modification, portion size control, decreasing carbohydrates intake  - Advised pt to check glucoses regularly, asked to filled glucose log and bring back for review at next office visit  - Patient candidate for CGM: Dexcom G7  - Symptoms of hypoglycemia discussed, advised in the treatment of hypoglycemia  - Follow up as scheduled    Advised patient to follow up with PCP for routine health maintenance care.   RTC in 3 months    West Casas M.D.  Endocrinology  [facility]  6/2/2023      Disclaimer: This note has been generated in part with the use of voice-recognition software. There may be typographical errors that have been missed during proof-reading.

## 2023-06-02 NOTE — ASSESSMENT & PLAN NOTE
- Diabetes is not at goal, given hyperglycemia in clinic today, no recent A1C for comparison, Goal A1C for patient is 7%  - Complicated by type 1 diabetes with frequent hyperglycemia and hypoglycemia  - skipping mealtime insulin leading to hyperglycemia  - Diabetes goal including glucose glucose and A1C goals discussed  - Advised patient to get routine feet care, routine eye exam, plus routine maintenance screening, reviewed   - Discussed proper insulin injection technique: Including rotation of injection sites, using new insulin needles  - Diabetic supplies/medications were reviewed this visit to ensure continue steady supplies    Plan  - Given patient's type 1 diabetes status, would benefit from Dexcom G7, order sent to pharmacy  - Adjustment made:  Advised better compliant with MDI insulin              Switch to Tresiba 40 units nightly   Continue NovoLog 15-20 units before each meals 3 times a day  - Encouragement of dietary modification, portion size control, decreasing carbohydrates intake  - Advised pt to check glucoses regularly, asked to filled glucose log and bring back for review at next office visit  - Patient candidate for CGM: Dexcom G7  - Symptoms of hypoglycemia discussed, advised in the treatment of hypoglycemia  - Follow up as scheduled

## 2023-08-25 ENCOUNTER — LAB VISIT (OUTPATIENT)
Dept: LAB | Facility: HOSPITAL | Age: 43
End: 2023-08-25
Attending: HOSPITALIST
Payer: COMMERCIAL

## 2023-08-25 DIAGNOSIS — E10.65 UNCONTROLLED TYPE 1 DIABETES MELLITUS WITH HYPERGLYCEMIA, WITH LONG-TERM CURRENT USE OF INSULIN: ICD-10-CM

## 2023-08-25 LAB
ANION GAP SERPL CALC-SCNC: 7 MMOL/L (ref 8–16)
BUN SERPL-MCNC: 9 MG/DL (ref 6–20)
C PEPTIDE SERPL-MCNC: 0.13 NG/ML (ref 0.78–5.19)
CALCIUM SERPL-MCNC: 8.6 MG/DL (ref 8.7–10.5)
CHLORIDE SERPL-SCNC: 104 MMOL/L (ref 95–110)
CO2 SERPL-SCNC: 25 MMOL/L (ref 23–29)
CREAT SERPL-MCNC: 0.9 MG/DL (ref 0.5–1.4)
EST. GFR  (NO RACE VARIABLE): >60 ML/MIN/1.73 M^2
ESTIMATED AVG GLUCOSE: 151 MG/DL (ref 68–131)
GLUCOSE SERPL-MCNC: 252 MG/DL (ref 70–110)
HBA1C MFR BLD: 6.9 % (ref 4–5.6)
POTASSIUM SERPL-SCNC: 4.6 MMOL/L (ref 3.5–5.1)
SODIUM SERPL-SCNC: 136 MMOL/L (ref 136–145)

## 2023-08-25 PROCEDURE — 36415 COLL VENOUS BLD VENIPUNCTURE: CPT | Performed by: HOSPITALIST

## 2023-08-25 PROCEDURE — 86341 ISLET CELL ANTIBODY: CPT | Mod: 91 | Performed by: HOSPITALIST

## 2023-08-25 PROCEDURE — 84681 ASSAY OF C-PEPTIDE: CPT | Performed by: HOSPITALIST

## 2023-08-25 PROCEDURE — 86341 ISLET CELL ANTIBODY: CPT | Performed by: HOSPITALIST

## 2023-08-25 PROCEDURE — 83036 HEMOGLOBIN GLYCOSYLATED A1C: CPT | Performed by: HOSPITALIST

## 2023-08-25 PROCEDURE — 80048 BASIC METABOLIC PNL TOTAL CA: CPT | Performed by: HOSPITALIST

## 2023-08-29 LAB
GAD65 AB SER-SCNC: 18.5 NMOL/L
PANC ISLET CELL IGG SER-ACNC: NORMAL

## 2023-10-15 DIAGNOSIS — E10.65 UNCONTROLLED TYPE 1 DIABETES MELLITUS WITH HYPERGLYCEMIA, WITH LONG-TERM CURRENT USE OF INSULIN: ICD-10-CM

## 2023-10-15 RX ORDER — INSULIN DEGLUDEC 200 U/ML
40 INJECTION, SOLUTION SUBCUTANEOUS NIGHTLY
Qty: 5 PEN | Refills: 3 | Status: SHIPPED | OUTPATIENT
Start: 2023-10-15 | End: 2024-03-19

## 2023-11-07 ENCOUNTER — OFFICE VISIT (OUTPATIENT)
Dept: ENDOCRINOLOGY | Facility: CLINIC | Age: 43
End: 2023-11-07
Payer: COMMERCIAL

## 2023-11-07 VITALS
WEIGHT: 192.81 LBS | SYSTOLIC BLOOD PRESSURE: 164 MMHG | BODY MASS INDEX: 28.47 KG/M2 | HEART RATE: 103 BPM | DIASTOLIC BLOOD PRESSURE: 94 MMHG | TEMPERATURE: 99 F

## 2023-11-07 DIAGNOSIS — E11.65 TYPE 2 DIABETES MELLITUS WITH HYPERGLYCEMIA, WITH LONG-TERM CURRENT USE OF INSULIN: ICD-10-CM

## 2023-11-07 DIAGNOSIS — Z79.4 TYPE 2 DIABETES MELLITUS WITH HYPERGLYCEMIA, WITH LONG-TERM CURRENT USE OF INSULIN: ICD-10-CM

## 2023-11-07 DIAGNOSIS — E10.65 UNCONTROLLED TYPE 1 DIABETES MELLITUS WITH HYPERGLYCEMIA, WITH LONG-TERM CURRENT USE OF INSULIN: ICD-10-CM

## 2023-11-07 DIAGNOSIS — E10.65 TYPE 1 DIABETES MELLITUS WITH HYPERGLYCEMIA, WITH LONG-TERM CURRENT USE OF INSULIN: Primary | ICD-10-CM

## 2023-11-07 PROCEDURE — 99214 OFFICE O/P EST MOD 30 MIN: CPT | Mod: S$GLB,,, | Performed by: HOSPITALIST

## 2023-11-07 PROCEDURE — 99999 PR PBB SHADOW E&M-EST. PATIENT-LVL IV: ICD-10-PCS | Mod: PBBFAC,,, | Performed by: HOSPITALIST

## 2023-11-07 PROCEDURE — 99214 PR OFFICE/OUTPT VISIT, EST, LEVL IV, 30-39 MIN: ICD-10-PCS | Mod: S$GLB,,, | Performed by: HOSPITALIST

## 2023-11-07 PROCEDURE — 99999 PR PBB SHADOW E&M-EST. PATIENT-LVL IV: CPT | Mod: PBBFAC,,, | Performed by: HOSPITALIST

## 2023-11-07 PROCEDURE — 95251 PR GLUCOSE MONITOR, 72 HOUR, PHYS INTERP: ICD-10-PCS | Mod: S$GLB,,, | Performed by: HOSPITALIST

## 2023-11-07 PROCEDURE — 95251 CONT GLUC MNTR ANALYSIS I&R: CPT | Mod: S$GLB,,, | Performed by: HOSPITALIST

## 2023-11-07 RX ORDER — INSULIN ASPART 100 [IU]/ML
INJECTION, SOLUTION INTRAVENOUS; SUBCUTANEOUS
Qty: 30 ML | Refills: 8 | Status: SHIPPED | OUTPATIENT
Start: 2023-11-07

## 2023-11-07 RX ORDER — PEN NEEDLE, DIABETIC 30 GX3/16"
NEEDLE, DISPOSABLE MISCELLANEOUS
Qty: 150 EACH | Refills: 5 | Status: SHIPPED | OUTPATIENT
Start: 2023-11-07

## 2023-11-07 NOTE — PATIENT INSTRUCTIONS
Tresiba 35 units nightly  NovoLog 15-20 units before each meals 3 times a day    Jardiance 10mg daily >trial

## 2023-11-07 NOTE — PROGRESS NOTES
"Subjective:      Patient ID: Jay Meyer is a 43 y.o. male presented to [facility] on 11/7/2023.  Chief Complaint:  Diabetes    History of Present Illness: Jay Meyer is a 43 y.o. male here for  type 1 diabetes  No other significant past medical history    Diabetes Mellitus Type 1  - Known diabetic complications: none  - Diagnosed w/ DM: in 2003 at age 22  - Diabetes Education: No  - Please seen by Ina Nunn NP in 2018 at that visit:  "He was largely unengaged and answered most questions with a short yes/no unless otherwise prompted for more detail"  - Insulin started at the time of diagnosis. He has been on a Medtr[employer] insulin pump and a CGM for a couple of years under Dr. Baeza's care before stopping it in 2016 because it kept getting detached.   - initially saw 1st time seen by 6/2023: Has not seen by endocrinology for many years. Patient reports multiple family members with type 1 diabetes including brother, father.  Also grandparents with diabetes. On MDI insulin.  With poor compliance to mealtime insulin, skipping mealtime NovoLog at lunch on occasion. In clinic glucose check: 258, after eating breakfast     Interval history:  Interval history: Patient is here for follow-up type 1 diabetes, A1c 6.9 % done on 8/25/2023   Currently on Dexcom G7, enjoying the device  Patient is requesting Jardiance as his father and multiple other family member being on this medication with help with glucose control.    Patient reports busy work schedule, leading to difficult to manage diet, leading to postprandial hyperglycemia noted on Dexcom  Does have overnight hypoglycemia, has been decreasing long-acting insulin      Current reported meds:               Tresiba 40 units nightly   NovoLog 15-20 units before each meals 3 times a day  Misses medication doses - Yes/no  Injection Technique: Good  Rotation of injection site: Yes   Previous meds tried: none  Home glucose checks: checks " "DEXCOM G7    CGM download and interpretation: Data was downloaded and personally reviewed by myself  CGM type:  Dexcom G7  Number of Day CGM worn:  14/14d, percentage of time CGM is active: 100%  Average blood glucose:  194, Glucose variability: 87, Glucose management indicator (GMI):  8.0%  Time in Range:  27% very high, 21% High, 49% Target Range, 2% low, <1% very low>> reviewed and discussed, goal TIR discussed with patient    Patient with nocturnal hypoglycemia occasional at 2:23 a.m..  Waking him up.  Some mild symptomatic.    Does have postprandial hyperglycemia noted with breakfast and lunch.  With glucose increasing upward 350.  Patient endorse missing insulin injection on occasion or late injection  Data and Recommendation discussed with patient in clinic today  >>See CGM data scan into our system, media tab<<       Diet/Exercise:   - Eating 2-3 meals a day               - Snacking: peanut              - Drink: crystal light, diet, water  - Weight trend: stable  - Diabetes Related Hospitalization:  No  - Hx of pancreatitis: No, denies  - Family history of diabetes: Yes  - Occupation: working, slot machine installing    Eye exam current (within one year): no, DR: no, unknown  Reports cuts or ulcers on feet:   Denies,   Statin: Not taking, ACE/ARB: Taking    Diabetes lab work  Lab Results   Component Value Date    HGBA1C 6.9 (H) 08/25/2023    HGBA1C 8.6 (H) 05/23/2022    HGBA1C 9.7 (H) 06/16/2021    HGBA1C 10.1 (H) 05/22/2020     Lab Results   Component Value Date    CPEPTIDE 0.13 (L) 08/25/2023    CPEPTIDE 0.13 (L) 12/05/2018    GLUTAMICACID 18.5 (H) 08/25/2023    ISLETCELLANT <1:4 08/25/2023      No results found for: "FRUCTOSAMINE"  Lab Results   Component Value Date    MICALBCREAT 6.0 08/25/2023     No results found for: "MKIVIIEU27"    Diabetes Management Status: Reviewed this office visit  Screening or Prevention Patient's value Goal Complete/Controlled?   Lipid profile : 05/23/2022 Annually No "   Dilated retinal exam : 05/08/2023 Annually Yes   Foot exam   Most Recent Foot Exam Date: Not Found Annually No      Reviewed past surgical, medical, family, social history and updated as appropriate.  Review of Systems: see HPI above    Objective:   BP (!) 164/94   Pulse 103   Temp 99.3 °F (37.4 °C) (Oral)   Wt 87.5 kg (192 lb 12.8 oz)   BMI 28.47 kg/m²   Body mass index is 28.47 kg/m².  Vital signs reviewed    Physical Exam  Vitals and nursing note reviewed.   Constitutional:       Appearance: Normal appearance. He is well-developed. He is not ill-appearing.   Neck:      Thyroid: No thyromegaly.   Pulmonary:      Effort: Pulmonary effort is normal. No respiratory distress.   Musculoskeletal:         General: Normal range of motion.      Cervical back: Normal range of motion.   Neurological:      General: No focal deficit present.      Mental Status: He is alert. Mental status is at baseline.   Psychiatric:         Mood and Affect: Mood normal.         Behavior: Behavior normal.       Lab Reviewed:  See results in subjective  Lab Results   Component Value Date    HGBA1C 6.9 (H) 08/25/2023     Lab Results   Component Value Date    CHOL 197 05/23/2022    HDL 43 05/23/2022    LDLCALC 131.4 05/23/2022    TRIG 113 05/23/2022    CHOLHDL 21.8 05/23/2022     Lab Results   Component Value Date     08/25/2023    K 4.6 08/25/2023     08/25/2023    CO2 25 08/25/2023     (H) 08/25/2023    BUN 9 08/25/2023    CREATININE 0.9 08/25/2023    CALCIUM 8.6 (L) 08/25/2023    PROT 7.3 05/23/2022    ALBUMIN 4.1 05/23/2022    BILITOT 0.9 05/23/2022    ALKPHOS 109 05/23/2022    AST 17 05/23/2022    ALT 31 05/23/2022    ANIONGAP 7 (L) 08/25/2023    ESTGFRAFRICA >60 05/23/2022    EGFRNONAA >60 05/23/2022    TSH 0.900 05/23/2022     Assessment     1. Type 1 diabetes mellitus with hyperglycemia, with long-term current use of insulin        2. Type 2 diabetes mellitus with hyperglycemia, with long-term current use of  "insulin  empagliflozin (JARDIANCE) 10 mg tablet      3. Uncontrolled type 1 diabetes mellitus with hyperglycemia, with long-term current use of insulin  pen needle, diabetic (BD ULTRA-FINE MATA PEN NEEDLE) 32 gauge x 5/32" Ndle    insulin aspart U-100 (NOVOLOG FLEXPEN U-100 INSULIN) 100 unit/mL (3 mL) InPn pen    HEMOGLOBIN A1C    RENAL FUNCTION PANEL          Plan     Type 1 diabetes mellitus with hyperglycemia, with long-term current use of insulin  - Diabetes is not at goal, given hyperglycemia noted on Dexcom Time-in-range and GM  - Goal A1C for patient is 7%  - Complicated by type 1 diabetes with frequent hyperglycemia and hypoglycemia  - skipping mealtime insulin leading to hyperglycemia  - Diabetes goal including glucose glucose and A1C goals discussed  - Advised patient to get routine feet care, routine eye exam, plus routine maintenance screening, reviewed   - Discussed proper insulin injection technique: Including rotation of injection sites, using new insulin needles  - Diabetic supplies/medications were reviewed this visit to ensure continue steady supplies    Plan  - Given patient's type 1 diabetes status, would benefit from continue use of Dexcom G7 CGM  - Given hypoglycemia overnight, decrease Tresiba to 35 units q.h.s.  - Advised better compliance with mealtime insulin NovoLog 15-20 units before each meals 3 times a day  - patient requesting a trial of SGLT-2 Inh, given insulin resistant status, will send low-dose Jardiance 10 mg daily as trial, side effect profile discussed.  Advised patient to stop immediately if having any sign of ketoacidosis.  Including nausea/vomiting.  Patient is aware that currently this medication is not approved in the [place] for type 1 diabetes management.  It is approved for type 1 diabetes management outside of the [place]  - Encouragement of dietary modification, portion size control, decreasing carbohydrates intake  - Symptoms of hypoglycemia discussed, advised in the " treatment of hypoglycemia  - Follow up as scheduled    Elevated blood pressure  - continue monitor blood pressure, may need blood pressure medication per PCP    Advised patient to follow up with PCP for routine health maintenance care.   RTC in 3-4 months    West Casas M.D.  Endocrinology  [facility]  11/7/2023      Disclaimer: This note has been generated in part with the use of voice-recognition software. There may be typographical errors that have been missed during proof-reading.

## 2023-11-08 NOTE — ASSESSMENT & PLAN NOTE
- Diabetes is not at goal, given hyperglycemia noted on Dexcom Time-in-range and GM  - Goal A1C for patient is 7%  - Complicated by type 1 diabetes with frequent hyperglycemia and hypoglycemia  - skipping mealtime insulin leading to hyperglycemia  - Diabetes goal including glucose glucose and A1C goals discussed  - Advised patient to get routine feet care, routine eye exam, plus routine maintenance screening, reviewed   - Discussed proper insulin injection technique: Including rotation of injection sites, using new insulin needles  - Diabetic supplies/medications were reviewed this visit to ensure continue steady supplies    Plan  - Given patient's type 1 diabetes status, would benefit from continue use of Dexcom G7 CGM  - Given hypoglycemia overnight, decrease Tresiba to 35 units q.h.s.  - Advised better compliance with mealtime insulin NovoLog 15-20 units before each meals 3 times a day  - patient requesting a trial of SGLT-2 Inh, given insulin resistant status, will send low-dose Jardiance 10 mg daily as trial, side effect profile discussed.  Advised patient to stop immediately if having any sign of ketoacidosis.  Including nausea/vomiting.  Patient is aware that currently this medication is not approved in the [place] for type 1 diabetes management.  It is approved for type 1 diabetes management outside of the [place]  - Encouragement of dietary modification, portion size control, decreasing carbohydrates intake  - Symptoms of hypoglycemia discussed, advised in the treatment of hypoglycemia  - Follow up as scheduled

## 2023-12-16 ENCOUNTER — OFFICE VISIT (OUTPATIENT)
Dept: URGENT CARE | Facility: CLINIC | Age: 43
End: 2023-12-16
Payer: COMMERCIAL

## 2023-12-16 VITALS
RESPIRATION RATE: 18 BRPM | DIASTOLIC BLOOD PRESSURE: 78 MMHG | HEART RATE: 101 BPM | OXYGEN SATURATION: 95 % | SYSTOLIC BLOOD PRESSURE: 136 MMHG | HEIGHT: 69 IN | BODY MASS INDEX: 28.44 KG/M2 | TEMPERATURE: 100 F | WEIGHT: 192 LBS

## 2023-12-16 DIAGNOSIS — R68.89 FLU-LIKE SYMPTOMS: ICD-10-CM

## 2023-12-16 DIAGNOSIS — J11.1 FLU: Primary | ICD-10-CM

## 2023-12-16 DIAGNOSIS — R05.1 ACUTE COUGH: ICD-10-CM

## 2023-12-16 LAB
CTP QC/QA: YES
POC MOLECULAR INFLUENZA A AGN: POSITIVE
POC MOLECULAR INFLUENZA B AGN: NEGATIVE

## 2023-12-16 PROCEDURE — 87502 INFLUENZA DNA AMP PROBE: CPT | Mod: QW,S$GLB,, | Performed by: FAMILY MEDICINE

## 2023-12-16 PROCEDURE — 87502 POCT INFLUENZA A/B MOLECULAR: ICD-10-PCS | Mod: QW,S$GLB,, | Performed by: FAMILY MEDICINE

## 2023-12-16 PROCEDURE — 99213 PR OFFICE/OUTPT VISIT, EST, LEVL III, 20-29 MIN: ICD-10-PCS | Mod: S$GLB,,, | Performed by: FAMILY MEDICINE

## 2023-12-16 PROCEDURE — 99213 OFFICE O/P EST LOW 20 MIN: CPT | Mod: S$GLB,,, | Performed by: FAMILY MEDICINE

## 2023-12-16 RX ORDER — OSELTAMIVIR PHOSPHATE 75 MG/1
75 CAPSULE ORAL 2 TIMES DAILY
Qty: 10 CAPSULE | Refills: 0 | Status: SHIPPED | OUTPATIENT
Start: 2023-12-16 | End: 2023-12-21

## 2023-12-16 RX ORDER — PROMETHAZINE HYDROCHLORIDE AND DEXTROMETHORPHAN HYDROBROMIDE 6.25; 15 MG/5ML; MG/5ML
5 SYRUP ORAL EVERY 6 HOURS PRN
Qty: 180 ML | Refills: 0 | Status: SHIPPED | OUTPATIENT
Start: 2023-12-16

## 2023-12-16 RX ORDER — GUAIFENESIN 600 MG/1
1200 TABLET, EXTENDED RELEASE ORAL 2 TIMES DAILY
Qty: 40 TABLET | Refills: 0 | Status: SHIPPED | OUTPATIENT
Start: 2023-12-16 | End: 2023-12-26

## 2023-12-16 NOTE — LETTER
December 16, 2023      [facility]  [address] [facility], [address]  [place] [address]  Phone: [phone]  Fax: [phone]       Patient: Jay Meyer   YOB: 1980  Date of Visit: 12/16/2023    To Whom It May Concern:    Neptali Meyer  was at [facility] on 12/16/2023. The patient may return to work/school on 12/20/2023 with no restrictions. If you have any questions or concerns, or if I can be of further assistance, please do not hesitate to contact me.    Sincerely,    Megan Gaines NP

## 2023-12-16 NOTE — PATIENT INSTRUCTIONS
General Discharge Instructions   PLEASE READ YOUR DISCHARGE INSTRUCTIONS ENTIRELY AS IT CONTAINS IMPORTANT INFORMATION.  If you were prescribed a narcotic or controlled medication, do not drive or operate heavy equipment or machinery while taking these medications.  If you were prescribed antibiotics, please take them to completion.  You must understand that you've received an Urgent Care treatment only and that you may be released before all your medical problems are known or treated. You, the patient, will arrange for follow up care as instructed.    OVER THE COUNTER RECOMMENDATIONS/SUGGESTIONS.    Make sure to stay well hydrated.    Use Nasal Saline to mechanically move any post nasal drip from your eustachian tube or from the back of your throat.    Use warm salt water gargles to ease your throat pain. Warm salt water gargles as needed for sore throat- 1/2 tsp salt to 1 cup warm water, gargle as desired.    Use an antihistamine such as Claritin, Zyrtec or Allegra to dry you out.    Use pseudoephedrine (behind the counter) to decongest. Pseudoephedrine 30 mg up to 240 mg /day. It can raise your blood pressure and give you palpitations.    Use mucinex (guaifenesin) to break up mucous up to 2400mg/day to loosen any mucous.    The mucinex DM pill has a cough suppressant that can be sedating. It can be used at night to stop the tickle at the back of your throat.    You can use Mucinex D (it has guaifenesin and a high dose of pseudoephedrine) in the mornings to help decongest.    Use Afrin in each nare for no longer than 3 days, as it is addictive. It can also dry out your mucous membranes and cause elevated blood pressure. This is especially useful if you are flying.    Use Flonase 1-2 sprays/nostril per day. It is a local acting steroid nasal spray, if you develop a bloody nose, stop using the medication immediately.    Sometimes Nyquil at night is beneficial to help you get some rest, however it is sedating and it  does have an antihistamine, and tylenol.    Honey is a natural cough suppressant that can be used.    Tylenol up to 4,000 mg a day is safe for short periods and can be used for body aches, pain, and fever. However in high doses and prolonged use it can cause liver irritation.    Ibuprofen is a non-steroidal anti-inflammatory that can be used for body aches, pain, and fever.However it can also cause stomach irritation if over used.     Follow up with your PCP or specialty clinic as instructed in the next 2-3 days if not improved or as needed. You can call [phone] to schedule an appointment with appropriate provider.      If you condition worsens, we recommend that you receive another evaluation at the emergency room immediately or contact your primary medical clinic's after hours call service to discuss your concerns.      Please return here or go to the Emergency Department for any concerns or worsening condition.   You can also call [phone] to schedule an appointment with the appropriate provider.    Please return here or go to the Emergency Department for any concerns or worsening of condition.    Thank you for choosing [facility]!    Our goal in the Urgent Care is to always provide outstanding medical care. You may receive a survey by mail or e-mail in the next week regarding your experience today. We would greatly appreciate you completing and returning the survey. Your feedback provides us with a way to recognize our staff who provide very good care, and it helps us learn how to improve when your experience was below our aspiration of excellence.      We appreciate you trusting us with your medical care. We hope you feel better soon. We will be happy to take care of you for all of your future medical needs.    Sincerely,    MARK Mota  You have been diagnosed with Influenza.   You are contagious for 24 hours after your last fever.  Please drink plenty of fluids.  Please get  plenty of rest.  Please return here or go to the Emergency Department for any concerns or worsening of condition.  Tamiflu prescription has been discussed and if prescribed, please take to completion unless you cannot tolerate the side effects.   If you were prescribed a narcotic medication, do not drive or operate heavy equipment or machinery while taking these medications.  If you were given a steroid shot in the clinic and have also been given a prescription for a steroid such as Prednisone or a Medrol Dose Pack, please begin taking them tomorrow.  Take tylenol (acetominophen) for fever, chills or body aches every 4 hours. do not exceed 4000 mg/ day.  Take Motrin (Ibuprofen) every 4 hours for fever, chills, pain or inflammation.  Use an antihistmine such as claritin or zyrtec to dry you out. Use pseudoephedrine (behind the counter) to decongest (beware this can raise your blood pressure). Use mucinex (guaifenisin) to break up mucous

## 2024-02-22 ENCOUNTER — TELEPHONE (OUTPATIENT)
Dept: ENDOCRINOLOGY | Facility: CLINIC | Age: 44
End: 2024-02-22
Payer: COMMERCIAL

## 2024-02-22 NOTE — TELEPHONE ENCOUNTER
Informed pt that he'll have to upload the carie dexcom and clarity carie again. Since he doesn't know his sign on he would have to reset his password. Understanding verbalized.

## 2024-02-22 NOTE — TELEPHONE ENCOUNTER
----- Message from Franci Gant sent at 2/22/2024  8:31 AM CST -----  Regarding: self [phone]  Type: Patient Call Back    Who called: self     What is the request in detail: Pt stated his Glipho7 carie deleted off his phone and needs help resetting it.     Can the clinic reply by [facility]? no    Would the patient rather a call back or a response via [facility]? Call back     Best call back number: [phone]

## 2024-03-19 DIAGNOSIS — E10.65 UNCONTROLLED TYPE 1 DIABETES MELLITUS WITH HYPERGLYCEMIA, WITH LONG-TERM CURRENT USE OF INSULIN: ICD-10-CM

## 2024-03-19 RX ORDER — INSULIN DEGLUDEC 200 U/ML
40 INJECTION, SOLUTION SUBCUTANEOUS NIGHTLY
Qty: 6 PEN | Refills: 1 | Status: SHIPPED | OUTPATIENT
Start: 2024-03-19 | End: 2024-04-08 | Stop reason: SDUPTHER

## 2024-04-01 ENCOUNTER — LAB VISIT (OUTPATIENT)
Dept: LAB | Facility: HOSPITAL | Age: 44
End: 2024-04-01
Attending: HOSPITALIST
Payer: COMMERCIAL

## 2024-04-01 DIAGNOSIS — E10.65 UNCONTROLLED TYPE 1 DIABETES MELLITUS WITH HYPERGLYCEMIA, WITH LONG-TERM CURRENT USE OF INSULIN: ICD-10-CM

## 2024-04-01 LAB
ALBUMIN SERPL BCP-MCNC: 4 G/DL (ref 3.5–5.2)
ANION GAP SERPL CALC-SCNC: 10 MMOL/L (ref 8–16)
BUN SERPL-MCNC: 14 MG/DL (ref 6–20)
CALCIUM SERPL-MCNC: 9.4 MG/DL (ref 8.7–10.5)
CHLORIDE SERPL-SCNC: 111 MMOL/L (ref 95–110)
CO2 SERPL-SCNC: 24 MMOL/L (ref 23–29)
CREAT SERPL-MCNC: 0.9 MG/DL (ref 0.5–1.4)
EST. GFR  (NO RACE VARIABLE): >60 ML/MIN/1.73 M^2
ESTIMATED AVG GLUCOSE: 148 MG/DL (ref 68–131)
GLUCOSE SERPL-MCNC: 126 MG/DL (ref 70–110)
HBA1C MFR BLD: 6.8 % (ref 4–5.6)
PHOSPHATE SERPL-MCNC: 3 MG/DL (ref 2.7–4.5)
POTASSIUM SERPL-SCNC: 4.8 MMOL/L (ref 3.5–5.1)
SODIUM SERPL-SCNC: 145 MMOL/L (ref 136–145)

## 2024-04-01 PROCEDURE — 80069 RENAL FUNCTION PANEL: CPT | Performed by: HOSPITALIST

## 2024-04-01 PROCEDURE — 83036 HEMOGLOBIN GLYCOSYLATED A1C: CPT | Performed by: HOSPITALIST

## 2024-04-01 PROCEDURE — 36415 COLL VENOUS BLD VENIPUNCTURE: CPT | Performed by: HOSPITALIST

## 2024-04-08 ENCOUNTER — OFFICE VISIT (OUTPATIENT)
Dept: ENDOCRINOLOGY | Facility: CLINIC | Age: 44
End: 2024-04-08
Payer: COMMERCIAL

## 2024-04-08 VITALS
WEIGHT: 186 LBS | HEART RATE: 80 BPM | BODY MASS INDEX: 27.47 KG/M2 | DIASTOLIC BLOOD PRESSURE: 84 MMHG | SYSTOLIC BLOOD PRESSURE: 130 MMHG

## 2024-04-08 DIAGNOSIS — R80.9 TYPE 1 DIABETES MELLITUS WITH MICROALBUMINURIA: ICD-10-CM

## 2024-04-08 DIAGNOSIS — E11.65 TYPE 2 DIABETES MELLITUS WITH HYPERGLYCEMIA, WITH LONG-TERM CURRENT USE OF INSULIN: ICD-10-CM

## 2024-04-08 DIAGNOSIS — E10.65 UNCONTROLLED TYPE 1 DIABETES MELLITUS WITH HYPERGLYCEMIA, WITH LONG-TERM CURRENT USE OF INSULIN: ICD-10-CM

## 2024-04-08 DIAGNOSIS — Z79.4 TYPE 2 DIABETES MELLITUS WITH HYPERGLYCEMIA, WITH LONG-TERM CURRENT USE OF INSULIN: ICD-10-CM

## 2024-04-08 DIAGNOSIS — E10.65 TYPE 1 DIABETES MELLITUS WITH HYPERGLYCEMIA, WITH LONG-TERM CURRENT USE OF INSULIN: Primary | ICD-10-CM

## 2024-04-08 DIAGNOSIS — E10.29 TYPE 1 DIABETES MELLITUS WITH MICROALBUMINURIA: ICD-10-CM

## 2024-04-08 PROBLEM — R74.8 ELEVATED ALKALINE PHOSPHATASE LEVEL: Status: RESOLVED | Noted: 2019-10-18 | Resolved: 2024-04-08

## 2024-04-08 PROCEDURE — 99214 OFFICE O/P EST MOD 30 MIN: CPT | Mod: 25,S$GLB,, | Performed by: HOSPITALIST

## 2024-04-08 PROCEDURE — 99999 PR PBB SHADOW E&M-EST. PATIENT-LVL IV: CPT | Mod: PBBFAC,,, | Performed by: HOSPITALIST

## 2024-04-08 PROCEDURE — 95251 CONT GLUC MNTR ANALYSIS I&R: CPT | Mod: S$GLB,,, | Performed by: HOSPITALIST

## 2024-04-08 RX ORDER — BLOOD-GLUCOSE SENSOR
EACH MISCELLANEOUS
Qty: 3 EACH | Refills: 11 | Status: SHIPPED | OUTPATIENT
Start: 2024-04-08

## 2024-04-08 RX ORDER — INSULIN DEGLUDEC 200 U/ML
32 INJECTION, SOLUTION SUBCUTANEOUS NIGHTLY
Qty: 6 PEN | Refills: 1 | Status: SHIPPED | OUTPATIENT
Start: 2024-04-08 | End: 2025-04-08

## 2024-04-08 RX ORDER — INSULIN ASPART 100 [IU]/ML
INJECTION, SOLUTION INTRAVENOUS; SUBCUTANEOUS
Qty: 30 ML | Refills: 8 | Status: CANCELLED | OUTPATIENT
Start: 2024-04-08

## 2024-04-08 RX ORDER — PEN NEEDLE, DIABETIC 30 GX3/16"
NEEDLE, DISPOSABLE MISCELLANEOUS
Qty: 150 EACH | Refills: 5 | Status: SHIPPED | OUTPATIENT
Start: 2024-04-08

## 2024-04-08 NOTE — PATIENT INSTRUCTIONS
Tresiba 32 units nightly  Novolog/Insulin Aspart: 15-25 units before each meals 3 times a day  Jardiance 25mg daily      DEXCOM G7

## 2024-04-08 NOTE — PROGRESS NOTES
"Subjective:      Patient ID: Jay Meyer is a 43 y.o. male presented to [facility] on 4/8/2024.  Chief Complaint:  Diabetes    History of Present Illness: Jay Meyer is a 43 y.o. male here for  type 1 diabetes  No other significant past medical history    Diabetes Mellitus Type 1  - Known diabetic complications: none  - Diagnosed w/ DM: in 2003 at age 22  - Diabetes Education: No  - Please seen by Ina Nunn NP in 2018 at that visit:  "He was largely unengaged and answered most questions with a short yes/no unless otherwise prompted for more detail"  - Insulin started at the time of diagnosis. He has been on a Medtronic insulin pump and a CGM for a couple of years under Dr. Baeza's care before stopping it in 2016 because it kept getting detached.   - initially saw 1st time seen by 6/2023: Has not seen by endocrinology for many years. Patient reports multiple family members with type 1 diabetes including brother, father.  Also grandparents with diabetes. On MDI insulin.  With poor compliance to mealtime insulin, skipping mealtime NovoLog at lunch on occasion. In clinic glucose check: 258, after eating breakfast  - On 11/7/2023: patient request SGLT-2 Inh, given insulin resistant status, started Jardiance 10 mg daily as trial, side effect profile discussed. Advised patient to stop immediately if having any sign of ketoacidosis. Including nausea/vomiting. Hx of diabetes nephropathy noted in 2018       Interval history:  Interval history: Patient is here for follow-up type 1 diabetes, A1c stable at 6.8%  Currently on Dexcom G7, enjoying the device  On Jardiance without any issues.   Requesting dose increase.  Patient reports insulin compliance with Tresiba and NovoLog.  No issues.  Does have overnight hypoglycemia, has been decreasing long-acting insulin      Current reported meds:    Tresiba  36 units q.h.s.  NovoLog 15-20 units before each meals 3 times a day   Jardiance 10 mg " "daily  Injection Technique: Good  Rotation of injection site: Yes   Previous meds tried: none    Home glucose checks: checks DEXCOM G7  CGM download and interpretation: Data was downloaded and personally reviewed by myself  CGM type:  Dexcom G7  Number of Day CGM worn:  14/14d, percentage of time CGM is active: 100%  Average blood glucose:  171, Glucose variability: 63, Glucose management indicator (GMI): 7.4%  Time in Range:  12% very high, 28% High, 58% Target Range, 1% low, <1% very low>> reviewed and discussed, goal TIR discussed with patient    Patient with nocturnal hypoglycemia occasional around 1AM, Better Time-in-range, GMI average glucose 171 decrease from 194  Still with hyperglycemia in Time-in-range  Data and Recommendation discussed with patient in clinic today  >>See CGM data scan into our system, media tab<<       Diet/Exercise:   - Eating 2-3 meals a day               - Snacking: peanut              - Drink: crystal light, diet, water  - Weight trend: stable  - Diabetes Related Hospitalization:  No  - Hx of pancreatitis: No, denies  - Family history of diabetes: Yes  - Occupation: working, slot machine installing    Eye exam current (within one year): no, DR: no, unknown  Reports cuts or ulcers on feet:   Denies,   Statin: Not taking, ACE/ARB: Taking    Diabetes lab work  Lab Results   Component Value Date    HGBA1C 6.8 (H) 04/01/2024    HGBA1C 6.9 (H) 08/25/2023    HGBA1C 8.6 (H) 05/23/2022    HGBA1C 9.7 (H) 06/16/2021     Lab Results   Component Value Date    CPEPTIDE 0.13 (L) 08/25/2023    CPEPTIDE 0.13 (L) 12/05/2018    GLUTAMICACID 18.5 (H) 08/25/2023    ISLETCELLANT <1:4 08/25/2023      No results found for: "FRUCTOSAMINE"  Lab Results   Component Value Date    MICALBCREAT 6.0 08/25/2023     No results found for: "DMUPQRLX43"    Diabetes Management Status: Reviewed this office visit  Screening or Prevention Patient's value Goal Complete/Controlled?   Lipid profile : 05/23/2022 Annually No "   Dilated retinal exam : 05/08/2023 Annually Yes   Foot exam   Most Recent Foot Exam Date: Not Found Annually No      Reviewed past surgical, medical, family, social history and updated as appropriate.  Review of Systems: see HPI above    Objective:   /84   Pulse 80   Wt 84.4 kg (186 lb)   BMI 27.47 kg/m²   Body mass index is 27.47 kg/m².  Vital signs reviewed    Physical Exam  Vitals and nursing note reviewed.   Constitutional:       Appearance: Normal appearance. He is well-developed. He is not ill-appearing.   Neck:      Thyroid: No thyromegaly.   Pulmonary:      Effort: Pulmonary effort is normal. No respiratory distress.   Musculoskeletal:         General: Normal range of motion.      Cervical back: Normal range of motion.   Neurological:      General: No focal deficit present.      Mental Status: He is alert. Mental status is at baseline.   Psychiatric:         Mood and Affect: Mood normal.         Behavior: Behavior normal.     Lab Reviewed:  See results in subjective  Lab Results   Component Value Date    HGBA1C 6.8 (H) 04/01/2024     Lab Results   Component Value Date    CHOL 197 05/23/2022    HDL 43 05/23/2022    LDLCALC 131.4 05/23/2022    TRIG 113 05/23/2022    CHOLHDL 21.8 05/23/2022     Lab Results   Component Value Date     04/01/2024    K 4.8 04/01/2024     (H) 04/01/2024    CO2 24 04/01/2024     (H) 04/01/2024    BUN 14 04/01/2024    CREATININE 0.9 04/01/2024    CALCIUM 9.4 04/01/2024    PHOS 3.0 04/01/2024    PROT 7.3 05/23/2022    ALBUMIN 4.0 04/01/2024    BILITOT 0.9 05/23/2022    ALKPHOS 109 05/23/2022    AST 17 05/23/2022    ALT 31 05/23/2022    ANIONGAP 10 04/01/2024    EGFRNORACEVR >60 04/01/2024    TSH 0.900 05/23/2022      Assessment     1. Type 1 diabetes mellitus with hyperglycemia, with long-term current use of insulin  Basic Metabolic Panel    Hemoglobin A1C    Microalbumin/Creatinine Ratio, Urine    TRESIBA FLEXTOUCH U-200 200 unit/mL (3 mL) insulin pen     "pen needle, diabetic (BD ULTRA-FINE MATA PEN NEEDLE) 32 gauge x 5/32" Ndle    DEXCOM G7 SENSOR Mary Beth    insulin aspart, niacinamide, 100 unit/mL (3 mL) InPn      2. Uncontrolled type 1 diabetes mellitus with hyperglycemia, with long-term current use of insulin        3. Type 2 diabetes mellitus with hyperglycemia, with long-term current use of insulin  empagliflozin (JARDIANCE) 25 mg tablet      4. Type 1 diabetes mellitus with microalbuminuria          Plan     Type 1 diabetes mellitus with hyperglycemia, with long-term current use of insulin  - Diabetes is at goal, given Current A1c, on Dexcom G7  - Goal A1C for patient is 7%  - Complicated by type 1 diabetes with frequent hyperglycemia and hypoglycemia  - Still with occasional nocturnal hypoglycemia and postprandial hyperglycemia.  - Diabetes goal including glucose glucose and A1C goals discussed  - Advised patient to get routine feet care, routine eye exam, plus routine maintenance screening, reviewed   - Discussed proper insulin injection technique: Including rotation of injection sites, using new insulin needles  - Diabetic supplies/medications were reviewed this visit to ensure continue steady supplies    Plan  - Given patient's type 1 diabetes status, would benefit from continue use of Dexcom G7 CGM  - Given hypoglycemia overnight, decrease Tresiba to 32 units q.h.s.  - Advised better compliance with mealtime insulin NovoLog 15-25 units before each meals 3 times a day  - Patient requesting SGLT-2 Inh 11/2023>>  Doing well, he is requesting increase dose.  Increase Jardiance 25 mg daily.  Denies any side effects  - Encouragement of dietary modification, portion size control, decreasing carbohydrates intake  - Symptoms of hypoglycemia discussed, advised in the treatment of hypoglycemia  - Follow up as scheduled    Type 1 diabetes mellitus with microalbuminuria  -  continue to benefit from SGLT-2 Inh and lisinopril    Advised patient to follow up with PCP for " routine health maintenance care.   RTC in 5 months    West Casas M.D.  Endocrinology  [facility]  4/8/2024      Disclaimer: This note has been generated in part with the use of voice-recognition software. There may be typographical errors that have been missed during proof-reading.

## 2024-04-08 NOTE — ASSESSMENT & PLAN NOTE
- Diabetes is at goal, given Current A1c, on Dexcom G7  - Goal A1C for patient is 7%  - Complicated by type 1 diabetes with frequent hyperglycemia and hypoglycemia  - Still with occasional nocturnal hypoglycemia and postprandial hyperglycemia.  - Diabetes goal including glucose glucose and A1C goals discussed  - Advised patient to get routine feet care, routine eye exam, plus routine maintenance screening, reviewed   - Discussed proper insulin injection technique: Including rotation of injection sites, using new insulin needles  - Diabetic supplies/medications were reviewed this visit to ensure continue steady supplies    Plan  - Given patient's type 1 diabetes status, would benefit from continue use of Dexcom G7 CGM  - Given hypoglycemia overnight, decrease Tresiba to 32 units q.h.s.  - Advised better compliance with mealtime insulin NovoLog 15-25 units before each meals 3 times a day  - Patient requesting SGLT-2 Inh 11/2023>>  Doing well, he is requesting increase dose.  Increase Jardiance 25 mg daily.  Denies any side effects  - Encouragement of dietary modification, portion size control, decreasing carbohydrates intake  - Symptoms of hypoglycemia discussed, advised in the treatment of hypoglycemia  - Follow up as scheduled

## 2024-06-24 DIAGNOSIS — E10.65 TYPE 1 DIABETES MELLITUS WITH HYPERGLYCEMIA, WITH LONG-TERM CURRENT USE OF INSULIN: ICD-10-CM

## 2024-06-24 RX ORDER — INSULIN ASPART INJECTION 100 [IU]/ML
25 INJECTION, SOLUTION SUBCUTANEOUS
Qty: 5 PEN | Refills: 6 | Status: SHIPPED | OUTPATIENT
Start: 2024-06-24

## 2024-07-27 DIAGNOSIS — E10.65 TYPE 1 DIABETES MELLITUS WITH HYPERGLYCEMIA, WITH LONG-TERM CURRENT USE OF INSULIN: ICD-10-CM

## 2024-07-29 RX ORDER — INSULIN DEGLUDEC 200 U/ML
40 INJECTION, SOLUTION SUBCUTANEOUS
Qty: 5 PEN | Refills: 6 | Status: SHIPPED | OUTPATIENT
Start: 2024-07-29

## 2024-09-03 ENCOUNTER — LAB VISIT (OUTPATIENT)
Dept: LAB | Facility: HOSPITAL | Age: 44
End: 2024-09-03
Attending: HOSPITALIST
Payer: COMMERCIAL

## 2024-09-03 DIAGNOSIS — E10.65 TYPE 1 DIABETES MELLITUS WITH HYPERGLYCEMIA, WITH LONG-TERM CURRENT USE OF INSULIN: ICD-10-CM

## 2024-09-03 LAB
ANION GAP SERPL CALC-SCNC: 9 MMOL/L (ref 8–16)
BUN SERPL-MCNC: 14 MG/DL (ref 6–20)
CALCIUM SERPL-MCNC: 9 MG/DL (ref 8.7–10.5)
CHLORIDE SERPL-SCNC: 107 MMOL/L (ref 95–110)
CO2 SERPL-SCNC: 24 MMOL/L (ref 23–29)
CREAT SERPL-MCNC: 0.8 MG/DL (ref 0.5–1.4)
EST. GFR  (NO RACE VARIABLE): >60 ML/MIN/1.73 M^2
ESTIMATED AVG GLUCOSE: 146 MG/DL (ref 68–131)
GLUCOSE SERPL-MCNC: 108 MG/DL (ref 70–110)
HBA1C MFR BLD: 6.7 % (ref 4–5.6)
POTASSIUM SERPL-SCNC: 3.6 MMOL/L (ref 3.5–5.1)
SODIUM SERPL-SCNC: 140 MMOL/L (ref 136–145)

## 2024-09-03 PROCEDURE — 83036 HEMOGLOBIN GLYCOSYLATED A1C: CPT | Performed by: HOSPITALIST

## 2024-09-03 PROCEDURE — 80048 BASIC METABOLIC PNL TOTAL CA: CPT | Performed by: HOSPITALIST

## 2024-09-03 PROCEDURE — 36415 COLL VENOUS BLD VENIPUNCTURE: CPT | Performed by: HOSPITALIST

## 2024-09-10 ENCOUNTER — OFFICE VISIT (OUTPATIENT)
Dept: ENDOCRINOLOGY | Facility: CLINIC | Age: 44
End: 2024-09-10
Payer: COMMERCIAL

## 2024-09-10 VITALS
SYSTOLIC BLOOD PRESSURE: 136 MMHG | DIASTOLIC BLOOD PRESSURE: 90 MMHG | BODY MASS INDEX: 26.99 KG/M2 | HEART RATE: 88 BPM | WEIGHT: 182.81 LBS

## 2024-09-10 DIAGNOSIS — E78.2 MIXED HYPERLIPIDEMIA: ICD-10-CM

## 2024-09-10 DIAGNOSIS — E10.29 TYPE 1 DIABETES MELLITUS WITH MICROALBUMINURIA: ICD-10-CM

## 2024-09-10 DIAGNOSIS — E11.65 TYPE 2 DIABETES MELLITUS WITH HYPERGLYCEMIA, WITH LONG-TERM CURRENT USE OF INSULIN: ICD-10-CM

## 2024-09-10 DIAGNOSIS — R80.9 TYPE 1 DIABETES MELLITUS WITH MICROALBUMINURIA: ICD-10-CM

## 2024-09-10 DIAGNOSIS — E10.65 TYPE 1 DIABETES MELLITUS WITH HYPERGLYCEMIA, WITH LONG-TERM CURRENT USE OF INSULIN: Primary | ICD-10-CM

## 2024-09-10 DIAGNOSIS — Z79.4 TYPE 2 DIABETES MELLITUS WITH HYPERGLYCEMIA, WITH LONG-TERM CURRENT USE OF INSULIN: ICD-10-CM

## 2024-09-10 PROCEDURE — 99214 OFFICE O/P EST MOD 30 MIN: CPT | Mod: S$GLB,,, | Performed by: HOSPITALIST

## 2024-09-10 PROCEDURE — 95251 CONT GLUC MNTR ANALYSIS I&R: CPT | Mod: S$GLB,,, | Performed by: HOSPITALIST

## 2024-09-10 PROCEDURE — 99999 PR PBB SHADOW E&M-EST. PATIENT-LVL IV: CPT | Mod: PBBFAC,,, | Performed by: HOSPITALIST

## 2024-09-10 RX ORDER — BLOOD-GLUCOSE SENSOR
EACH MISCELLANEOUS
Qty: 3 EACH | Refills: 11 | Status: SHIPPED | OUTPATIENT
Start: 2024-09-10

## 2024-09-10 RX ORDER — PEN NEEDLE, DIABETIC 30 GX3/16"
NEEDLE, DISPOSABLE MISCELLANEOUS
Qty: 150 EACH | Refills: 5 | Status: SHIPPED | OUTPATIENT
Start: 2024-09-10

## 2024-09-10 RX ORDER — INSULIN ASPART INJECTION 100 [IU]/ML
25 INJECTION, SOLUTION SUBCUTANEOUS
Qty: 5 EACH | Refills: 7 | Status: SHIPPED | OUTPATIENT
Start: 2024-09-10

## 2024-09-10 RX ORDER — INSULIN DEGLUDEC 200 U/ML
46 INJECTION, SOLUTION SUBCUTANEOUS NIGHTLY
Qty: 5 PEN | Refills: 6 | Status: SHIPPED | OUTPATIENT
Start: 2024-09-10

## 2024-09-10 NOTE — PATIENT INSTRUCTIONS
Tresiba 30 units nightly  Novolog/Insulin Aspart: 14-25 units before each meals 3 times a day  Jardiance 25mg daily      DEXCOM G7

## 2024-09-10 NOTE — ASSESSMENT & PLAN NOTE
- Diabetes is AT goal, as indicated by the most recent A1C reviewed on 9/10/2024.  - Therapy Goals: Discussed the aim to achieve optimal control without causing hypoglycemia, targeting an A1C of <7%.  - CGM: Dexcom data was downloaded, personally interpreted, and reviewed with the patient during this visit. See adjustments noted below.  - Diabetic Supplies and Medications: Reviewed to ensure continued refills and compliance.  - Preventive Care: Advised the patient to schedule periodic eye exams and maintain regular foot care monitoring.  - Annual Monitoring: Reviewed the importance of yearly lipid profile (with statin use) and urine protein/creatinine tests.  - Complicated by type 1 diabetes with frequent hyperglycemia and hypoglycemia  - Still with occasional nocturnal hypoglycemia and postprandial hyperglycemia.    Plan  - Medication changes:  Tresiba 30 units q.h.s.  - Advised better compliance with mealtime insulin NovoLog 15-25 units before each meals 3 times a day  - continue Jardiance 25 mg daily  - Dietary Modifications: Encouraged portion size control and reduction of carbohydrate intake to better manage diabetes  - Continued benefit from Dexcom ; refills provided.  - Clear written instructions provided on AVS. Follow-up scheduled within the next 3-6 months with lab work prior.   - Appointment date and time were provided to the patient today.

## 2024-09-10 NOTE — PROGRESS NOTES
"Subjective:      Patient ID: Jay Meyer is a 44 y.o. male presented to [facility] on 9/10/2024.  Chief Complaint:  Diabetes    History of Present Illness: Jay Meyer is a 44 y.o. male here for  type 1 diabetes  No other significant past medical history      Diabetes Mellitus Type 1  - Known diabetic complications: none  - Diagnosed w/ DM: in 2003 at age 22  - Diabetes Education: No  - Please seen by Ina Nunn NP in 2018 at that visit:  "He was largely unengaged and answered most questions with a short yes/no unless otherwise prompted for more detail"  - Insulin started at the time of diagnosis. He has been on a Medtronic insulin pump and a CGM for a couple of years under Dr. Baeza's care before stopping it in 2016 because it kept getting detached.   - initially saw 1st time seen by 6/2023: Has not seen by endocrinology for many years. Patient reports multiple family members with type 1 diabetes including brother, father.  Also grandparents with diabetes. On MDI insulin.  With poor compliance to mealtime insulin, skipping mealtime NovoLog at lunch on occasion. In clinic glucose check: 258, after eating breakfast  - On 11/7/2023: patient request SGLT-2 Inh, given insulin resistant status, started Jardiance 10 mg daily as trial, side effect profile discussed. Advised patient to stop immediately if having any sign of ketoacidosis. Including nausea/vomiting. Hx of diabetes nephropathy noted in 2018. No issue in 2024       Interval history: Patient is here for follow-up type 1 diabetes, A1c stable at 6.7%  Currently on Dexcom G7, enjoying the device  On Jardiance without any issues.   Patient reports insulin compliance with Tresiba and NovoLog.  No issues.  Patient reports that he is not monitoring his diet, dietary indiscretion leading to hyperglycemia.  Did go 1 day without giving his insulin injection leading to hyperglycemia.    Still with the occasional lows " "overnight.        Current reported meds:   Tresiba to 32 units q.h.s.  NovoLog 15-25 units before each meals 3 times a day  Jardiance 25 mg daily.    Previous meds tried: none  Home glucose checks: checks DEXCOM G7  CGM download and interpretation: Data was downloaded and personally reviewed by myself  CGM type:  Dexcom G7  Number of Day CGM worn:  13/14d, percentage of time CGM is active: 93  Average blood glucose:  173, Glucose variability: 76, Glucose management indicator (GMI): 7.4%  Time in Range:  16% very high, 26% High, 58% Target Range, 2% low, <1% very low>> reviewed and discussed, goal TIR discussed with patient    Patient with nocturnal hypoglycemia occasional around 1AM, still with average glucose of 173, 56% in range, still with 26% highs/16% very high, predominantly postprandial notably around lunchtime.  Data and Recommendation discussed with patient in clinic today  >>See CGM data scan into our system, media tab<<         Diet/Exercise:   - Eating 2-3 meals a day               - Snacking: peanut              - Drink: crystal light, diet, water  - Weight trend: stable  - Diabetes Related Hospitalization:  No  - Hx of pancreatitis: No, denies  - Family history of diabetes: Yes  - Occupation: working, slot machine installing    Eye exam current (within one year): no, DR: no, unknown  Reports cuts or ulcers on feet:   Denies,   Statin: Not taking, ACE/ARB: Taking    Diabetes lab work  Lab Results   Component Value Date    HGBA1C 6.7 (H) 09/03/2024    HGBA1C 6.8 (H) 04/01/2024    HGBA1C 6.9 (H) 08/25/2023    HGBA1C 8.6 (H) 05/23/2022     Lab Results   Component Value Date    CPEPTIDE 0.13 (L) 08/25/2023    CPEPTIDE 0.13 (L) 12/05/2018    GLUTAMICACID 18.5 (H) 08/25/2023    ISLETCELLANT <1:4 08/25/2023      No results found for: "FRUCTOSAMINE"  Lab Results   Component Value Date    MICALBCREAT 4.7 09/03/2024     No results found for: "OXTGONKD97"    Diabetes Management Status: Reviewed this office " visit  Screening or Prevention Patient's value Goal Complete/Controlled?   Lipid profile : 05/23/2022 Annually No   Dilated retinal exam : 03/04/2024 Annually Yes   Foot exam   Most Recent Foot Exam Date: Not Found Annually No      Reviewed past surgical, medical, family, social history and updated as appropriate.  Review of Systems: see HPI above    Objective:   BP (!) 136/90   Pulse 88   Wt 82.9 kg (182 lb 12.8 oz)   BMI 26.99 kg/m²   Body mass index is 26.99 kg/m².  Vital signs reviewed    Physical Exam  Vitals and nursing note reviewed.   Constitutional:       Appearance: Normal appearance. He is well-developed. He is not ill-appearing.   Neck:      Thyroid: No thyromegaly.   Pulmonary:      Effort: Pulmonary effort is normal. No respiratory distress.   Musculoskeletal:         General: Normal range of motion.      Cervical back: Normal range of motion.   Neurological:      General: No focal deficit present.      Mental Status: He is alert. Mental status is at baseline.   Psychiatric:         Mood and Affect: Mood normal.         Behavior: Behavior normal.     Lab Reviewed:  See results in subjective  Lab Results   Component Value Date    HGBA1C 6.7 (H) 09/03/2024     Lab Results   Component Value Date    CHOL 197 05/23/2022    HDL 43 05/23/2022    LDLCALC 131.4 05/23/2022    TRIG 113 05/23/2022    CHOLHDL 21.8 05/23/2022     Lab Results   Component Value Date     09/03/2024    K 3.6 09/03/2024     09/03/2024    CO2 24 09/03/2024     09/03/2024    BUN 14 09/03/2024    CREATININE 0.8 09/03/2024    CALCIUM 9.0 09/03/2024    PHOS 3.0 04/01/2024    PROT 7.3 05/23/2022    ALBUMIN 4.0 04/01/2024    BILITOT 0.9 05/23/2022    ALKPHOS 109 05/23/2022    AST 17 05/23/2022    ALT 31 05/23/2022    ANIONGAP 9 09/03/2024    EGFRNORACEVR >60 09/03/2024    TSH 0.900 05/23/2022      Assessment     1. Type 1 diabetes mellitus with hyperglycemia, with long-term current use of insulin  TRESIBA FLEXTOUCH U-200  "200 unit/mL (3 mL) insulin pen    insulin aspart, niacinamide, (FIASP FLEXTOUCH U-100 INSULIN) 100 unit/mL (3 mL) InPn    pen needle, diabetic (BD ULTRA-FINE MATA PEN NEEDLE) 32 gauge x 5/32" Ndle    DEXCOM G7 SENSOR Mary Beth    Lipid Panel    Basic Metabolic Panel    Hemoglobin A1C      2. Type 2 diabetes mellitus with hyperglycemia, with long-term current use of insulin  empagliflozin (JARDIANCE) 25 mg tablet      3. Mixed hyperlipidemia  Lipid Panel      4. Type 1 diabetes mellitus with microalbuminuria          Plan     Type 1 diabetes mellitus with hyperglycemia, with long-term current use of insulin  - Diabetes is AT goal, as indicated by the most recent A1C reviewed on 9/10/2024.  - Therapy Goals: Discussed the aim to achieve optimal control without causing hypoglycemia, targeting an A1C of <7%.  - CGM: Dexcom data was downloaded, personally interpreted, and reviewed with the patient during this visit. See adjustments noted below.  - Diabetic Supplies and Medications: Reviewed to ensure continued refills and compliance.  - Preventive Care: Advised the patient to schedule periodic eye exams and maintain regular foot care monitoring.  - Annual Monitoring: Reviewed the importance of yearly lipid profile (with statin use) and urine protein/creatinine tests.  - Complicated by type 1 diabetes with frequent hyperglycemia and hypoglycemia  - Still with occasional nocturnal hypoglycemia and postprandial hyperglycemia.    Plan  - Medication changes:  Tresiba 30 units q.h.s.  - Advised better compliance with mealtime insulin NovoLog 15-25 units before each meals 3 times a day  - continue Jardiance 25 mg daily  - Dietary Modifications: Encouraged portion size control and reduction of carbohydrate intake to better manage diabetes  - Continued benefit from Dexcom ; refills provided.  - Clear written instructions provided on AVS. Follow-up scheduled within the next 3-6 months with lab work prior.   - Appointment date and time " were provided to the patient today.    Type 1 diabetes mellitus with microalbuminuria  -  continue to benefit from SGLT-2 Inh and lisinopril    Advised patient to follow up with PCP for routine health maintenance care.   RTC in 5 months    West Casas M.D.  Endocrinology  [facility]  9/10/2024      Disclaimer: This note has been generated in part with the use of voice-recognition software. There may be typographical errors that have been missed during proof-reading.

## 2025-03-03 ENCOUNTER — LAB VISIT (OUTPATIENT)
Dept: LAB | Facility: HOSPITAL | Age: 45
End: 2025-03-03
Attending: HOSPITALIST
Payer: COMMERCIAL

## 2025-03-03 DIAGNOSIS — E78.2 MIXED HYPERLIPIDEMIA: ICD-10-CM

## 2025-03-03 DIAGNOSIS — E10.65 TYPE 1 DIABETES MELLITUS WITH HYPERGLYCEMIA, WITH LONG-TERM CURRENT USE OF INSULIN: ICD-10-CM

## 2025-03-03 LAB
ANION GAP SERPL CALC-SCNC: 9 MMOL/L (ref 8–16)
BUN SERPL-MCNC: 18 MG/DL (ref 6–20)
CALCIUM SERPL-MCNC: 8.7 MG/DL (ref 8.7–10.5)
CHLORIDE SERPL-SCNC: 108 MMOL/L (ref 95–110)
CHOLEST SERPL-MCNC: 179 MG/DL (ref 120–199)
CHOLEST/HDLC SERPL: 5.1 {RATIO} (ref 2–5)
CO2 SERPL-SCNC: 24 MMOL/L (ref 23–29)
CREAT SERPL-MCNC: 0.8 MG/DL (ref 0.5–1.4)
EST. GFR  (NO RACE VARIABLE): >60 ML/MIN/1.73 M^2
ESTIMATED AVG GLUCOSE: 160 MG/DL (ref 68–131)
GLUCOSE SERPL-MCNC: 86 MG/DL (ref 70–110)
HBA1C MFR BLD: 7.2 % (ref 4–5.6)
HDLC SERPL-MCNC: 35 MG/DL (ref 40–75)
HDLC SERPL: 19.6 % (ref 20–50)
LDLC SERPL CALC-MCNC: 105.6 MG/DL (ref 63–159)
NONHDLC SERPL-MCNC: 144 MG/DL
POTASSIUM SERPL-SCNC: 4 MMOL/L (ref 3.5–5.1)
SODIUM SERPL-SCNC: 141 MMOL/L (ref 136–145)
TRIGL SERPL-MCNC: 192 MG/DL (ref 30–150)

## 2025-03-03 PROCEDURE — 36415 COLL VENOUS BLD VENIPUNCTURE: CPT | Performed by: HOSPITALIST

## 2025-03-03 PROCEDURE — 83036 HEMOGLOBIN GLYCOSYLATED A1C: CPT | Performed by: HOSPITALIST

## 2025-03-03 PROCEDURE — 80048 BASIC METABOLIC PNL TOTAL CA: CPT | Performed by: HOSPITALIST

## 2025-03-03 PROCEDURE — 80061 LIPID PANEL: CPT | Performed by: HOSPITALIST

## 2025-03-10 ENCOUNTER — OFFICE VISIT (OUTPATIENT)
Dept: ENDOCRINOLOGY | Facility: CLINIC | Age: 45
End: 2025-03-10
Payer: COMMERCIAL

## 2025-03-10 VITALS
SYSTOLIC BLOOD PRESSURE: 102 MMHG | DIASTOLIC BLOOD PRESSURE: 80 MMHG | HEART RATE: 82 BPM | HEIGHT: 69 IN | WEIGHT: 182.63 LBS | BODY MASS INDEX: 27.05 KG/M2

## 2025-03-10 DIAGNOSIS — Z79.4 TYPE 2 DIABETES MELLITUS WITH HYPERGLYCEMIA, WITH LONG-TERM CURRENT USE OF INSULIN: ICD-10-CM

## 2025-03-10 DIAGNOSIS — E10.65 TYPE 1 DIABETES MELLITUS WITH HYPERGLYCEMIA, WITH LONG-TERM CURRENT USE OF INSULIN: Primary | ICD-10-CM

## 2025-03-10 DIAGNOSIS — E11.65 TYPE 2 DIABETES MELLITUS WITH HYPERGLYCEMIA, WITH LONG-TERM CURRENT USE OF INSULIN: ICD-10-CM

## 2025-03-10 PROCEDURE — 99999 PR PBB SHADOW E&M-EST. PATIENT-LVL III: CPT | Mod: PBBFAC,,, | Performed by: HOSPITALIST

## 2025-03-10 RX ORDER — INSULIN DEGLUDEC 200 U/ML
40 INJECTION, SOLUTION SUBCUTANEOUS NIGHTLY
Qty: 5 PEN | Refills: 6 | Status: SHIPPED | OUTPATIENT
Start: 2025-03-10

## 2025-03-10 RX ORDER — TIRZEPATIDE 2.5 MG/.5ML
2.5 INJECTION, SOLUTION SUBCUTANEOUS
Qty: 4 PEN | Refills: 5 | Status: SHIPPED | OUTPATIENT
Start: 2025-03-10

## 2025-03-10 RX ORDER — BLOOD-GLUCOSE SENSOR
EACH MISCELLANEOUS
Qty: 9 EACH | Refills: 3 | Status: SHIPPED | OUTPATIENT
Start: 2025-03-10

## 2025-03-10 RX ORDER — INSULIN ASPART INJECTION 100 [IU]/ML
25 INJECTION, SOLUTION SUBCUTANEOUS
Qty: 5 EACH | Refills: 7 | Status: SHIPPED | OUTPATIENT
Start: 2025-03-10

## 2025-03-10 RX ORDER — PEN NEEDLE, DIABETIC 30 GX3/16"
NEEDLE, DISPOSABLE MISCELLANEOUS
Qty: 150 EACH | Refills: 5 | Status: SHIPPED | OUTPATIENT
Start: 2025-03-10

## 2025-03-10 NOTE — ASSESSMENT & PLAN NOTE
- insulin resistant, obesity, doing well with Jardiance 25 mg  - will attempt to get Mounjaro once a week injection  - patient was taught how to give Mounjaro injection in clinic today  - GI symptoms discussed, monitor

## 2025-03-10 NOTE — PROGRESS NOTES
"Subjective:      Patient ID: Jay Meyer is a 44 y.o. male presented to [facility] on 3/10/2025.  Chief Complaint:  Diabetes    History of Present Illness: Jay Meyer is a 44 y.o. male here for  type 1 diabetes  No other significant past medical history      Diabetes Mellitus Type 1  - Known diabetic complications: none  - Diagnosed w/ DM: in 2003 at age 22  - Diabetes Education: No  - Please seen by Ina Nunn NP in 2018 at that visit:  "He was largely unengaged and answered most questions with a short yes/no unless otherwise prompted for more detail"  - Insulin started at the time of diagnosis. He has been on a [employer] insulin pump and a CGM for a couple of years under Dr. Baeza's care before stopping it in 2016 because it kept getting detached.   - initially saw 1st time seen by 6/2023: Has not seen by endocrinology for many years. Patient reports multiple family members with type 1 diabetes including brother, father.  Also grandparents with diabetes. On MDI insulin.  With poor compliance to mealtime insulin, skipping mealtime NovoLog at lunch on occasion. In clinic glucose check: 258, after eating breakfast  - On 11/7/2023: patient request SGLT-2 Inh, given insulin resistant status, started Jardiance 10 mg daily as trial, side effect profile discussed. Advised patient to stop immediately if having any sign of ketoacidosis. Including nausea/vomiting. Hx of diabetes nephropathy noted in 2018. No issue in 2024       Interval history: Patient is here for follow-up type 1 diabetes, A1c increase at 7.2%,  Currently on Dexcom G7, enjoying the device  On Jardiance without any issues.   Patient reports insulin compliance with Tresiba and NovoLog.  Does have dietary indiscretion leading to postprandial hyperglycemia with lunch and dinner.  But does not skip insulin  Patient reports that he is not monitoring his diet, dietary indiscretion leading to hyperglycemia.      Current " "reported meds:    Tresiba 30 units q.h.s.  NovoLog 15-25 units before each meals 3 times a day  Jardiance 25 mg daily   Previous meds tried: none  Home glucose checks: checks DEXCOM G7  CGM download and interpretation: Data was downloaded and personally reviewed by myself  CGM type:  Dexcom G7  Number of Day CGM worn:  13/14d, percentage of time CGM is active: 95%  Average blood glucose: 177, Glucose variability: 72, Glucose management indicator (GMI): 7.5%  Time in Range: 17% very high, 23% High, 59% Target Range, 1% low, 0% very low>> reviewed and discussed, goal TIR discussed with patient    Patient with hyperglycemia, average glucose 177, global hyperglycemia noted with lunch and dinner, does have good glucose control days when he is working, with very good glucose control, dietary indiscretion leading to hyperglycemia.  No obvious hypoglycemic event  Data and Recommendation discussed with patient in clinic today  >>See CGM data scan into our system, media tab<<         Diet/Exercise:   - Eating 2-3 meals a day               - Snacking: peanut              - Drink: crystal light, diet, water  - Weight trend: stable  - Diabetes Related Hospitalization:  No  - Hx of pancreatitis: No, denies  - Family history of diabetes: Yes  - Occupation: working, slot machine installing    Eye exam current (within one year): no, DR: no, unknown  Reports cuts or ulcers on feet:   Denies,   Statin: Not taking, ACE/ARB: Taking    Diabetes lab work  Lab Results   Component Value Date    HGBA1C 7.2 (H) 03/03/2025    HGBA1C 6.7 (H) 09/03/2024    HGBA1C 6.8 (H) 04/01/2024    HGBA1C 6.9 (H) 08/25/2023     Lab Results   Component Value Date    CPEPTIDE 0.13 (L) 08/25/2023    CPEPTIDE 0.13 (L) 12/05/2018    GLUTAMICACID 18.5 (H) 08/25/2023    ISLETCELLANT <1:4 08/25/2023      No results found for: "FRUCTOSAMINE"  Lab Results   Component Value Date    MICALBCREAT 4.7 09/03/2024     No results found for: "[id number]"    Diabetes Management " "Status: Reviewed this office visit  Screening or Prevention Patient's value Goal Complete/Controlled?   Lipid profile : 03/03/2025 Annually No   Dilated retinal exam : 03/04/2024 Annually Yes   Foot exam   Most Recent Foot Exam Date: Not Found Annually No      Reviewed past surgical, medical, family, social history and updated as appropriate.  Review of Systems: see HPI above    Objective:   /80   Pulse 82   Ht 5' 9" (1.753 m)   Wt 82.8 kg (182 lb 9.6 oz)   BMI 26.97 kg/m²   Body mass index is 26.97 kg/m².  Vital signs reviewed    Physical Exam  Vitals and nursing note reviewed.   Constitutional:       Appearance: Normal appearance. He is well-developed. He is not ill-appearing.   Neck:      Thyroid: No thyromegaly.   Pulmonary:      Effort: Pulmonary effort is normal. No respiratory distress.   Musculoskeletal:         General: Normal range of motion.      Cervical back: Normal range of motion.   Neurological:      General: No focal deficit present.      Mental Status: He is alert. Mental status is at baseline.   Psychiatric:         Mood and Affect: Mood normal.         Behavior: Behavior normal.     Lab Reviewed:  See results in subjective  Lab Results   Component Value Date    HGBA1C 7.2 (H) 03/03/2025     Lab Results   Component Value Date    CHOL 179 03/03/2025    HDL 35 (L) 03/03/2025    LDLCALC 105.6 03/03/2025    TRIG 192 (H) 03/03/2025    CHOLHDL 19.6 (L) 03/03/2025     Lab Results   Component Value Date     03/03/2025    K 4.0 03/03/2025     03/03/2025    CO2 24 03/03/2025    GLU 86 03/03/2025    BUN 18 03/03/2025    CREATININE 0.8 03/03/2025    CALCIUM 8.7 03/03/2025    PHOS 3.0 04/01/2024    PROT 7.3 05/23/2022    ALBUMIN 4.0 04/01/2024    BILITOT 0.9 05/23/2022    ALKPHOS 109 05/23/2022    AST 17 05/23/2022    ALT 31 05/23/2022    ANIONGAP 9 03/03/2025    EGFRNORACEVR >60 03/03/2025    TSH 0.900 05/23/2022      Assessment     1. Type 1 diabetes mellitus with hyperglycemia, with " "long-term current use of insulin  insulin aspart, niacinamide, (FIASP FLEXTOUCH U-100 INSULIN) 100 unit/mL (3 mL) InPn    TRESIBA FLEXTOUCH U-200 200 unit/mL (3 mL) insulin pen    pen needle, diabetic (BD ULTRA-FINE MATA PEN NEEDLE) 32 gauge x 5/32" Ndle    DEXCOM G7 SENSOR Mary Beth    Hemoglobin A1C    Basic Metabolic Panel      2. Type 2 diabetes mellitus with hyperglycemia, with long-term current use of insulin  MOUNJARO 2.5 mg/0.5 mL PnIj    Microalbumin/Creatinine Ratio, Urine        Plan     Type 1 diabetes mellitus with hyperglycemia, with long-term current use of insulin  - Diabetes is AT goal, as indicated by the most recent A1C reviewed on 3/10/2025.  - Therapy Goals: Discussed the aim to achieve optimal control without causing hypoglycemia, targeting an A1C of <7%.  - CGM: Dexcom data was downloaded, personally interpreted, and reviewed with the patient during this visit. See adjustments noted below.  - Diabetic Supplies and Medications: Reviewed to ensure continued refills and compliance.  - Preventive Care: Advised the patient to schedule periodic eye exams and maintain regular foot care monitoring.  - Annual Monitoring: Reviewed the importance of yearly lipid profile (with statin use) and urine protein/creatinine tests.  - Complicated by type 1 diabetes with frequent hyperglycemia and hypoglycemia  - insulin resistant, obesity, doing well with Jardiance, will attempt to get Mounjaro    Plan  - Medication changes:  Tresiba 30 units q.h.s.  - Advised better compliance with mealtime insulin NovoLog 15-25 units before each meals 3 times a day  - continue Jardiance 25 mg daily  - insulin resistant, obesity, doing well with Jardiance, will attempt to get Mounjaro  - Dietary Modifications: Encouraged portion size control and reduction of carbohydrate intake to better manage diabetes  - Continued benefit from Dexcom ; refills provided.  - Clear written instructions provided on AVS. Follow-up scheduled within the " next 3-6 months with lab work prior.   - Appointment date and time were provided to the patient today.    Type 2 diabetes mellitus with hyperglycemia, with long-term current use of insulin  - insulin resistant, obesity, doing well with Jardiance 25 mg  - will attempt to get Mounjaro once a week injection  - patient was taught how to give Mounjaro injection in clinic today  - GI symptoms discussed, monitor      Advised patient to follow up with PCP for routine health maintenance care.   RTC in 5 months    West Casas M.D.  Endocrinology  [facility]  3/10/2025      Disclaimer: This note has been generated in part with the use of voice-recognition software. There may be typographical errors that have been missed during proof-reading.

## 2025-03-10 NOTE — ASSESSMENT & PLAN NOTE
- Diabetes is AT goal, as indicated by the most recent A1C reviewed on 3/10/2025.  - Therapy Goals: Discussed the aim to achieve optimal control without causing hypoglycemia, targeting an A1C of <7%.  - CGM: Dexcom data was downloaded, personally interpreted, and reviewed with the patient during this visit. See adjustments noted below.  - Diabetic Supplies and Medications: Reviewed to ensure continued refills and compliance.  - Preventive Care: Advised the patient to schedule periodic eye exams and maintain regular foot care monitoring.  - Annual Monitoring: Reviewed the importance of yearly lipid profile (with statin use) and urine protein/creatinine tests.  - Complicated by type 1 diabetes with frequent hyperglycemia and hypoglycemia  - insulin resistant, obesity, doing well with Jardiance, will attempt to get Mounjaro    Plan  - Medication changes:  Tresiba 30 units q.h.s.  - Advised better compliance with mealtime insulin NovoLog 15-25 units before each meals 3 times a day  - continue Jardiance 25 mg daily  - insulin resistant, obesity, doing well with Jardiance, will attempt to get Mounjaro  - Dietary Modifications: Encouraged portion size control and reduction of carbohydrate intake to better manage diabetes  - Continued benefit from Dexcom ; refills provided.  - Clear written instructions provided on AVS. Follow-up scheduled within the next 3-6 months with lab work prior.   - Appointment date and time were provided to the patient today.

## 2025-03-10 NOTE — PATIENT INSTRUCTIONS
Tresiba 30 units nightly  Novolog/Insulin Aspart: 14-25 units before each meals 3 times a day  Jardiance 25mg daily  for now  ATTEMPT TO GET MOUNJARO 2.5mg once a week injection (ok to hold Jardiance while on this medication)    DEXCOM G7

## 2025-03-21 ENCOUNTER — PATIENT OUTREACH (OUTPATIENT)
Dept: ADMINISTRATIVE | Facility: HOSPITAL | Age: 45
End: 2025-03-21
Payer: COMMERCIAL

## 2025-03-21 NOTE — PROGRESS NOTES
Population Health Chart Review & Patient Outreach Details      Additional Pop Health Notes:    HM and immunization's reviewed and updated.  Health Maintenance Due   Topic Date Due    Pneumococcal Vaccines (Age 0-49) (1 of 2 - PCV) Never done    Low Dose Statin  Never done    Foot Exam  06/25/2020    Influenza Vaccine (1) Never done    COVID-19 Vaccine (2 - 2024-25 season) 09/01/2024    Diabetic Eye Exam  03/04/2025   NEED TO OFFER EYE CAM AT VISIT IF INTERESTED & MEET CRITERIA.    Due for a visit with Izabela Currie MD. Considered as new patient if scheduled after 5/23/2025.  If no longer PCP need to remove and update new provider/facility.     Patient decline notified to call back when ready.

## 2025-04-30 ENCOUNTER — PATIENT MESSAGE (OUTPATIENT)
Dept: ENDOCRINOLOGY | Facility: CLINIC | Age: 45
End: 2025-04-30
Payer: COMMERCIAL

## 2025-04-30 DIAGNOSIS — E11.65 TYPE 2 DIABETES MELLITUS WITH HYPERGLYCEMIA, WITH LONG-TERM CURRENT USE OF INSULIN: Primary | ICD-10-CM

## 2025-04-30 DIAGNOSIS — Z79.4 TYPE 2 DIABETES MELLITUS WITH HYPERGLYCEMIA, WITH LONG-TERM CURRENT USE OF INSULIN: Primary | ICD-10-CM

## 2025-04-30 RX ORDER — TIRZEPATIDE 5 MG/.5ML
5 INJECTION, SOLUTION SUBCUTANEOUS
Qty: 4 PEN | Refills: 6 | Status: SHIPPED | OUTPATIENT
Start: 2025-04-30

## 2025-05-09 ENCOUNTER — OFFICE VISIT (OUTPATIENT)
Dept: FAMILY MEDICINE | Facility: CLINIC | Age: 45
End: 2025-05-09
Payer: COMMERCIAL

## 2025-05-09 VITALS
WEIGHT: 172.63 LBS | BODY MASS INDEX: 25.57 KG/M2 | HEIGHT: 69 IN | TEMPERATURE: 98 F | HEART RATE: 87 BPM | DIASTOLIC BLOOD PRESSURE: 70 MMHG | SYSTOLIC BLOOD PRESSURE: 126 MMHG | OXYGEN SATURATION: 99 %

## 2025-05-09 DIAGNOSIS — Z00.00 ANNUAL PHYSICAL EXAM: Primary | ICD-10-CM

## 2025-05-09 DIAGNOSIS — E10.69 HYPERLIPIDEMIA DUE TO TYPE 1 DIABETES MELLITUS: ICD-10-CM

## 2025-05-09 DIAGNOSIS — E78.5 HYPERLIPIDEMIA DUE TO TYPE 1 DIABETES MELLITUS: ICD-10-CM

## 2025-05-09 PROCEDURE — 99999 PR PBB SHADOW E&M-EST. PATIENT-LVL IV: CPT | Mod: PBBFAC,,, | Performed by: INTERNAL MEDICINE

## 2025-05-09 RX ORDER — PRAVASTATIN SODIUM 10 MG/1
10 TABLET ORAL DAILY
Qty: 90 TABLET | Refills: 3 | Status: SHIPPED | OUTPATIENT
Start: 2025-05-09 | End: 2026-05-09

## 2025-05-09 NOTE — PROGRESS NOTES
SUBJECTIVE     Chief Complaint   Patient presents with    Annual Exam       HPI  Jay Meyer is a 44 y.o. male with multiple medical diagnoses as listed in the medical history and problem list that presents for annual exam. Pt has been doing well since his last visit. He has a good appetite and eats well. He does not exercise. He sleeps for ~5-6 hours nightly. Pt does not take OTC supplements. He does not have any current stressors.     PAST MEDICAL HISTORY:  Past Medical History:   Diagnosis Date    Diabetes mellitus type I     since 2003; dx at 22       PAST SURGICAL HISTORY:  Past Surgical History:   Procedure Laterality Date    VASECTOMY         SOCIAL HISTORY:  Social History[1]    FAMILY HISTORY:  Family History   Problem Relation Name Age of Onset    Diabetes Father      Diabetes Brother      Vision loss Brother      Diabetes type II Paternal Grandfather      Heart attack Paternal Grandfather      Diabetes type II Brother         ALLERGIES AND MEDICATIONS: updated and reviewed.  Review of patient's allergies indicates:   Allergen Reactions    Lipitor [atorvastatin]      Hand spasms     Current Medications[2]    ROS  Review of Systems   Constitutional:  Negative for chills and fever.   HENT:  Negative for hearing loss and sore throat.    Eyes:  Negative for visual disturbance.   Respiratory:  Negative for cough and shortness of breath.    Cardiovascular:  Negative for chest pain, palpitations and leg swelling.   Gastrointestinal:  Negative for abdominal pain, constipation, diarrhea, nausea and vomiting.   Genitourinary:  Negative for dysuria, frequency and urgency.   Musculoskeletal:  Negative for arthralgias, joint swelling and myalgias.   Skin:  Negative for rash and wound.   Neurological:  Negative for headaches.   Psychiatric/Behavioral:  Negative for agitation and confusion. The patient is not nervous/anxious.          OBJECTIVE     Physical Exam  Vitals:    05/09/25 1345   BP: 126/70   Pulse: 87  "  Temp: 98.1 °F (36.7 °C)    Body mass index is 25.49 kg/m².  Weight: 78.3 kg (172 lb 9.9 oz)   Height: 5' 9" (175.3 cm)     Physical Exam  Constitutional:       General: He is not in acute distress.     Appearance: He is well-developed.   HENT:      Head: Normocephalic and atraumatic.      Right Ear: Tympanic membrane, ear canal and external ear normal.      Left Ear: Tympanic membrane, ear canal and external ear normal.      Nose: Nose normal.   Eyes:      General: No scleral icterus.        Right eye: No discharge.         Left eye: No discharge.      Conjunctiva/sclera: Conjunctivae normal.   Neck:      Vascular: No JVD.      Trachea: No tracheal deviation.   Cardiovascular:      Rate and Rhythm: Normal rate and regular rhythm.      Heart sounds: Normal heart sounds. No murmur heard.     No friction rub. No gallop.   Pulmonary:      Effort: Pulmonary effort is normal. No respiratory distress.      Breath sounds: Normal breath sounds. No wheezing.   Abdominal:      General: Bowel sounds are normal. There is no distension.      Palpations: Abdomen is soft. There is no mass.      Tenderness: There is no abdominal tenderness. There is no guarding or rebound.   Musculoskeletal:         General: No tenderness or deformity. Normal range of motion.      Cervical back: Normal range of motion and neck supple.   Skin:     General: Skin is warm and dry.      Findings: No erythema or rash.   Neurological:      Mental Status: He is alert and oriented to person, place, and time.      Motor: No abnormal muscle tone.      Coordination: Coordination normal.   Psychiatric:         Behavior: Behavior normal.         Thought Content: Thought content normal.         Judgment: Judgment normal.           Health Maintenance         Date Due Completion Date    Pneumococcal Vaccines (Age 0-49) (1 of 2 - PCV) Never done ---    Foot Exam 06/25/2020 6/25/2019 (Done)    Override on 6/25/2019: Done    COVID-19 Vaccine (2 - 2024-25 season) " 09/01/2024 7/28/2021    Diabetic Eye Exam 03/04/2025 3/4/2024 (Done)    Override on 3/4/2024: Done    Override on 5/8/2023: Done    HIV Screening 06/16/2027 (Originally [birth year]/4/1996) ---    Influenza Vaccine (Season Ended) 09/01/2025 ---    Diabetes Urine Screening 09/03/2025 9/3/2024    Hemoglobin A1c 09/03/2025 3/3/2025    Override on 5/17/2016: Done (8.4  dr wise)    Lipid Panel 03/03/2026 3/3/2025    Low Dose Statin 05/09/2026 5/9/2025    TETANUS VACCINE 01/19/2032 1/19/2022    RSV Vaccine (Age 60+ and Pregnant patients) (1 - 1-dose 75+ series) 07/04/2056 ---              ASSESSMENT     44 y.o. male with     1. Annual physical exam    2. Hyperlipidemia due to type 1 diabetes mellitus        PLAN:     1. Annual physical exam  - Counseled on age appropriate medical preventative services, including age appropriate cancer screenings, over all nutritional health, need for a consistent exercise regimen and an over all push towards maintaining a vigorous and active lifestyle.  Counseled on age appropriate vaccines and discussed upcoming health care needs based on age/gender.  Spent time with patient counseling on need for a good patient/doctor relationship moving forward.  Discussed use of common OTC medications and supplements.  Discussed common dietary aids and use of caffeine and the need for good sleep hygiene and stress management.  - CBC Auto Differential; Future  - TSH; Future    2. Hyperlipidemia due to type 1 diabetes mellitus  - Start trial pravastatin as patient previously unable to tolerate Lipitor  - pravastatin (PRAVACHOL) 10 MG tablet; Take 1 tablet (10 mg total) by mouth once daily.  Dispense: 90 tablet; Refill: 3        RTC in 1 year     Izabela Currie MD  05/09/2025 2:09 PM        No follow-ups on file.                       [1]   Social History  Socioeconomic History    Marital status:    Occupational History     Comment:    Tobacco Use    Smoking status: Former     Passive  exposure: Never    Smokeless tobacco: Current     Types: Snuff   Substance and Sexual Activity    Alcohol use: Yes     Alcohol/week: 2.0 standard drinks of alcohol     Types: 2 Cans of beer per week    Drug use: Not Currently    Sexual activity: Yes     Partners: Female     Social Drivers of Health     Financial Resource Strain: Medium Risk (3/3/2025)    Overall Financial Resource Strain (CARDIA)     Difficulty of Paying Living Expenses: Somewhat hard   Food Insecurity: Food Insecurity Present (3/3/2025)    Hunger Vital Sign     Worried About Running Out of Food in the Last Year: Sometimes true     Ran Out of Food in the Last Year: Sometimes true   Transportation Needs: Patient Declined (3/3/2025)    PRAPARE - Transportation     Lack of Transportation (Medical): Patient declined     Lack of Transportation (Non-Medical): Patient declined   Physical Activity: Sufficiently Active (3/3/2025)    Exercise Vital Sign     Days of Exercise per Week: 5 days     Minutes of Exercise per Session: 30 min   Stress: Stress Concern Present (3/3/2025)    [employer] [place] - Occupational Stress Questionnaire     Feeling of Stress : To some extent   Housing Stability: Patient Declined (3/3/2025)    Housing Stability Vital Sign     Unable to Pay for Housing in the Last Year: Patient declined     Homeless in the Last Year: Patient declined   [2]   Current Outpatient Medications   Medication Sig Dispense Refill    azelastine (ASTELIN) 137 mcg (0.1 %) nasal spray USE 1 SPRAY INTO EACH NOSTRIL TWICE A DAY 30 mL 1    DEXCOM G7 SENSOR Mary Beth Use 1 sensor every 10 days to track blood glucose, ICD10: E11.65, okay with 90 day supply if possible 9 each 3    empagliflozin (JARDIANCE) 25 mg tablet Take 1 tablet (25 mg total) by mouth once daily. 90 tablet 3    insulin aspart, niacinamide, (FIASP FLEXTOUCH U-100 INSULIN) 100 unit/mL (3 mL) InPn Inject 25 Units into the skin 3 (three) times daily with meals. Give 90 day supply 5  "each 7    MOUNJARO 5 mg/0.5 mL PnIj Inject 5 mg into the skin every 7 days. 4 Pen 6    TRESIBA FLEXTOUCH U-200 200 unit/mL (3 mL) insulin pen Inject 40 Units into the skin every evening. Give 90 day supply 5 Pen 6    blood sugar diagnostic Strp To check BG 4 times daily, to use with insurance preferred meter 400 each 3    blood-glucose meter kit To check BG 4 times daily, to use with insurance preferred meter 1 each 0    lancets Misc To check BG 4 times daily, to use with insurance preferred meter 400 each 3    levocetirizine (XYZAL) 5 MG tablet Take 1 tablet (5 mg total) by mouth every evening. (Patient not taking: Reported on 5/9/2025) 90 tablet 3    lisinopriL (PRINIVIL,ZESTRIL) 2.5 MG tablet TAKE 1 TABLET BY MOUTH EVERY DAY (Patient not taking: Reported on 5/9/2025) 90 tablet 1    ondansetron (ZOFRAN-ODT) 8 MG TbDL Take 1 tablet (8 mg total) by mouth every 6 (six) hours as needed (vomiting). (Patient not taking: Reported on 5/9/2025) 30 tablet 0    pen needle, diabetic (BD ULTRA-FINE MATA PEN NEEDLE) 32 gauge x 5/32" Ndle Use with insulin pen injection 4 times a day, ICD10. 11.65 150 each 5    pravastatin (PRAVACHOL) 10 MG tablet Take 1 tablet (10 mg total) by mouth once daily. 90 tablet 3    promethazine-dextromethorphan (PROMETHAZINE-DM) 6.25-15 mg/5 mL Syrp Take 5 mLs by mouth every 6 (six) hours as needed (cough). (Patient not taking: Reported on 5/9/2025) 180 mL 0     No current facility-administered medications for this visit.     "

## 2025-05-19 ENCOUNTER — PATIENT MESSAGE (OUTPATIENT)
Dept: ADMINISTRATIVE | Facility: HOSPITAL | Age: 45
End: 2025-05-19
Payer: COMMERCIAL

## 2025-05-21 DIAGNOSIS — E11.9 TYPE 2 DIABETES MELLITUS WITHOUT COMPLICATION, UNSPECIFIED WHETHER LONG TERM INSULIN USE: ICD-10-CM

## 2025-07-31 ENCOUNTER — PATIENT MESSAGE (OUTPATIENT)
Dept: ENDOCRINOLOGY | Facility: CLINIC | Age: 45
End: 2025-07-31
Payer: COMMERCIAL

## 2025-07-31 DIAGNOSIS — Z79.4 TYPE 2 DIABETES MELLITUS WITH HYPERGLYCEMIA, WITH LONG-TERM CURRENT USE OF INSULIN: Primary | ICD-10-CM

## 2025-07-31 DIAGNOSIS — E11.65 TYPE 2 DIABETES MELLITUS WITH HYPERGLYCEMIA, WITH LONG-TERM CURRENT USE OF INSULIN: Primary | ICD-10-CM

## 2025-07-31 RX ORDER — TIRZEPATIDE 7.5 MG/.5ML
7.5 INJECTION, SOLUTION SUBCUTANEOUS
Qty: 6 ML | Refills: 1 | Status: SHIPPED | OUTPATIENT
Start: 2025-07-31

## 2025-07-31 RX ORDER — TIRZEPATIDE 7.5 MG/.5ML
7.5 INJECTION, SOLUTION SUBCUTANEOUS
Qty: 4 PEN | Refills: 6 | Status: SHIPPED | OUTPATIENT
Start: 2025-07-31 | End: 2025-07-31 | Stop reason: SDUPTHER

## 2025-08-04 ENCOUNTER — LAB VISIT (OUTPATIENT)
Dept: LAB | Facility: HOSPITAL | Age: 45
End: 2025-08-04
Attending: HOSPITALIST
Payer: COMMERCIAL

## 2025-08-04 DIAGNOSIS — E11.65 TYPE 2 DIABETES MELLITUS WITH HYPERGLYCEMIA, WITH LONG-TERM CURRENT USE OF INSULIN: ICD-10-CM

## 2025-08-04 DIAGNOSIS — Z79.4 TYPE 2 DIABETES MELLITUS WITH HYPERGLYCEMIA, WITH LONG-TERM CURRENT USE OF INSULIN: ICD-10-CM

## 2025-08-04 DIAGNOSIS — E10.65 TYPE 1 DIABETES MELLITUS WITH HYPERGLYCEMIA, WITH LONG-TERM CURRENT USE OF INSULIN: ICD-10-CM

## 2025-08-04 DIAGNOSIS — Z00.00 ANNUAL PHYSICAL EXAM: ICD-10-CM

## 2025-08-04 LAB
ABSOLUTE EOSINOPHIL (OHS): 0.16 K/UL
ABSOLUTE MONOCYTE (OHS): 0.59 K/UL (ref 0.3–1)
ABSOLUTE NEUTROPHIL COUNT (OHS): 3.79 K/UL (ref 1.8–7.7)
ALBUMIN/CREAT UR: 11.1 UG/MG
ANION GAP (OHS): 6 MMOL/L (ref 8–16)
BASOPHILS # BLD AUTO: 0.04 K/UL
BASOPHILS NFR BLD AUTO: 0.7 %
BUN SERPL-MCNC: 19 MG/DL (ref 6–20)
CALCIUM SERPL-MCNC: 8.9 MG/DL (ref 8.7–10.5)
CHLORIDE SERPL-SCNC: 109 MMOL/L (ref 95–110)
CO2 SERPL-SCNC: 25 MMOL/L (ref 23–29)
CREAT SERPL-MCNC: 0.7 MG/DL (ref 0.5–1.4)
CREAT UR-MCNC: 72 MG/DL (ref 23–375)
EAG (OHS): 157 MG/DL (ref 68–131)
ERYTHROCYTE [DISTWIDTH] IN BLOOD BY AUTOMATED COUNT: 13.2 % (ref 11.5–14.5)
GFR SERPLBLD CREATININE-BSD FMLA CKD-EPI: >60 ML/MIN/1.73/M2
GLUCOSE SERPL-MCNC: 127 MG/DL (ref 70–110)
HBA1C MFR BLD: 7.1 % (ref 4–5.6)
HCT VFR BLD AUTO: 45.9 % (ref 40–54)
HGB BLD-MCNC: 15.3 GM/DL (ref 14–18)
IMM GRANULOCYTES # BLD AUTO: 0.03 K/UL (ref 0–0.04)
IMM GRANULOCYTES NFR BLD AUTO: 0.5 % (ref 0–0.5)
LYMPHOCYTES # BLD AUTO: 1.3 K/UL (ref 1–4.8)
MCH RBC QN AUTO: 30.8 PG (ref 27–31)
MCHC RBC AUTO-ENTMCNC: 33.3 G/DL (ref 32–36)
MCV RBC AUTO: 93 FL (ref 82–98)
MICROALBUMIN UR-MCNC: 8 UG/ML (ref ?–5000)
NUCLEATED RBC (/100WBC) (OHS): 0 /100 WBC
PLATELET # BLD AUTO: 200 K/UL (ref 150–450)
PMV BLD AUTO: 11.5 FL (ref 9.2–12.9)
POTASSIUM SERPL-SCNC: 4.5 MMOL/L (ref 3.5–5.1)
RBC # BLD AUTO: 4.96 M/UL (ref 4.6–6.2)
RELATIVE EOSINOPHIL (OHS): 2.7 %
RELATIVE LYMPHOCYTE (OHS): 22 % (ref 18–48)
RELATIVE MONOCYTE (OHS): 10 % (ref 4–15)
RELATIVE NEUTROPHIL (OHS): 64.1 % (ref 38–73)
SODIUM SERPL-SCNC: 140 MMOL/L (ref 136–145)
TSH SERPL-ACNC: 1.42 UIU/ML (ref 0.4–4)
WBC # BLD AUTO: 5.91 K/UL (ref 3.9–12.7)

## 2025-08-04 PROCEDURE — 83036 HEMOGLOBIN GLYCOSYLATED A1C: CPT

## 2025-08-04 PROCEDURE — 36415 COLL VENOUS BLD VENIPUNCTURE: CPT

## 2025-08-04 PROCEDURE — 84443 ASSAY THYROID STIM HORMONE: CPT

## 2025-08-04 PROCEDURE — 82043 UR ALBUMIN QUANTITATIVE: CPT

## 2025-08-04 PROCEDURE — 80048 BASIC METABOLIC PNL TOTAL CA: CPT

## 2025-08-04 PROCEDURE — 85025 COMPLETE CBC W/AUTO DIFF WBC: CPT

## 2025-08-11 ENCOUNTER — OFFICE VISIT (OUTPATIENT)
Dept: ENDOCRINOLOGY | Facility: CLINIC | Age: 45
End: 2025-08-11
Payer: COMMERCIAL

## 2025-08-11 VITALS
HEART RATE: 86 BPM | BODY MASS INDEX: 24.72 KG/M2 | SYSTOLIC BLOOD PRESSURE: 127 MMHG | WEIGHT: 167.38 LBS | DIASTOLIC BLOOD PRESSURE: 80 MMHG

## 2025-08-11 DIAGNOSIS — E11.65 TYPE 2 DIABETES MELLITUS WITH HYPERGLYCEMIA, WITH LONG-TERM CURRENT USE OF INSULIN: ICD-10-CM

## 2025-08-11 DIAGNOSIS — Z79.4 TYPE 2 DIABETES MELLITUS WITH HYPERGLYCEMIA, WITH LONG-TERM CURRENT USE OF INSULIN: ICD-10-CM

## 2025-08-11 DIAGNOSIS — E10.65 TYPE 1 DIABETES MELLITUS WITH HYPERGLYCEMIA, WITH LONG-TERM CURRENT USE OF INSULIN: Primary | ICD-10-CM

## 2025-08-11 DIAGNOSIS — E66.3 OVERWEIGHT (BMI 25.0-29.9): ICD-10-CM

## 2025-08-11 PROCEDURE — 99999 PR PBB SHADOW E&M-EST. PATIENT-LVL IV: CPT | Mod: PBBFAC,,, | Performed by: HOSPITALIST

## 2025-08-11 PROCEDURE — 95251 CONT GLUC MNTR ANALYSIS I&R: CPT | Mod: S$GLB,,, | Performed by: HOSPITALIST

## 2025-08-11 PROCEDURE — 99214 OFFICE O/P EST MOD 30 MIN: CPT | Mod: S$GLB,,, | Performed by: HOSPITALIST

## 2025-08-11 RX ORDER — TIRZEPATIDE 10 MG/.5ML
10 INJECTION, SOLUTION SUBCUTANEOUS
Qty: 6 ML | Refills: 3 | Status: SHIPPED | OUTPATIENT
Start: 2025-08-11

## 2025-08-11 RX ORDER — PEN NEEDLE, DIABETIC 30 GX3/16"
NEEDLE, DISPOSABLE MISCELLANEOUS
Qty: 150 EACH | Refills: 5 | Status: SHIPPED | OUTPATIENT
Start: 2025-08-11

## 2025-08-11 RX ORDER — INSULIN ASPART INJECTION 100 [IU]/ML
25 INJECTION, SOLUTION SUBCUTANEOUS
Qty: 45 ML | Refills: 8 | Status: SHIPPED | OUTPATIENT
Start: 2025-08-11

## 2025-08-11 RX ORDER — INSULIN DEGLUDEC 200 U/ML
40 INJECTION, SOLUTION SUBCUTANEOUS NIGHTLY
Qty: 30 ML | Refills: 8 | Status: SHIPPED | OUTPATIENT
Start: 2025-08-11